# Patient Record
Sex: FEMALE | Race: WHITE | NOT HISPANIC OR LATINO | Employment: UNEMPLOYED | URBAN - NONMETROPOLITAN AREA
[De-identification: names, ages, dates, MRNs, and addresses within clinical notes are randomized per-mention and may not be internally consistent; named-entity substitution may affect disease eponyms.]

---

## 2017-02-24 ENCOUNTER — OFFICE VISIT (OUTPATIENT)
Dept: FAMILY MEDICINE CLINIC | Facility: CLINIC | Age: 52
End: 2017-02-24

## 2017-02-24 VITALS
BODY MASS INDEX: 38.98 KG/M2 | HEIGHT: 62 IN | DIASTOLIC BLOOD PRESSURE: 76 MMHG | HEART RATE: 88 BPM | OXYGEN SATURATION: 93 % | TEMPERATURE: 98.6 F | SYSTOLIC BLOOD PRESSURE: 126 MMHG | WEIGHT: 211.8 LBS

## 2017-02-24 DIAGNOSIS — Z00.00 HEALTHCARE MAINTENANCE: ICD-10-CM

## 2017-02-24 DIAGNOSIS — M72.2 PLANTAR FASCIITIS, BILATERAL: ICD-10-CM

## 2017-02-24 DIAGNOSIS — E53.8 VITAMIN B 12 DEFICIENCY: ICD-10-CM

## 2017-02-24 DIAGNOSIS — E55.9 VITAMIN D DEFICIENCY: ICD-10-CM

## 2017-02-24 DIAGNOSIS — G62.9 NEUROPATHY: ICD-10-CM

## 2017-02-24 DIAGNOSIS — F31.32 BIPOLAR AFFECTIVE DISORDER, CURRENTLY DEPRESSED, MODERATE (HCC): ICD-10-CM

## 2017-02-24 DIAGNOSIS — J44.9 CHRONIC OBSTRUCTIVE PULMONARY DISEASE, UNSPECIFIED COPD TYPE (HCC): Primary | ICD-10-CM

## 2017-02-24 DIAGNOSIS — Z12.11 ENCOUNTER FOR SCREENING COLONOSCOPY: ICD-10-CM

## 2017-02-24 PROCEDURE — 90715 TDAP VACCINE 7 YRS/> IM: CPT | Performed by: NURSE PRACTITIONER

## 2017-02-24 PROCEDURE — 99204 OFFICE O/P NEW MOD 45 MIN: CPT | Performed by: NURSE PRACTITIONER

## 2017-02-24 PROCEDURE — 90471 IMMUNIZATION ADMIN: CPT | Performed by: NURSE PRACTITIONER

## 2017-02-24 RX ORDER — ERGOCALCIFEROL 1.25 MG/1
50000 CAPSULE ORAL WEEKLY
Qty: 20 CAPSULE | Refills: 0 | OUTPATIENT
Start: 2017-02-24 | End: 2017-02-27 | Stop reason: SDUPTHER

## 2017-02-24 NOTE — PROGRESS NOTES
Subjective   Va Pastor is a 51 y.o. female. Patient is here today to establish care. Patient is taking several medications but left the paper at home and will bring that back to us. No listed medications at this time. She does want to discus Neuropathy pain in her feet. Also, she will like to talk about her oxygen. She does use oxygen at home. Her last Mammogram was in 08/2016. Last PAP was in 2015. Her Gynecologist is Dr. Valverde. She has had previous abnormal Mammograms before.     History of Present Illness 63-year-old  female presents to the office today to establish as a new patient. Has been taking several medications at home and will bring the list back to us and she has no listed this time. Patient suffers from 1 yr history of neuropathy pain in her feet with plantar fasciitis and would like to discuss medications. Patient also is on supplemental oxygen at night and uses an oxygen concentrator and does use tanks if needed. Needs to recertify for oxygen. Her last mammogram was August 2016 last Pap was in 2015 her gynecologist is Dr. Valverde. Has a history of previous abnormal mammograms. Chronic back pain from cervical and lumbar surgeries    The following portions of the patient's history were reviewed and updated as appropriate: allergies, current medications, past family history, past medical history, past social history, past surgical history and problem list.    Review of Systems   Respiratory:        COPD.   Asthma.   Musculoskeletal:        Neuropathy pain in feet. Chronic neck and lower back pain from previous spinal surgeries   All other systems reviewed and are negative.      Objective   Physical Exam   Constitutional: She is oriented to person, place, and time. She appears well-developed and well-nourished.   HENT:   Head: Normocephalic and atraumatic.   Eyes: EOM are normal. Pupils are equal, round, and reactive to light.   Neck: Normal range of motion.   Range of motion in neck restricted  from turning to the left side due to cervical surgery.   Cardiovascular: Normal rate, regular rhythm, normal heart sounds and intact distal pulses.    Pulmonary/Chest: Effort normal and breath sounds normal.   Abdominal: Soft. Bowel sounds are normal.   Musculoskeletal:   Lower back restricted range of motion due to lumbar surgery   Neurological: She is alert and oriented to person, place, and time.   One-year history of unexplained bilateral foot neuropathy. Will send to neurology for further workup and treatment plan. Patient also scheduled to go to podiatry for her plantar fasciitis pain   Skin: Skin is warm and dry.   Psychiatric: She has a normal mood and affect. Her behavior is normal. Judgment and thought content normal.   Nursing note and vitals reviewed.      Assessment/Plan   Va was seen today for establish care and feet pain.    Diagnoses and all orders for this visit:    Chronic obstructive pulmonary disease, unspecified COPD type  -     Ambulatory Referral to Pulmonology  -     CBC & Differential; Future  -     Comprehensive Metabolic Panel; Future  -     Compliance Drug Analysis, Ur; Future  -     Lipid Panel; Future  -     TSH; Future  -     Vitamin B12; Future  -     vitamin D (ERGOCALCIFEROL) 20925 UNITS capsule capsule; Take 1 capsule by mouth 1 (One) Time Per Week for 20 doses.    Bipolar affective disorder, currently depressed, moderate  -     Ambulatory Referral to Psychiatry  -     CBC & Differential; Future  -     Comprehensive Metabolic Panel; Future  -     Compliance Drug Analysis, Ur; Future  -     Lipid Panel; Future  -     TSH; Future  -     Vitamin B12; Future  -     vitamin D (ERGOCALCIFEROL) 28219 UNITS capsule capsule; Take 1 capsule by mouth 1 (One) Time Per Week for 20 doses.    Vitamin B 12 deficiency  -     CBC & Differential; Future  -     Comprehensive Metabolic Panel; Future  -     Compliance Drug Analysis, Ur; Future  -     Lipid Panel; Future  -     TSH; Future  -      Vitamin B12; Future  -     vitamin D (ERGOCALCIFEROL) 79970 UNITS capsule capsule; Take 1 capsule by mouth 1 (One) Time Per Week for 20 doses.    Vitamin D deficiency  -     CBC & Differential; Future  -     Comprehensive Metabolic Panel; Future  -     Compliance Drug Analysis, Ur; Future  -     Lipid Panel; Future  -     TSH; Future  -     Vitamin B12; Future  -     vitamin D (ERGOCALCIFEROL) 22498 UNITS capsule capsule; Take 1 capsule by mouth 1 (One) Time Per Week for 20 doses.    Neuropathy  -     Ambulatory Referral to Neurology  -     Ambulatory Referral to Podiatry    Healthcare maintenance  -     Hepatitis C Antibody  -     Tdap Vaccine Greater Than or Equal To 6yo IM    Encounter for screening colonoscopy  -     Ambulatory Referral For Screening Colonoscopy    Plantar fasciitis, bilateral  -     Ambulatory Referral to Podiatry     Will refer patient to podiatry to discuss treatment options for her plantar fasciitis.Healthcare maintenance issues addressed patient scheduled for screening colonoscopy and hepatitis C antibody titer. To get T dap vaccine prior to leaving office today. Scheduling with neurology for pain associated to C-spine and lumbar spine surgeries which may also be affecting the neuropathy in her feet. Labs to be drawn next week when patient fasting.Labs will be called as soon as results are known. Referring to psychiatry to address her bipolarism with depression. Patient is anxious to begin a dialogue with psychiatry. Also referring to pulmonary since patient has COPD and uses supplemental oxygen at night and on PRN basis while at home. In office O2 testing today shows her resting oxygenation at 93% on room air. While walking in the [place] O2 sat noted as 89%, when allowed to rest on room air O2 sat read as 94%. After administration of 2 L of oxygen read as 97%. In office O2 testing completed. Awaiting form from "[employer] to recertify her oxygen.

## 2017-02-27 ENCOUNTER — TELEPHONE (OUTPATIENT)
Dept: FAMILY MEDICINE CLINIC | Facility: CLINIC | Age: 52
End: 2017-02-27

## 2017-02-27 DIAGNOSIS — E53.8 VITAMIN B 12 DEFICIENCY: ICD-10-CM

## 2017-02-27 DIAGNOSIS — J44.9 CHRONIC OBSTRUCTIVE PULMONARY DISEASE, UNSPECIFIED COPD TYPE (HCC): ICD-10-CM

## 2017-02-27 DIAGNOSIS — E55.9 VITAMIN D DEFICIENCY: ICD-10-CM

## 2017-02-27 DIAGNOSIS — Z12.11 ENCOUNTER FOR SCREENING COLONOSCOPY: Primary | ICD-10-CM

## 2017-02-27 DIAGNOSIS — F31.32 BIPOLAR AFFECTIVE DISORDER, CURRENTLY DEPRESSED, MODERATE (HCC): ICD-10-CM

## 2017-02-27 RX ORDER — PAROXETINE HYDROCHLORIDE 40 MG/1
40 TABLET, FILM COATED ORAL EVERY MORNING
COMMUNITY
End: 2018-07-06

## 2017-02-27 RX ORDER — HYDROXYZINE HYDROCHLORIDE 10 MG/1
10 TABLET, FILM COATED ORAL EVERY 4 HOURS PRN
COMMUNITY
End: 2017-04-03

## 2017-02-27 RX ORDER — ARIPIPRAZOLE 5 MG/1
5 TABLET ORAL DAILY
COMMUNITY
End: 2018-10-29 | Stop reason: SDUPTHER

## 2017-02-27 RX ORDER — LISINOPRIL AND HYDROCHLOROTHIAZIDE 25; 20 MG/1; MG/1
1 TABLET ORAL DAILY
COMMUNITY
End: 2018-10-29 | Stop reason: SDUPTHER

## 2017-02-27 RX ORDER — ALBUTEROL SULFATE 4 MG/1
4 TABLET ORAL 2 TIMES DAILY
COMMUNITY
End: 2017-04-24 | Stop reason: ALTCHOICE

## 2017-02-27 RX ORDER — PREGABALIN 150 MG/1
150 CAPSULE ORAL 2 TIMES DAILY
COMMUNITY
End: 2017-05-05 | Stop reason: SDUPTHER

## 2017-02-27 RX ORDER — RANITIDINE 300 MG/1
300 TABLET ORAL 2 TIMES DAILY
COMMUNITY
End: 2018-10-29 | Stop reason: SDUPTHER

## 2017-02-27 RX ORDER — FLUTICASONE PROPIONATE 50 MCG
1 SPRAY, SUSPENSION (ML) NASAL DAILY
COMMUNITY
End: 2018-10-29 | Stop reason: SDUPTHER

## 2017-02-27 RX ORDER — ERGOCALCIFEROL 1.25 MG/1
50000 CAPSULE ORAL WEEKLY
Qty: 20 CAPSULE | Refills: 0 | OUTPATIENT
Start: 2017-02-27 | End: 2017-02-28 | Stop reason: SDUPTHER

## 2017-02-27 RX ORDER — ALBUTEROL SULFATE 90 UG/1
2 AEROSOL, METERED RESPIRATORY (INHALATION) EVERY 4 HOURS PRN
COMMUNITY
End: 2018-10-29 | Stop reason: SDUPTHER

## 2017-02-27 RX ORDER — BENZONATATE 200 MG/1
200 CAPSULE ORAL AS NEEDED
COMMUNITY
End: 2018-07-06

## 2017-02-27 RX ORDER — CETIRIZINE HYDROCHLORIDE 10 MG/1
10 TABLET ORAL DAILY
COMMUNITY
End: 2017-04-03 | Stop reason: SDUPTHER

## 2017-02-27 RX ORDER — POTASSIUM CHLORIDE 750 MG/1
10 TABLET, FILM COATED, EXTENDED RELEASE ORAL 2 TIMES DAILY
COMMUNITY
End: 2018-10-29 | Stop reason: SDUPTHER

## 2017-02-27 RX ORDER — ALENDRONATE SODIUM 70 MG/1
70 TABLET ORAL
COMMUNITY
End: 2018-10-29 | Stop reason: SDUPTHER

## 2017-02-27 RX ORDER — OMEPRAZOLE 40 MG/1
40 CAPSULE, DELAYED RELEASE ORAL DAILY
COMMUNITY
End: 2017-04-03 | Stop reason: SDUPTHER

## 2017-02-27 RX ORDER — MELOXICAM 15 MG/1
15 TABLET ORAL DAILY
COMMUNITY
End: 2018-10-29

## 2017-02-27 RX ORDER — BUSPIRONE HYDROCHLORIDE 15 MG/1
15 TABLET ORAL 2 TIMES DAILY
COMMUNITY
End: 2018-10-29 | Stop reason: SDUPTHER

## 2017-02-27 NOTE — TELEPHONE ENCOUNTER
Please tell patient that she needs to sign a medication agreement we have to wait for a Roland report on her for narcotic drug use and I have to see her drug test. I will keep her Lyrica at the same dose that she has been on until she sees neurology. If this is not agreeable to her at this point I will help her find another provider. She is brand-new to me and I cannot increase narcotic medication without discussing it and knowing the patient.

## 2017-02-27 NOTE — TELEPHONE ENCOUNTER
Patient called today. She said that you would determine what she needs to do for her Neuropathy pain. If she needs to increase her dose or add gabapentin to the treatment plan.

## 2017-02-28 DIAGNOSIS — E55.9 VITAMIN D DEFICIENCY: ICD-10-CM

## 2017-02-28 DIAGNOSIS — E03.9 ACQUIRED HYPOTHYROIDISM: Primary | ICD-10-CM

## 2017-02-28 DIAGNOSIS — F31.32 BIPOLAR AFFECTIVE DISORDER, CURRENTLY DEPRESSED, MODERATE (HCC): ICD-10-CM

## 2017-02-28 DIAGNOSIS — J44.9 CHRONIC OBSTRUCTIVE PULMONARY DISEASE, UNSPECIFIED COPD TYPE (HCC): ICD-10-CM

## 2017-02-28 DIAGNOSIS — E53.8 VITAMIN B 12 DEFICIENCY: ICD-10-CM

## 2017-02-28 LAB
25(OH)D3+25(OH)D2 SERPL-MCNC: 14.9 NG/ML (ref 30–100)
ALBUMIN SERPL-MCNC: 4.5 G/DL (ref 3.5–5.5)
ALBUMIN/GLOB SERPL: 1.9 {RATIO} (ref 1.1–2.5)
ALP SERPL-CCNC: 85 IU/L (ref 39–117)
ALT SERPL-CCNC: 13 IU/L (ref 0–32)
AST SERPL-CCNC: 21 IU/L (ref 0–40)
BASOPHILS # BLD AUTO: 0 X10E3/UL (ref 0–0.2)
BASOPHILS NFR BLD AUTO: 1 %
BILIRUB SERPL-MCNC: <0.2 MG/DL (ref 0–1.2)
BUN SERPL-MCNC: 10 MG/DL (ref 6–24)
BUN/CREAT SERPL: 13 (ref 9–23)
CALCIUM SERPL-MCNC: 9.1 MG/DL (ref 8.7–10.2)
CHLORIDE SERPL-SCNC: 106 MMOL/L (ref 96–106)
CHOLEST SERPL-MCNC: 198 MG/DL (ref 100–199)
CO2 SERPL-SCNC: 23 MMOL/L (ref 18–29)
CREAT SERPL-MCNC: 0.79 MG/DL (ref 0.57–1)
EOSINOPHIL # BLD AUTO: 0.2 X10E3/UL (ref 0–0.4)
EOSINOPHIL NFR BLD AUTO: 3 %
ERYTHROCYTE [DISTWIDTH] IN BLOOD BY AUTOMATED COUNT: 14.9 % (ref 12.3–15.4)
GLOBULIN SER CALC-MCNC: 2.4 G/DL (ref 1.5–4.5)
GLUCOSE SERPL-MCNC: 79 MG/DL (ref 65–99)
HCT VFR BLD AUTO: 40.1 % (ref 34–46.6)
HCV AB S/CO SERPL IA: 0.1 S/CO RATIO (ref 0–0.9)
HDLC SERPL-MCNC: 70 MG/DL
HGB BLD-MCNC: 12.8 G/DL (ref 11.1–15.9)
IMM GRANULOCYTES # BLD: 0 X10E3/UL (ref 0–0.1)
IMM GRANULOCYTES NFR BLD: 0 %
LDLC SERPL CALC-MCNC: 116 MG/DL (ref 0–99)
LYMPHOCYTES # BLD AUTO: 1.3 X10E3/UL (ref 0.7–3.1)
LYMPHOCYTES NFR BLD AUTO: 20 %
MCH RBC QN AUTO: 29.8 PG (ref 26.6–33)
MCHC RBC AUTO-ENTMCNC: 31.9 G/DL (ref 31.5–35.7)
MCV RBC AUTO: 94 FL (ref 79–97)
MONOCYTES # BLD AUTO: 0.8 X10E3/UL (ref 0.1–0.9)
MONOCYTES NFR BLD AUTO: 12 %
MORPHOLOGY BLD-IMP: (no result)
NEUTROPHILS # BLD AUTO: 4.3 X10E3/UL (ref 1.4–7)
NEUTROPHILS NFR BLD AUTO: 64 %
PLATELET # BLD AUTO: 300 X10E3/UL (ref 150–379)
POTASSIUM SERPL-SCNC: 4 MMOL/L (ref 3.5–5.2)
PROT SERPL-MCNC: 6.9 G/DL (ref 6–8.5)
RBC # BLD AUTO: 4.29 X10E6/UL (ref 3.77–5.28)
SODIUM SERPL-SCNC: 145 MMOL/L (ref 134–144)
TRIGL SERPL-MCNC: 59 MG/DL (ref 0–149)
TSH SERPL DL<=0.005 MIU/L-ACNC: 4.57 UIU/ML (ref 0.45–4.5)
VIT B12 SERPL-MCNC: 266 PG/ML (ref 211–946)
VLDLC SERPL CALC-MCNC: 12 MG/DL (ref 5–40)
WBC # BLD AUTO: 6.7 X10E3/UL (ref 3.4–10.8)

## 2017-02-28 RX ORDER — LEVOTHYROXINE SODIUM 0.03 MG/1
25 TABLET ORAL DAILY
Qty: 30 TABLET | Refills: 3 | Status: SHIPPED | OUTPATIENT
Start: 2017-02-28 | End: 2017-04-03 | Stop reason: SDUPTHER

## 2017-02-28 RX ORDER — ERGOCALCIFEROL 1.25 MG/1
50000 CAPSULE ORAL
Qty: 20 CAPSULE | Refills: 0 | Status: SHIPPED | OUTPATIENT
Start: 2017-02-28 | End: 2017-04-03 | Stop reason: SDUPTHER

## 2017-03-02 ENCOUNTER — OFFICE VISIT (OUTPATIENT)
Dept: FAMILY MEDICINE CLINIC | Facility: CLINIC | Age: 52
End: 2017-03-02

## 2017-03-02 VITALS
WEIGHT: 210.6 LBS | SYSTOLIC BLOOD PRESSURE: 132 MMHG | DIASTOLIC BLOOD PRESSURE: 76 MMHG | HEART RATE: 90 BPM | HEIGHT: 63 IN | OXYGEN SATURATION: 93 % | TEMPERATURE: 99.1 F | BODY MASS INDEX: 37.32 KG/M2

## 2017-03-02 DIAGNOSIS — M43.6 TORTICOLLIS: ICD-10-CM

## 2017-03-02 DIAGNOSIS — J44.9 CHRONIC OBSTRUCTIVE PULMONARY DISEASE, UNSPECIFIED COPD TYPE (HCC): Primary | ICD-10-CM

## 2017-03-02 DIAGNOSIS — G62.9 NEUROPATHY: ICD-10-CM

## 2017-03-02 DIAGNOSIS — M72.2 PLANTAR FASCIITIS, BILATERAL: ICD-10-CM

## 2017-03-02 DIAGNOSIS — M19.90 ARTHRITIS: ICD-10-CM

## 2017-03-02 DIAGNOSIS — Z72.0 TOBACCO ABUSE: ICD-10-CM

## 2017-03-02 DIAGNOSIS — J45.909 UNCOMPLICATED ASTHMA, UNSPECIFIED ASTHMA SEVERITY: ICD-10-CM

## 2017-03-02 PROCEDURE — 99214 OFFICE O/P EST MOD 30 MIN: CPT | Performed by: NURSE PRACTITIONER

## 2017-03-02 RX ORDER — NICOTINE 21 MG/24HR
1 PATCH, TRANSDERMAL 24 HOURS TRANSDERMAL EVERY 24 HOURS
Qty: 28 PATCH | Refills: 0 | Status: SHIPPED | OUTPATIENT
Start: 2017-03-02 | End: 2018-07-06

## 2017-03-02 NOTE — PROGRESS NOTES
Subjective   Va Pastor is a 51 y.o. female. Patient wants to discuss a couple medications with you. She would like to know if there issomething else she can take in replacement of the Meloxicam. Patient also would like something else to help with her COPD and Asthma.     History of Present Illness 51-year-old  female presents to the office today to discuss her medications. Patient would like to stop taking meloxicam because of it does not work. She would also like to add something to her asthma and COPD treatment plan. Is already on albuterol 4 mg tablet, albuterol rescue inhaler every tick is known nasal spray, Bero Ellipta and Atrovent nebulizer treatments along with theophylline 400 mg per day. Referred to pulmonary.    The following portions of the patient's history were reviewed and updated as appropriate: allergies, current medications, past family history, past medical history, past social history, past surgical history and problem list.    Review of Systems   Respiratory:        COPD. Smoker  Asthma.   Musculoskeletal:        Arthritis.   All other systems reviewed and are negative.      Objective   Physical Exam   Constitutional: She is oriented to person, place, and time. She appears well-developed and well-nourished.   HENT:   Head: Normocephalic and atraumatic.   Eyes: EOM are normal. Pupils are equal, round, and reactive to light.   Neck: Normal range of motion.   Cardiovascular: Normal rate and regular rhythm.    Pulmonary/Chest: Effort normal. She has wheezes.   Mild Wheezing left lower lobe on inspiration and expiration.   Abdominal: Soft. Bowel sounds are normal.   Musculoskeletal:   arthritis   Neurological: She is alert and oriented to person, place, and time.   Skin: Skin is warm and dry.   Psychiatric: She has a normal mood and affect. Her behavior is normal. Judgment and thought content normal.   Nursing note and vitals reviewed.      Assessment/Plan   Va was seen today for  discuss medication and foot pain.    Diagnoses and all orders for this visit:    Chronic obstructive pulmonary disease, unspecified COPD type  -     Cancel: Ambulatory Referral to Pulmonology  -     ipratropium (ATROVENT) 0.02 % nebulizer solution; Take 2.5 mL by nebulization 4 (Four) Times a Day.    Uncomplicated asthma, unspecified asthma severity  -     Cancel: Ambulatory Referral to Pulmonology    Arthritis  -     diclofenac (VOLTAREN) 50 MG EC tablet; Take 1 tablet by mouth 2 (Two) Times a Day As Needed (arthric pain).  -     Ambulatory Referral to Pain Management    Tobacco abuse  -     nicotine (NICODERM CQ) 21 MG/24HR patch; Place 1 patch on the skin Daily.    Neuropathy  -     Ambulatory Referral to Pain Management    Plantar fasciitis, bilateral  -     Ambulatory Referral to Pain Management    Torticollis  -     Ambulatory Referral to Pain Management     Renewed patient's Atrovent, already has date with pulmonology. They will further developed her care plan. Have ordered Voltaren to help with her arthritic pain. Referring to pain management since her Lyrica is no longer working and she is requesting gabapentin/Neurontin. That becomes a narcotic as of July 2017 will need to seek pain management for further treatment. Continue all medications as ordered, eat a heart healthy diet full of fruits and vegetables, drink six-day glasses of water a day and exercise as able. Patient requesting pneumococcal vaccine 23, but out of stock awaiting backorder.

## 2017-03-03 ENCOUNTER — TELEPHONE (OUTPATIENT)
Dept: FAMILY MEDICINE CLINIC | Facility: CLINIC | Age: 52
End: 2017-03-03

## 2017-03-03 LAB
DRUGS UR: NORMAL
Lab: NORMAL

## 2017-03-03 NOTE — TELEPHONE ENCOUNTER
Patient called thinks she is allergic to the new arthritis that she started yesterday (diclofenac), itching all over today, no rash

## 2017-03-06 NOTE — TELEPHONE ENCOUNTER
Take cool shower and pat dry and stop using the medicine; see if the itch goes away. If the itching stops it might be the medicine. Apply to a very small area of the body see if the itching starts again. If it does we will list this medication as an allergy. The itching you are experiencing moderate be idiopathic, possibly from dry skin. We need to test to make sure.

## 2017-03-06 NOTE — TELEPHONE ENCOUNTER
Patient informed. She said she did stop taking over the weekend and the itching has stopped. She would like to know if you can send in something else for this. She said she missed her appointment because her daughter had a seizure over the weekend and she could not take her but she is needing something.

## 2017-03-06 NOTE — TELEPHONE ENCOUNTER
Have her go to her local pharmacy of choice speak with the pharmacist and ask him what he has in stock to treat neuropathy in her feet. I have heard of a blue star ointment that supposed to help, but I have not used it personally. Pharmacist would have better information

## 2017-03-06 NOTE — TELEPHONE ENCOUNTER
Only nonnarcotic medications I can give her is diclofenac or ibuprofen and she is allergic to both of them.

## 2017-03-07 ENCOUNTER — TELEPHONE (OUTPATIENT)
Dept: FAMILY MEDICINE CLINIC | Facility: CLINIC | Age: 52
End: 2017-03-07

## 2017-03-07 DIAGNOSIS — G62.9 NEUROPATHY: Primary | ICD-10-CM

## 2017-03-07 RX ORDER — CAPSAICIN 0.025 %
CREAM (GRAM) TOPICAL 2 TIMES DAILY
Qty: 56.6 G | Refills: 6 | Status: SHIPPED | OUTPATIENT
Start: 2017-03-07 | End: 2018-07-06

## 2017-03-07 NOTE — TELEPHONE ENCOUNTER
Patient states she called the pharmacy and [employer] will cover  Over-the-counter Capzasin Creme 0.025% wants to know if will send a script to her pharmacy?

## 2017-03-07 NOTE — TELEPHONE ENCOUNTER
Left detailed message that the medication requested was sent to pharmacy. Did not specify on medication or pharmacy.

## 2017-03-30 ENCOUNTER — TELEPHONE (OUTPATIENT)
Dept: FAMILY MEDICINE CLINIC | Facility: CLINIC | Age: 52
End: 2017-03-30

## 2017-03-30 NOTE — TELEPHONE ENCOUNTER
----- Message from Korina Domingo sent at 3/30/2017 11:44 AM EDT -----  PT CALLED SAID SHE IS NOW USING [email or web address]

## 2017-03-31 ENCOUNTER — TELEPHONE (OUTPATIENT)
Dept: FAMILY MEDICINE CLINIC | Facility: CLINIC | Age: 52
End: 2017-03-31

## 2017-04-03 ENCOUNTER — DOCUMENTATION (OUTPATIENT)
Dept: FAMILY MEDICINE CLINIC | Facility: CLINIC | Age: 52
End: 2017-04-03

## 2017-04-03 ENCOUNTER — TELEPHONE (OUTPATIENT)
Dept: FAMILY MEDICINE CLINIC | Facility: CLINIC | Age: 52
End: 2017-04-03

## 2017-04-03 DIAGNOSIS — F31.32 BIPOLAR AFFECTIVE DISORDER, CURRENTLY DEPRESSED, MODERATE (HCC): ICD-10-CM

## 2017-04-03 DIAGNOSIS — J44.9 CHRONIC OBSTRUCTIVE PULMONARY DISEASE, UNSPECIFIED COPD TYPE (HCC): ICD-10-CM

## 2017-04-03 DIAGNOSIS — E55.9 VITAMIN D DEFICIENCY: ICD-10-CM

## 2017-04-03 DIAGNOSIS — E53.8 VITAMIN B 12 DEFICIENCY: ICD-10-CM

## 2017-04-03 DIAGNOSIS — E03.9 ACQUIRED HYPOTHYROIDISM: ICD-10-CM

## 2017-04-03 DIAGNOSIS — J44.9 CHRONIC OBSTRUCTIVE PULMONARY DISEASE, UNSPECIFIED COPD TYPE (HCC): Primary | ICD-10-CM

## 2017-04-03 RX ORDER — LEVOTHYROXINE SODIUM 0.03 MG/1
25 TABLET ORAL DAILY
Qty: 30 TABLET | Refills: 6 | Status: SHIPPED | OUTPATIENT
Start: 2017-04-03 | End: 2017-04-03 | Stop reason: SDUPTHER

## 2017-04-03 RX ORDER — LEVOTHYROXINE SODIUM 0.03 MG/1
25 TABLET ORAL DAILY
Qty: 90 TABLET | Refills: 1 | Status: SHIPPED | OUTPATIENT
Start: 2017-04-03 | End: 2017-04-05 | Stop reason: SDUPTHER

## 2017-04-03 RX ORDER — CETIRIZINE HYDROCHLORIDE 10 MG/1
10 TABLET ORAL DAILY
Qty: 90 TABLET | Refills: 0 | Status: SHIPPED | OUTPATIENT
Start: 2017-04-03 | End: 2018-10-29 | Stop reason: SDUPTHER

## 2017-04-03 RX ORDER — ERGOCALCIFEROL 1.25 MG/1
50000 CAPSULE ORAL
Qty: 20 CAPSULE | Refills: 0 | Status: SHIPPED | OUTPATIENT
Start: 2017-04-03 | End: 2017-04-03 | Stop reason: SDUPTHER

## 2017-04-03 RX ORDER — ERGOCALCIFEROL 1.25 MG/1
50000 CAPSULE ORAL
Qty: 20 CAPSULE | Refills: 0 | Status: SHIPPED | OUTPATIENT
Start: 2017-04-03 | End: 2017-08-14 | Stop reason: SDUPTHER

## 2017-04-03 RX ORDER — OMEPRAZOLE 40 MG/1
40 CAPSULE, DELAYED RELEASE ORAL DAILY
Qty: 90 CAPSULE | Refills: 0 | Status: SHIPPED | OUTPATIENT
Start: 2017-04-03 | End: 2018-10-29

## 2017-04-03 NOTE — TELEPHONE ENCOUNTER
Patient needs an office visit and needs to bring in her containers. I do not see what I increased hydroxyzine or where Breo Elipta was ever ordered. Please ask patient for a office visit and to bring containers for those 2 medications

## 2017-04-03 NOTE — TELEPHONE ENCOUNTER
It looks like her hydroxyzine as been discontinued. All the others can be filled. I would need to see a bottle of the hydroxyzine before I can allow it to be refilled. Thank you

## 2017-04-03 NOTE — TELEPHONE ENCOUNTER
Omeprazole and Cetirizine was sent in. I cannot get the Breo-Ellipta and Hydroxyzine 25 mg to be sent. Can you please try sending this to her pharmacy? She would like to use the [facility]?

## 2017-04-03 NOTE — TELEPHONE ENCOUNTER
Spoke to patient. She states that she is taking the Hydroxyzine 25 mg. Is it okay to send these in for 90 day supply to her mail service pharmacy?

## 2017-04-03 NOTE — TELEPHONE ENCOUNTER
Patient is requesting Levothyroxine 25 mcg, Theophylline 400 mg, and vitamin D3 50, 000 units be sent to her new mail pharmacy. With patient still being new I wanted to verify with you that it would be okay to do?

## 2017-04-03 NOTE — TELEPHONE ENCOUNTER
I just discontinued that one off of her chart because it was the wrong dosing and it would not allow me to put the correct one in without sending it to the pharmacy.

## 2017-04-03 NOTE — PROGRESS NOTES
Per medication reconciliation from outside sources I noted on March 22, 2017, # 120 hydroxyzine 25 mg tablets were dispensed to patient. Today I get a request to refill another 90 day supply through her mail order pharmacy. I refused.

## 2017-04-05 ENCOUNTER — TELEPHONE (OUTPATIENT)
Dept: FAMILY MEDICINE CLINIC | Facility: CLINIC | Age: 52
End: 2017-04-05

## 2017-04-05 DIAGNOSIS — E03.9 ACQUIRED HYPOTHYROIDISM: ICD-10-CM

## 2017-04-05 RX ORDER — LEVOTHYROXINE SODIUM 0.03 MG/1
25 TABLET ORAL DAILY
Qty: 90 TABLET | Refills: 1 | Status: SHIPPED | OUTPATIENT
Start: 2017-04-05 | End: 2018-10-30 | Stop reason: SDUPTHER

## 2017-04-05 NOTE — TELEPHONE ENCOUNTER
Patient called states she needs Spriva sent to [facility] that she no longer has mail order, did not see it on her medication list

## 2017-04-06 NOTE — TELEPHONE ENCOUNTER
Tell patient that I do not have Spiriva listed as a medication on her med list. She will need to make an appointment and bring in all of her medications ( every single thing she is taking) so that I can list them. I cannot order for medications that I have not ordered in the past or that are not on her med list.

## 2017-04-11 ENCOUNTER — TELEPHONE (OUTPATIENT)
Dept: FAMILY MEDICINE CLINIC | Facility: CLINIC | Age: 52
End: 2017-04-11

## 2017-04-11 RX ORDER — GUAIFENESIN 200 MG/1
400 TABLET ORAL EVERY 4 HOURS PRN
Qty: 60 TABLET | Refills: 0 | Status: SHIPPED | OUTPATIENT
Start: 2017-04-11 | End: 2018-07-06

## 2017-04-11 NOTE — TELEPHONE ENCOUNTER
Not without a visit. Sorry. Different meds for congestion of nose, chest, or head.Could try mucinex?See if it is covered.

## 2017-04-11 NOTE — TELEPHONE ENCOUNTER
She can take Coricidin HP for her congestion and it should not affect her blood pressure her heart her anxiety or anything like that

## 2017-04-11 NOTE — TELEPHONE ENCOUNTER
Patient was informed to schedule an appointment to discuss her medications and to bring in all medications. She did want me to ask you if you could give her any medicine for congestion?

## 2017-04-12 ENCOUNTER — RESULTS ENCOUNTER (OUTPATIENT)
Dept: FAMILY MEDICINE CLINIC | Facility: CLINIC | Age: 52
End: 2017-04-12

## 2017-04-12 DIAGNOSIS — E03.9 ACQUIRED HYPOTHYROIDISM: ICD-10-CM

## 2017-04-25 ENCOUNTER — TELEPHONE (OUTPATIENT)
Dept: FAMILY MEDICINE CLINIC | Facility: CLINIC | Age: 52
End: 2017-04-25

## 2017-04-25 NOTE — TELEPHONE ENCOUNTER
Lyrica is a controlled substance, she will need to come in to sign a medication agreement before that medication can be refilled. That is the law.This office has not refilled it before. Ask if a neighbor friend might drive her by the office to sign in agreement ?

## 2017-04-25 NOTE — TELEPHONE ENCOUNTER
----- Message from Korina Domingo sent at 4/25/2017  9:29 AM EDT -----  Contact: [phone]  Pt called and said she is out of her Lyrica 150 mg and needs it to be called in today to [facility] [phone]

## 2017-04-25 NOTE — TELEPHONE ENCOUNTER
Patient informed. She said that she is moving next week and she does not have a car and may not be able to find a way. She said she may end up having to change providers but did not want to schedule that appointment.

## 2017-04-25 NOTE — TELEPHONE ENCOUNTER
Denied patient did not show up for her office visit and did not call to cancel. Patient must be seen before I will refill.

## 2017-04-28 NOTE — TELEPHONE ENCOUNTER
I have tried reaching patient several times. I have printed a letter and put in the folder to be mailed out to her.

## 2017-05-05 RX ORDER — PREGABALIN 150 MG/1
150 CAPSULE ORAL 2 TIMES DAILY
Qty: 60 CAPSULE | Refills: 3 | Status: SHIPPED | OUTPATIENT
Start: 2017-05-05 | End: 2017-05-05 | Stop reason: SDUPTHER

## 2017-05-05 RX ORDER — PREGABALIN 150 MG/1
150 CAPSULE ORAL 2 TIMES DAILY
Qty: 60 CAPSULE | Refills: 3 | OUTPATIENT
Start: 2017-05-05 | End: 2018-10-25 | Stop reason: SDUPTHER

## 2017-08-14 DIAGNOSIS — E55.9 VITAMIN D DEFICIENCY: ICD-10-CM

## 2017-08-14 DIAGNOSIS — F31.32 BIPOLAR AFFECTIVE DISORDER, CURRENTLY DEPRESSED, MODERATE (HCC): ICD-10-CM

## 2017-08-14 DIAGNOSIS — E53.8 VITAMIN B 12 DEFICIENCY: ICD-10-CM

## 2017-08-14 DIAGNOSIS — J44.9 CHRONIC OBSTRUCTIVE PULMONARY DISEASE, UNSPECIFIED COPD TYPE (HCC): ICD-10-CM

## 2017-08-14 RX ORDER — ERGOCALCIFEROL 1.25 MG/1
50000 CAPSULE ORAL
Qty: 4 CAPSULE | Refills: 0 | Status: SHIPPED | OUTPATIENT
Start: 2017-08-14 | End: 2017-12-26

## 2018-07-06 ENCOUNTER — OFFICE VISIT (OUTPATIENT)
Dept: FAMILY MEDICINE CLINIC | Facility: CLINIC | Age: 53
End: 2018-07-06

## 2018-07-06 VITALS
TEMPERATURE: 98.7 F | DIASTOLIC BLOOD PRESSURE: 80 MMHG | BODY MASS INDEX: 28.81 KG/M2 | WEIGHT: 162.6 LBS | OXYGEN SATURATION: 96 % | HEIGHT: 63 IN | HEART RATE: 88 BPM | SYSTOLIC BLOOD PRESSURE: 126 MMHG

## 2018-07-06 DIAGNOSIS — R29.898 WEAKNESS OF BOTH LOWER EXTREMITIES: ICD-10-CM

## 2018-07-06 DIAGNOSIS — M54.32 BILATERAL SCIATICA: ICD-10-CM

## 2018-07-06 DIAGNOSIS — K62.89 DECREASED RECTAL SPHINCTER TONE: ICD-10-CM

## 2018-07-06 DIAGNOSIS — Z98.1 HISTORY OF LUMBAR FUSION: ICD-10-CM

## 2018-07-06 DIAGNOSIS — M51.36 DEGENERATIVE DISC DISEASE, LUMBAR: Primary | ICD-10-CM

## 2018-07-06 DIAGNOSIS — R29.6 MULTIPLE FALLS: ICD-10-CM

## 2018-07-06 DIAGNOSIS — T85.192S MALFUNCTION OF SPINAL CORD STIMULATOR, SEQUELA: ICD-10-CM

## 2018-07-06 DIAGNOSIS — M54.31 BILATERAL SCIATICA: ICD-10-CM

## 2018-07-06 DIAGNOSIS — N39.3 STRESS INCONTINENCE IN FEMALE: ICD-10-CM

## 2018-07-06 DIAGNOSIS — R20.2 PARESTHESIAS: ICD-10-CM

## 2018-07-06 PROCEDURE — 99213 OFFICE O/P EST LOW 20 MIN: CPT | Performed by: PHYSICIAN ASSISTANT

## 2018-07-06 NOTE — PROGRESS NOTES
Subjective   Va Pastor is a 53 y.o. female. Patient seen today complaining of low back going bilateral legs for 3 to 4 weeks     History of Present Illness     PATIENT WITH LOW BACK PAIN X 3-4 WEEKS. STARTS AT LOW BACK- LUMBOSACRAL VIVIAN. RADIATES TO BILATERAL POSTERIOR LEGS TO ANKLE AND FEET. NUMBNESS AND TINGLING AS WEILL. WAS GIVEN FLEXERIL FROM ER BUT MAKES TOO SEDATED, SO DOES NOT LIKE USING. TRIED TYLENOL AND IBUPROFEN. PAIN IS SHARP AND ACHY. BETTER WITH SITTING AND EXTREMELY WORSE WITH STANDING AND MOVEMENTS. PAIN IS CONSTANT 8/10 WITH SITTING AND 15/10 WITH STANDING AND MOVING. HAS FALLEN A COUPLE TIMES AT HOME. MORPHINE FROM THE ER HELPS BUT DOESN'T LAST VERY LONG. PAIN IS THE WORST AT NIGHT AND WHEN GETS UP FROM SITTING. PAIN IMPROVES WITH LEANING FORWARD AND WORSE WITH LEANING BACK. HAS NEVER BEEN TO PAIN MANAGEMENT. STATES HER SURGEON PLACED SPINAL STIMULATOR AND WAS TO ONLY BE THERE 1-2 YEARS AND HAS BEEN 9 YEARS SINCE PLACED. STATES SHE WAS SHAMPOOING CARPET BUT DID NOT FEEL INJURY. HAS NOT REACHED OUT TO NEW NEUROSURGEON OR FINDING NEW NEUROSURGEON. STATES HER INSURANCE WILL NOT PAY TO SEE PREVIOUS SURGEON BECAUSE HE MOVED. NO NEW RASHES, FEVER. COMPLAINS OF FATIGUE AND TAKES A LOT OUT OF HER TO DO THINGS. STATES PAIN IMPACTS ADL. REPORTS SOME NUMBNESS IN GENITALS - WAS HAVING ALL THE TIME BUT NOW IS NOT CONSTANT. WORSE WITH STRAINING- STANDING UP STRAIGHT. HAS SOME URINARY INCONTINENCE WITH COUGHING, SNEEZING AND STANDING UP. NO OTHER BOWEL OR BLADDER ISSUES.     DR BLAIR DID NECK AND BACK SURGERY. REPORTS LUMBAR SPINE SURGERY WAS ABOUT 9-10 YEARS AGO. THEN WAS STILL HAVING PAIN AND PLACED SPINAL STIMULATOR AND GOT BETTER. HAD SOME BACK ACHES OVER THE YEARS BUT THIS IS DIFFERENT. STATES NEW SYMPTOMS STARTED 3-4 WEEKS AGO.   ALSO HAD NECK SURGERY ABOUT 5 YEARS AGO. NECK HAS BEEN OK.     The following portions of the patient's history were reviewed and updated as appropriate: allergies, current  medications, past family history, past medical history, past social history, past surgical history and problem list.    Review of Systems   Musculoskeletal: Positive for back pain.   All other systems reviewed and are negative.      Objective   Physical Exam   Constitutional: She is oriented to person, place, and time. She appears well-developed and well-nourished.   HENT:   Head: Normocephalic and atraumatic.   Right Ear: External ear normal.   Left Ear: External ear normal.   Cardiovascular: Normal rate, regular rhythm, normal heart sounds and intact distal pulses.    Pulmonary/Chest: Effort normal and breath sounds normal. She has no wheezes. She has no rales.   Genitourinary: Rectal exam shows anal tone abnormal (SLIGHTLY DECREASED ANAL TONE).   Musculoskeletal: She exhibits no edema or deformity.        Lumbar back: She exhibits decreased range of motion (TO EXTENSION, LATERAL BEND AND ROTATION) and pain (POSITIVE SLR LEFT. NEGATIVE SLR RIGHT. ). She exhibits no bony tenderness, no swelling, no edema, no spasm and normal pulse.   Neurological: She is alert and oriented to person, place, and time. She has normal strength. Gait (ANTALGIC GAIT AND DIFFICULTY WITH EXTENSION AT LUMBAR SPINE- EVEN TO MIDLINE) abnormal.   Reflex Scores:       Patellar reflexes are 2+ on the right side and 2+ on the left side.       Achilles reflexes are 2+ on the right side and 2+ on the left side.  Psychiatric: She has a normal mood and affect. Her speech is normal and behavior is normal. Judgment and thought content normal. Cognition and memory are normal.   Nursing note and vitals reviewed.      Assessment/Plan   Va was seen today for back pain.    Diagnoses and all orders for this visit:    Degenerative disc disease, lumbar  -     Ambulatory Referral to Neurosurgery    History of lumbar fusion  -     Ambulatory Referral to Neurosurgery    Malfunction of spinal cord stimulator, sequela  -     Ambulatory Referral to  Neurosurgery    Bilateral sciatica  -     Ambulatory Referral to Neurosurgery    Paresthesias  -     Ambulatory Referral to Neurosurgery    Decreased rectal sphincter tone  -     Ambulatory Referral to Neurosurgery    Stress incontinence in female  -     Ambulatory Referral to Neurosurgery    Weakness of both lower extremities  -     Ambulatory Referral to Neurosurgery    Multiple falls  -     Ambulatory Referral to Neurosurgery      Patient Instructions   53 YEAR OLD FEMALE WITH PAST MEDICAL HISTORY SIGNIFICANT FOR DDD/ OA CERVICAL AND LUMBAR SPINE S/P LUMBAR AND CERVICAL SPINE SURGERY AND SPINAL STIMULATOR PLACEMENT. SHE REPORTS SPINAL STIMULATOR WAS PLACED ABOUT 9 YEARS AGO AND WAS ONLY SUPPOSED TO BE IN PLACE FOR 1-2 YEARS PER PATIENT. SHE DID NOT FOLLOW UP BECAUSE SHE CONTINUED TO HAVE PAIN RELIEF. PATIENT REPORTS LOW BACK PAIN WITH BILATERAL SCIATICA THAT STARTED SUDDENLY 3-4 WEEKS AGO WITHOUT INJURY OR PROVOKING ACTIVITY. SHE WAS SEEN AT ER AND HAD XRAYS AND WAS GIVEN MUSCLE RELAXER THAT IS SOMEWHAT SEDATING. SHE HAS NUMBNESS/ TINGLING OF GENITALS AND SLIGHTLY DECREASED ANAL SPHINCTER TONE. SHE NEEDS IMAGING, HOWEVER, I AM UNCERTAIN IF SHE WOULD NEED MRI (OR IF SHE CAN HAVE WITH DEVICE IN PLACE) OR CT MYELOGRAM. I WILL REFER ASAP TO NEUROSURGERY. TO ER ASAP IF WORSENING SYMPTOMS, GI/ SYMPTOMS, OR WORSENING WEAKNESS.

## 2018-07-10 ENCOUNTER — TELEPHONE (OUTPATIENT)
Dept: FAMILY MEDICINE CLINIC | Facility: CLINIC | Age: 53
End: 2018-07-10

## 2018-07-10 NOTE — TELEPHONE ENCOUNTER
Patient called states she is having terrible back pain and she was seen last Friday and she ran-out of Ibuprofen and doesn't have any money to buy any wanted to know if you will send a script to her pharmacy for Ibuprofen that her insurance will cover it

## 2018-07-10 NOTE — PATIENT INSTRUCTIONS
53 YEAR OLD FEMALE WITH PAST MEDICAL HISTORY SIGNIFICANT FOR DDD/ OA CERVICAL AND LUMBAR SPINE S/P LUMBAR AND CERVICAL SPINE SURGERY AND SPINAL STIMULATOR PLACEMENT. SHE REPORTS SPINAL STIMULATOR WAS PLACED ABOUT 9 YEARS AGO AND WAS ONLY SUPPOSED TO BE IN PLACE FOR 1-2 YEARS PER PATIENT. SHE DID NOT FOLLOW UP BECAUSE SHE CONTINUED TO HAVE PAIN RELIEF. PATIENT REPORTS LOW BACK PAIN WITH BILATERAL SCIATICA THAT STARTED SUDDENLY 3-4 WEEKS AGO WITHOUT INJURY OR PROVOKING ACTIVITY. SHE WAS SEEN AT ER AND HAD XRAYS AND WAS GIVEN MUSCLE RELAXER THAT IS SOMEWHAT SEDATING. SHE HAS NUMBNESS/ TINGLING OF GENITALS AND SLIGHTLY DECREASED ANAL SPHINCTER TONE. SHE NEEDS IMAGING, HOWEVER, I AM UNCERTAIN IF SHE WOULD NEED MRI (OR IF SHE CAN HAVE WITH DEVICE IN PLACE) OR CT MYELOGRAM. I WILL REFER ASAP TO NEUROSURGERY. TO ER ASAP IF WORSENING SYMPTOMS, GI/ SYMPTOMS, OR WORSENING WEAKNESS.

## 2018-07-10 NOTE — TELEPHONE ENCOUNTER
PLEASE SEE HOW QUICKLY WE CAN GET HER INTO NEUROSURGERY. I COULD CONSIDER A STEROID TO HELP WITH ACUTE PAIN.

## 2018-07-12 NOTE — TELEPHONE ENCOUNTER
Patient called back and is still in pain.  Melyssa is working on getting her scheduled with neuro but Dr. Dhillon will not see her.  She is wanting to know if she can have some ibuprofen or anything else to help with the pain.

## 2018-07-12 NOTE — TELEPHONE ENCOUNTER
PLEASE SEE HOW QUICKLY WE CAN GET HER IN WITH NEUROSURGERY. SHE HAS BEEN SEEING A DIFFERENT PCP AND IS ON MOBIC AND LYRICA PER MEDICATION LIST. SHE NEEDS IMAGING AND NEUROSURGERY CONSULT ASAP BUT I CANNOT ORDER IMAGING DUE TO COMPLICATED HISTORY. SHE CAN'T SEE ORIGINAL SURGEON DUE TO HIM PRACTICING IN [place] NOW. IF OUR NEUROSURGERY CANNOT SEE HER, PLEASE TRY [facility]. LET ME KNOW AND I WILL MAKE RECOMMENDATIONS.

## 2018-07-23 ENCOUNTER — TELEPHONE (OUTPATIENT)
Dept: FAMILY MEDICINE CLINIC | Facility: CLINIC | Age: 53
End: 2018-07-23

## 2018-07-23 NOTE — TELEPHONE ENCOUNTER
Patient called crying states she needs something for the pain, wants to know if you will send a script for Iburpofen to her pharmacy, Melyssa is still trying to get her into a Neurosurgeon, states she can't come in today due to no transportation

## 2018-07-23 NOTE — TELEPHONE ENCOUNTER
NEEDS TO SEE TAMMIE IN FOLLOW UP. TO ER IF SHE CANNOT GET A RIDE IN HERE. WE HAVE TRIED [facility], DR BLAIR, [facility], AND IS WORKING WITH OTHER GROUPS. I AM NOT SURE WHAT TO DO TO HELP IN THIS SITUATION.

## 2018-08-31 ENCOUNTER — DOCUMENTATION (OUTPATIENT)
Dept: FAMILY MEDICINE CLINIC | Facility: CLINIC | Age: 53
End: 2018-08-31

## 2018-10-11 ENCOUNTER — TELEPHONE (OUTPATIENT)
Dept: FAMILY MEDICINE CLINIC | Facility: CLINIC | Age: 53
End: 2018-10-11

## 2018-10-11 NOTE — TELEPHONE ENCOUNTER
Patient called stating she has been having a persistent cough and thinks she may have bronchitis. She is scheduled for surgery next week with Dr. Marr with [facility] and wants help to relieve this cough before surgery. She stated she is unable to come in for an appointment but was hoping you could send in a cough suppressant. Please advise, thanks.

## 2018-10-11 NOTE — TELEPHONE ENCOUNTER
PATIENT HAS TO BE SEEN. WE CANNOT SEND SOMETHING IN. SHE CAN TRY MUCINEX DM UNTIL SHE CAN BE SEEN.

## 2018-10-25 ENCOUNTER — TELEPHONE (OUTPATIENT)
Dept: FAMILY MEDICINE CLINIC | Facility: CLINIC | Age: 53
End: 2018-10-25

## 2018-10-25 RX ORDER — PREGABALIN 150 MG/1
150 CAPSULE ORAL 2 TIMES DAILY
Qty: 60 CAPSULE | Refills: 0 | Status: SHIPPED | OUTPATIENT
Start: 2018-10-25 | End: 2018-11-20 | Stop reason: DRUGHIGH

## 2018-10-25 NOTE — TELEPHONE ENCOUNTER
Pt is requesting a refill on her Lyrica medication.   Lyrica 150 mg capsules. Sig: take one capsule PO BID, Q: 60.   She just had surgery.   She is scheduling appointment to come see you next week. She wants to know if you could go ahead and fill this just until her appointment to get her through?

## 2018-10-29 ENCOUNTER — TELEPHONE (OUTPATIENT)
Dept: FAMILY MEDICINE CLINIC | Facility: CLINIC | Age: 53
End: 2018-10-29

## 2018-10-29 ENCOUNTER — OFFICE VISIT (OUTPATIENT)
Dept: FAMILY MEDICINE CLINIC | Facility: CLINIC | Age: 53
End: 2018-10-29

## 2018-10-29 VITALS
OXYGEN SATURATION: 95 % | WEIGHT: 174 LBS | BODY MASS INDEX: 30.83 KG/M2 | TEMPERATURE: 98.5 F | HEIGHT: 63 IN | HEART RATE: 81 BPM | RESPIRATION RATE: 16 BRPM | DIASTOLIC BLOOD PRESSURE: 74 MMHG | SYSTOLIC BLOOD PRESSURE: 112 MMHG

## 2018-10-29 DIAGNOSIS — I10 ESSENTIAL HYPERTENSION: ICD-10-CM

## 2018-10-29 DIAGNOSIS — M81.0 AGE-RELATED OSTEOPOROSIS WITHOUT CURRENT PATHOLOGICAL FRACTURE: ICD-10-CM

## 2018-10-29 DIAGNOSIS — F31.32 BIPOLAR AFFECTIVE DISORDER, CURRENTLY DEPRESSED, MODERATE (HCC): ICD-10-CM

## 2018-10-29 DIAGNOSIS — E03.9 ACQUIRED HYPOTHYROIDISM: ICD-10-CM

## 2018-10-29 DIAGNOSIS — J30.89 ENVIRONMENTAL AND SEASONAL ALLERGIES: ICD-10-CM

## 2018-10-29 DIAGNOSIS — K21.9 GASTROESOPHAGEAL REFLUX DISEASE, ESOPHAGITIS PRESENCE NOT SPECIFIED: ICD-10-CM

## 2018-10-29 DIAGNOSIS — Z79.899 CONTROLLED SUBSTANCE AGREEMENT SIGNED: ICD-10-CM

## 2018-10-29 DIAGNOSIS — E55.9 VITAMIN D DEFICIENCY: ICD-10-CM

## 2018-10-29 DIAGNOSIS — J44.9 CHRONIC OBSTRUCTIVE PULMONARY DISEASE, UNSPECIFIED COPD TYPE (HCC): ICD-10-CM

## 2018-10-29 DIAGNOSIS — Z23 ENCOUNTER FOR IMMUNIZATION: Primary | ICD-10-CM

## 2018-10-29 DIAGNOSIS — E53.8 VITAMIN B 12 DEFICIENCY: ICD-10-CM

## 2018-10-29 LAB
25(OH)D3+25(OH)D2 SERPL-MCNC: 16.6 NG/ML (ref 30–100)
ALBUMIN SERPL-MCNC: 4.8 G/DL (ref 3.5–5.2)
ALBUMIN/GLOB SERPL: 1.9 G/DL
ALP SERPL-CCNC: 88 U/L (ref 39–117)
ALT SERPL-CCNC: 10 U/L (ref 1–33)
AST SERPL-CCNC: 15 U/L (ref 1–32)
BASOPHILS # BLD AUTO: 0.06 10*3/MM3 (ref 0–0.2)
BASOPHILS NFR BLD AUTO: 0.5 % (ref 0–1.5)
BILIRUB SERPL-MCNC: 0.3 MG/DL (ref 0.1–1.2)
BUN SERPL-MCNC: 14 MG/DL (ref 6–20)
BUN/CREAT SERPL: 16.5 (ref 7–25)
CALCIUM SERPL-MCNC: 10 MG/DL (ref 8.6–10.5)
CHLORIDE SERPL-SCNC: 101 MMOL/L (ref 98–107)
CHOLEST SERPL-MCNC: 179 MG/DL (ref 0–200)
CO2 SERPL-SCNC: 28.3 MMOL/L (ref 22–29)
CREAT SERPL-MCNC: 0.85 MG/DL (ref 0.57–1)
EOSINOPHIL # BLD AUTO: 0.19 10*3/MM3 (ref 0–0.7)
EOSINOPHIL NFR BLD AUTO: 1.5 % (ref 0.3–6.2)
ERYTHROCYTE [DISTWIDTH] IN BLOOD BY AUTOMATED COUNT: 13.4 % (ref 11.7–13)
GLOBULIN SER CALC-MCNC: 2.5 GM/DL
GLUCOSE SERPL-MCNC: 80 MG/DL (ref 65–99)
HCT VFR BLD AUTO: 41.4 % (ref 35.6–45.5)
HDLC SERPL-MCNC: 50 MG/DL (ref 40–60)
HGB BLD-MCNC: 13.3 G/DL (ref 11.9–15.5)
IMM GRANULOCYTES # BLD: 0.02 10*3/MM3 (ref 0–0.03)
IMM GRANULOCYTES NFR BLD: 0.2 % (ref 0–0.5)
LDLC SERPL CALC-MCNC: 94 MG/DL (ref 0–100)
LYMPHOCYTES # BLD AUTO: 2.06 10*3/MM3 (ref 0.9–4.8)
LYMPHOCYTES NFR BLD AUTO: 16.6 % (ref 19.6–45.3)
MCH RBC QN AUTO: 30.9 PG (ref 26.9–32)
MCHC RBC AUTO-ENTMCNC: 32.1 G/DL (ref 32.4–36.3)
MCV RBC AUTO: 96.1 FL (ref 80.5–98.2)
MONOCYTES # BLD AUTO: 0.65 10*3/MM3 (ref 0.2–1.2)
MONOCYTES NFR BLD AUTO: 5.2 % (ref 5–12)
NEUTROPHILS # BLD AUTO: 9.43 10*3/MM3 (ref 1.9–8.1)
NEUTROPHILS NFR BLD AUTO: 76.2 % (ref 42.7–76)
PLATELET # BLD AUTO: 314 10*3/MM3 (ref 140–500)
POTASSIUM SERPL-SCNC: 4.2 MMOL/L (ref 3.5–5.2)
PROT SERPL-MCNC: 7.3 G/DL (ref 6–8.5)
RBC # BLD AUTO: 4.31 10*6/MM3 (ref 3.9–5.2)
SODIUM SERPL-SCNC: 143 MMOL/L (ref 136–145)
TRIGL SERPL-MCNC: 176 MG/DL (ref 0–150)
TSH SERPL DL<=0.005 MIU/L-ACNC: 4.06 MIU/ML (ref 0.27–4.2)
VIT B12 SERPL-MCNC: 251 PG/ML (ref 211–946)
VLDLC SERPL CALC-MCNC: 35.2 MG/DL (ref 5–40)
WBC # BLD AUTO: 12.39 10*3/MM3 (ref 4.5–10.7)

## 2018-10-29 PROCEDURE — 99214 OFFICE O/P EST MOD 30 MIN: CPT | Performed by: NURSE PRACTITIONER

## 2018-10-29 PROCEDURE — 90674 CCIIV4 VAC NO PRSV 0.5 ML IM: CPT | Performed by: NURSE PRACTITIONER

## 2018-10-29 PROCEDURE — 90471 IMMUNIZATION ADMIN: CPT | Performed by: NURSE PRACTITIONER

## 2018-10-29 RX ORDER — CETIRIZINE HYDROCHLORIDE 10 MG/1
10 TABLET ORAL DAILY
Qty: 90 TABLET | Refills: 3 | Status: SHIPPED | OUTPATIENT
Start: 2018-10-29 | End: 2019-01-25 | Stop reason: SDUPTHER

## 2018-10-29 RX ORDER — ALBUTEROL SULFATE 90 UG/1
2 AEROSOL, METERED RESPIRATORY (INHALATION) EVERY 4 HOURS PRN
Qty: 18 G | Refills: 6 | Status: SHIPPED | OUTPATIENT
Start: 2018-10-29 | End: 2018-11-30 | Stop reason: SDUPTHER

## 2018-10-29 RX ORDER — IPRATROPIUM BROMIDE AND ALBUTEROL SULFATE 2.5; .5 MG/3ML; MG/3ML
3 SOLUTION RESPIRATORY (INHALATION) 4 TIMES DAILY
Qty: 360 ML | Refills: 3 | Status: SHIPPED | OUTPATIENT
Start: 2018-10-29 | End: 2019-01-25 | Stop reason: SDUPTHER

## 2018-10-29 RX ORDER — RANITIDINE 300 MG/1
300 TABLET ORAL NIGHTLY
Qty: 30 TABLET | Refills: 6 | Status: SHIPPED | OUTPATIENT
Start: 2018-10-29 | End: 2019-01-25 | Stop reason: SDUPTHER

## 2018-10-29 RX ORDER — FLUOXETINE HYDROCHLORIDE 40 MG/1
CAPSULE ORAL
COMMUNITY
Start: 2018-09-25 | End: 2018-10-29 | Stop reason: SDUPTHER

## 2018-10-29 RX ORDER — BUSPIRONE HYDROCHLORIDE 15 MG/1
15 TABLET ORAL 2 TIMES DAILY
Qty: 60 TABLET | Refills: 6 | Status: SHIPPED | OUTPATIENT
Start: 2018-10-29 | End: 2019-01-25 | Stop reason: SDUPTHER

## 2018-10-29 RX ORDER — IPRATROPIUM BROMIDE AND ALBUTEROL SULFATE 2.5; .5 MG/3ML; MG/3ML
SOLUTION RESPIRATORY (INHALATION)
COMMUNITY
Start: 2018-10-11 | End: 2018-10-29 | Stop reason: SDUPTHER

## 2018-10-29 RX ORDER — HYDROCODONE BITARTRATE AND ACETAMINOPHEN 5; 325 MG/1; MG/1
TABLET ORAL
Refills: 0 | COMMUNITY
Start: 2018-10-18 | End: 2019-01-25

## 2018-10-29 RX ORDER — LISINOPRIL AND HYDROCHLOROTHIAZIDE 25; 20 MG/1; MG/1
1 TABLET ORAL DAILY
Qty: 30 TABLET | Refills: 6 | Status: SHIPPED | OUTPATIENT
Start: 2018-10-29 | End: 2019-01-25 | Stop reason: SDUPTHER

## 2018-10-29 RX ORDER — HYDROXYZINE HYDROCHLORIDE 25 MG/1
25 TABLET, FILM COATED ORAL DAILY
Qty: 30 TABLET | Refills: 6 | Status: SHIPPED | OUTPATIENT
Start: 2018-10-29 | End: 2019-11-07 | Stop reason: SDUPTHER

## 2018-10-29 RX ORDER — FLUTICASONE PROPIONATE 50 MCG
1 SPRAY, SUSPENSION (ML) NASAL DAILY
Qty: 16 G | Refills: 6 | Status: SHIPPED | OUTPATIENT
Start: 2018-10-29 | End: 2019-01-25 | Stop reason: SDUPTHER

## 2018-10-29 RX ORDER — POTASSIUM CHLORIDE 750 MG/1
10 TABLET, FILM COATED, EXTENDED RELEASE ORAL ONCE
Qty: 30 TABLET | Refills: 6 | Status: SHIPPED | OUTPATIENT
Start: 2018-10-29 | End: 2018-10-29

## 2018-10-29 RX ORDER — ALENDRONATE SODIUM 70 MG/1
70 TABLET ORAL
Qty: 4 TABLET | Refills: 12 | Status: SHIPPED | OUTPATIENT
Start: 2018-10-29 | End: 2019-01-25 | Stop reason: SDUPTHER

## 2018-10-29 RX ORDER — ARIPIPRAZOLE 5 MG/1
5 TABLET ORAL DAILY
Qty: 30 TABLET | Refills: 6 | Status: SHIPPED | OUTPATIENT
Start: 2018-10-29 | End: 2019-01-25 | Stop reason: SDUPTHER

## 2018-10-29 RX ORDER — FLUOXETINE HYDROCHLORIDE 40 MG/1
40 CAPSULE ORAL DAILY
Qty: 30 CAPSULE | Refills: 6 | Status: SHIPPED | OUTPATIENT
Start: 2018-10-29 | End: 2019-01-25 | Stop reason: SDUPTHER

## 2018-10-29 RX ORDER — HYDROXYZINE HYDROCHLORIDE 25 MG/1
TABLET, FILM COATED ORAL
COMMUNITY
Start: 2018-09-25 | End: 2018-10-29 | Stop reason: SDUPTHER

## 2018-10-29 NOTE — TELEPHONE ENCOUNTER
Lyrica was ordered for her on October 25, 2018,   60 tablets sent to her pharmacy. Will not be reordered until approximately November 24, 2018.

## 2018-10-29 NOTE — PROGRESS NOTES
Subjective   Va Pastor is a 53 y.o. female. Patient is being evaluated today for medication management on hypertension, hypothyroidism, depression, COPD, and Bipolar. She is also receiving her influenza vaccination today.     History of Present Illness 53-year-old  female presents to the office today to discuss long-term management of hypertension which is controlled with lisinopril hydrochlorothiazide 20/25 mg tablet per day and potassium 10 MGQ CR tablet per day. Hypothyroidism treated with levothyroxine 25 µg tablet per day. Depression and bipolar treated with M Mary 55 mg tablet produced are 15 mg tablet Prozac 40 mg capsule. Patient also has COPD treated with Ventolin rescue inhaler, fluticasone nasal spray, pre-oh Elipta 200/25 mg per inhalation aerosol powder, Atrovent 0.02% nebulizer solution, duo nebs 0.5/2.5 mg/3 ML nebulizer solution and theophylline 400 mg per 24 hour capsule. Patient has not been seen by me in this office for over a year. These medicines have been prescribed by other providers whom she is seeing recently. She offers no complaints with her medication. I will ask her if she is wishing to reestablish with our office for primary care. Patient also wishes to get an influenza vaccine today.    The following portions of the patient's history were reviewed and updated as appropriate: allergies, current medications, past family history, past medical history, past social history, past surgical history and problem list.    Review of Systems   Respiratory:        COPD.  Asthma.    Cardiovascular:        Hypertension.    Endocrine:        Hypothyroidism.    Psychiatric/Behavioral: Positive for depressed mood.        Bipolar.    All other systems reviewed and are negative.      Objective   Physical Exam   Constitutional: She is oriented to person, place, and time. She appears well-developed and well-nourished.   HENT:   Head: Normocephalic and atraumatic.   Eyes: Pupils are equal,  round, and reactive to light. Conjunctivae and EOM are normal.   Glasses patient wearing glasses   Neck: Normal range of motion. Neck supple.   Cardiovascular: Normal rate and regular rhythm.    Pulmonary/Chest: Effort normal.   Expiratory bilaterally expiratory and inspiratory wheezes noted on the right upper lobe anterior and posteriorly   Abdominal: Soft. Bowel sounds are normal.   Musculoskeletal: Normal range of motion.   Neurological: She is alert and oriented to person, place, and time.   Skin: Skin is warm and dry. Capillary refill takes less than 2 seconds.   Psychiatric: She has a normal mood and affect. Her behavior is normal. Judgment and thought content normal.   Nursing note and vitals reviewed.        Assessment/Plan   Va was seen today for med management and immunizations.    Diagnoses and all orders for this visit:    Encounter for immunization  -     Flucelvax Quad=>4Years ([id number])  -     CBC & Differential  -     Comprehensive Metabolic Panel  -     Lipid Panel  -     TSH  -     Vitamin B12  -     Vitamin D 25 Hydroxy    Acquired hypothyroidism  -     CBC & Differential  -     TSH    Bipolar affective disorder, currently depressed, moderate (CMS/HCC)  -     CBC & Differential  -     Comprehensive Metabolic Panel  -     Lipid Panel  -     TSH  -     Vitamin B12  -     Vitamin D 25 Hydroxy  -     ARIPiprazole (ABILIFY) 5 MG tablet; Take 1 tablet by mouth Daily.  -     busPIRone (BUSPAR) 15 MG tablet; Take 1 tablet by mouth 2 (Two) Times a Day. For anxiety  -     FLUoxetine (PROzac) 40 MG capsule; Take 1 capsule by mouth Daily.  -     hydrOXYzine (ATARAX) 25 MG tablet; Take 1 tablet by mouth Daily.    Chronic obstructive pulmonary disease, unspecified COPD type (CMS/HCC)  -     CBC & Differential  -     Comprehensive Metabolic Panel  -     ipratropium (ATROVENT) 0.02 % nebulizer solution; Take 2.5 mL by nebulization 4 (Four) Times a Day.  -     fluticasone ([employer] FLUTICASONE PROPIONATE) 50  MCG/ACT nasal spray; 1 spray into the nostril(s) as directed by provider Daily.  -     ipratropium-albuterol (DUO-NEB) 0.5-2.5 mg/3 ml nebulizer; Take 3 mL by nebulization 4 (Four) Times a Day.  -     theophylline (AURORA-24) 400 MG 24 hr capsule; Take 1 capsule by mouth Daily.  -     albuterol (VENTOLIN HFA) 108 (90 Base) MCG/ACT inhaler; Inhale 2 puffs Every 4 (Four) Hours As Needed for Wheezing.  -     Ambulatory Referral to Pulmonology    Vitamin B 12 deficiency  -     Vitamin B12    Vitamin D deficiency  -     Vitamin D 25 Hydroxy    Age-related osteoporosis without current pathological fracture  -     alendronate (FOSAMAX) 70 MG tablet; Take 1 tablet by mouth Every 7 (Seven) Days.    Gastroesophageal reflux disease, esophagitis presence not specified  -     raNITIdine (ZANTAC) 300 MG tablet; Take 1 tablet by mouth Every Night.    Environmental and seasonal allergies  -     cetirizine (zyrTEC) 10 MG tablet; Take 1 tablet by mouth Daily. For allergies    Essential hypertension  -     lisinopril-hydrochlorothiazide (PRINZIDE,ZESTORETIC) 20-25 MG per tablet; Take 1 tablet by mouth Daily.  -     potassium chloride (K-DUR) 10 MEQ CR tablet; Take 1 tablet by mouth 1 (One) Time for 1 dose.    Labs drawn today will be called once resulted. Medications renewed as needed. Patient's being referred to pulmonology for her advanced COPD. Patient understands the need to follow-up every 6 months with this office in order to coordinate a plan of care. To notify office immediately for any decline in health status. To eat a heart healthy low-fat high-fiber diet full of fruits and vegetables drinking six-day glasses of water a day and ambulating 30 minutes per day as able. This will help maintain bone density, muscle mass, heart health and has been known to elevate the psyche. Stressed importance of smoking cessation. Patient will let me know when she is ready to quit and we will prescribe medication to help her.

## 2018-10-29 NOTE — TELEPHONE ENCOUNTER
This has been worked out with the pharmacy and the pt.   They was just waiting on the PA response.

## 2018-10-29 NOTE — PROGRESS NOTES
I have reviewed the notes, assessments, and/or procedures performed by Gladys Mckoy, I concur with her/his documentation of Va Pastor.

## 2018-10-30 ENCOUNTER — TELEPHONE (OUTPATIENT)
Dept: FAMILY MEDICINE CLINIC | Facility: CLINIC | Age: 53
End: 2018-10-30

## 2018-10-30 DIAGNOSIS — E03.9 ACQUIRED HYPOTHYROIDISM: ICD-10-CM

## 2018-10-30 DIAGNOSIS — E55.9 VITAMIN D DEFICIENCY: Primary | ICD-10-CM

## 2018-10-30 RX ORDER — ERGOCALCIFEROL 1.25 MG/1
50000 CAPSULE ORAL WEEKLY
Qty: 20 CAPSULE | Refills: 0 | Status: SHIPPED | OUTPATIENT
Start: 2018-10-30 | End: 2019-01-25 | Stop reason: SDUPTHER

## 2018-10-30 RX ORDER — LEVOTHYROXINE SODIUM 0.03 MG/1
25 TABLET ORAL DAILY
Qty: 90 TABLET | Refills: 1 | Status: SHIPPED | OUTPATIENT
Start: 2018-10-30 | End: 2019-01-25 | Stop reason: SDUPTHER

## 2018-10-30 NOTE — TELEPHONE ENCOUNTER
----- Message from AL Baldwin sent at 10/29/2018  4:03 PM EDT -----  Please ask patient to schedule a physical exam she has multiple things on her healthcare maintenance list that need to be addressed

## 2018-11-05 LAB — DRUGS UR: NORMAL

## 2018-11-20 ENCOUNTER — OFFICE VISIT (OUTPATIENT)
Dept: FAMILY MEDICINE CLINIC | Facility: CLINIC | Age: 53
End: 2018-11-20

## 2018-11-20 VITALS
HEIGHT: 63 IN | DIASTOLIC BLOOD PRESSURE: 80 MMHG | OXYGEN SATURATION: 98 % | WEIGHT: 170 LBS | TEMPERATURE: 97.7 F | HEART RATE: 81 BPM | SYSTOLIC BLOOD PRESSURE: 128 MMHG | RESPIRATION RATE: 16 BRPM | BODY MASS INDEX: 30.12 KG/M2

## 2018-11-20 DIAGNOSIS — J06.9 UPPER RESPIRATORY TRACT INFECTION, UNSPECIFIED TYPE: Primary | ICD-10-CM

## 2018-11-20 DIAGNOSIS — R05.9 COUGH: ICD-10-CM

## 2018-11-20 PROCEDURE — 99213 OFFICE O/P EST LOW 20 MIN: CPT | Performed by: NURSE PRACTITIONER

## 2018-11-20 RX ORDER — SENNOSIDES 8.6 MG
650 CAPSULE ORAL EVERY 8 HOURS PRN
Qty: 90 TABLET | Refills: 0 | Status: SHIPPED | OUTPATIENT
Start: 2018-11-20 | End: 2019-10-05 | Stop reason: HOSPADM

## 2018-11-20 RX ORDER — DEXTROMETHORPHAN HYDROBROMIDE AND PROMETHAZINE HYDROCHLORIDE 15; 6.25 MG/5ML; MG/5ML
5 SYRUP ORAL 4 TIMES DAILY PRN
Qty: 240 ML | Refills: 0 | Status: SHIPPED | OUTPATIENT
Start: 2018-11-20 | End: 2019-01-25

## 2018-11-20 RX ORDER — IBUPROFEN 200 MG
200 TABLET ORAL EVERY 6 HOURS PRN
Qty: 90 TABLET | Refills: 0 | Status: SHIPPED | OUTPATIENT
Start: 2018-11-20 | End: 2019-01-25

## 2018-11-20 RX ORDER — PREGABALIN 200 MG/1
200 CAPSULE ORAL 2 TIMES DAILY
Qty: 60 CAPSULE | Refills: 0 | Status: SHIPPED | OUTPATIENT
Start: 2018-11-20 | End: 2018-12-21 | Stop reason: SDUPTHER

## 2018-11-20 RX ORDER — AMOXICILLIN 875 MG/1
875 TABLET, COATED ORAL 2 TIMES DAILY
Qty: 20 TABLET | Refills: 0 | Status: SHIPPED | OUTPATIENT
Start: 2018-11-20 | End: 2018-11-30

## 2018-11-20 NOTE — PROGRESS NOTES
Subjective   Va Pastor is a 53 y.o. female. Patient is being seen today for head and nasal congestion, earache, and sore throat. She would also like to discuss her Lyrica with you.     History of Present Illness 53-year-old  female presents with sudden onset of upper airway congestion, bilateral earache and sore throat. These symptoms have been constant for  1 week. Has treated with albuterol rescue inhaler, Zyrtec 10 mg tablet, Elipta, Atrovent, duo nebs and theophylline. Nothing makes better or worse. On a scale of 1-10 rates her discomfort as 9.5 .    Patient also wants to discuss her Lyrica. Patient is already on Abilify, use far, Prozac, hydroxyzine and Lyrica 150 mg capsule per two times a day. Questing increase to 200 mg capsules twice a day.           The following portions of the patient's history were reviewed and updated as appropriate: allergies, current medications, past family history, past medical history, past social history, past surgical history and problem list.    Review of Systems   HENT: Positive for congestion, ear pain, postnasal drip and sore throat.    Respiratory: Positive for cough.         COPD, upper airway congestion   Endocrine:        Acquired hypothyroidism   Musculoskeletal:        Neuropathy, torticollis   Allergic/Immunologic:        Vitamin B12 and vitamin D deficiency   Psychiatric/Behavioral:        Bipolar with depression   All other systems reviewed and are negative.      Objective   Physical Exam   Constitutional: She is oriented to person, place, and time. She appears well-developed and well-nourished.   HENT:   Head: Normocephalic and atraumatic.   Right tympanic membrane red and swollen ear canal also reddened. Nasal membranes red swollen and dry. Throat and oral oropharynx reddened with whitish markings in tonsillar area, grayish postnasal drip present   Eyes: EOM are normal. Pupils are equal, round, and reactive to light.   Glasses   Neck: Normal range of  motion. Neck supple.   Cardiovascular: Normal rate and regular rhythm.   Pulmonary/Chest: Effort normal and breath sounds normal.   Abdominal: Soft. Bowel sounds are normal.   Musculoskeletal:   Multiple arthralgias myalgias   Neurological: She is alert and oriented to person, place, and time.   Chronic neuropathy pain   Skin: Skin is warm and dry. Capillary refill takes less than 2 seconds.   Psychiatric: She has a normal mood and affect. Her behavior is normal. Judgment and thought content normal.   Nursing note and vitals reviewed.        Assessment/Plan   Va was seen today for nasal congestion, earache and sore throat.    Diagnoses and all orders for this visit:    Upper respiratory tract infection, unspecified type  -     amoxicillin (AMOXIL) 875 MG tablet; Take 1 tablet by mouth 2 (Two) Times a Day for 10 days.    Cough  -     promethazine-dextromethorphan (PROMETHAZINE-DM) 6.25-15 MG/5ML syrup; Take 5 mL by mouth 4 (Four) Times a Day As Needed for Cough.    Other orders  -     pregabalin (LYRICA) 200 MG capsule; Take 1 capsule by mouth 2 (Two) Times a Day.  -     acetaminophen (TYLENOL) 650 MG 8 hr tablet; Take 1 tablet by mouth Every 8 (Eight) Hours As Needed for Mild Pain .    Patient to alternate ibuprofen and Tylenol for body ache and headache. Will treat upper respiratory tract infection with amoxicillin 875 mg one tablet twice a day for 10 days. Will treat cough with promethazine DM 1 teaspoon 4 times a day as needed for cough. Patient aware that it will make her sleepy. Encouraged her to stay indoors out of inclement weather sipping fluids constantly during the waking hours. To allow herself 8-10 hours of sleep per night. Stressing again the importance of hydration. Fluid is more important than eating food at this point. Follow-up in one week if she does not feel that she is improving sooner if worse. Made aware that cough will last 6-8 weeks. Encouraged patient to suck on cough drops or hard  candies such as butter scotch or peppermint to help keep throat moist.

## 2018-11-30 DIAGNOSIS — J44.9 CHRONIC OBSTRUCTIVE PULMONARY DISEASE, UNSPECIFIED COPD TYPE (HCC): ICD-10-CM

## 2018-11-30 RX ORDER — ALBUTEROL SULFATE 90 UG/1
2 AEROSOL, METERED RESPIRATORY (INHALATION) EVERY 4 HOURS PRN
Qty: 18 G | Refills: 1 | Status: SHIPPED | OUTPATIENT
Start: 2018-11-30 | End: 2019-01-25 | Stop reason: SDUPTHER

## 2018-12-21 DIAGNOSIS — L90.5 SCAR CONDITION AND FIBROSIS OF SKIN: Primary | ICD-10-CM

## 2018-12-21 RX ORDER — PREGABALIN 200 MG/1
200 CAPSULE ORAL 2 TIMES DAILY
Qty: 60 CAPSULE | Refills: 0 | Status: SHIPPED | OUTPATIENT
Start: 2018-12-21 | End: 2019-01-25 | Stop reason: SDUPTHER

## 2018-12-21 RX ORDER — PREGABALIN 200 MG/1
200 CAPSULE ORAL 2 TIMES DAILY
Qty: 60 CAPSULE | Refills: 0 | Status: SHIPPED | OUTPATIENT
Start: 2018-12-21 | End: 2018-12-21 | Stop reason: SDUPTHER

## 2018-12-21 NOTE — TELEPHONE ENCOUNTER
Accidentally printed her a prescription so I am voiding that one out and sending the new prescription on Epic

## 2019-01-22 DIAGNOSIS — L90.5 SCAR CONDITION AND FIBROSIS OF SKIN: ICD-10-CM

## 2019-01-22 DIAGNOSIS — J44.9 CHRONIC OBSTRUCTIVE PULMONARY DISEASE, UNSPECIFIED COPD TYPE (HCC): ICD-10-CM

## 2019-01-25 ENCOUNTER — OFFICE VISIT (OUTPATIENT)
Dept: FAMILY MEDICINE CLINIC | Facility: CLINIC | Age: 54
End: 2019-01-25

## 2019-01-25 VITALS
SYSTOLIC BLOOD PRESSURE: 112 MMHG | TEMPERATURE: 98.6 F | OXYGEN SATURATION: 98 % | HEIGHT: 63 IN | WEIGHT: 171.4 LBS | HEART RATE: 88 BPM | RESPIRATION RATE: 16 BRPM | DIASTOLIC BLOOD PRESSURE: 66 MMHG | BODY MASS INDEX: 30.37 KG/M2

## 2019-01-25 DIAGNOSIS — K21.9 GASTROESOPHAGEAL REFLUX DISEASE, ESOPHAGITIS PRESENCE NOT SPECIFIED: ICD-10-CM

## 2019-01-25 DIAGNOSIS — M81.0 AGE-RELATED OSTEOPOROSIS WITHOUT CURRENT PATHOLOGICAL FRACTURE: ICD-10-CM

## 2019-01-25 DIAGNOSIS — E03.9 ACQUIRED HYPOTHYROIDISM: ICD-10-CM

## 2019-01-25 DIAGNOSIS — I10 ESSENTIAL HYPERTENSION: ICD-10-CM

## 2019-01-25 DIAGNOSIS — F32.A ANXIETY AND DEPRESSION: ICD-10-CM

## 2019-01-25 DIAGNOSIS — J30.89 ENVIRONMENTAL AND SEASONAL ALLERGIES: ICD-10-CM

## 2019-01-25 DIAGNOSIS — M79.7 FIBROMYALGIA: Primary | ICD-10-CM

## 2019-01-25 DIAGNOSIS — F41.9 ANXIETY AND DEPRESSION: ICD-10-CM

## 2019-01-25 DIAGNOSIS — L90.5 SCAR CONDITION AND FIBROSIS OF SKIN: ICD-10-CM

## 2019-01-25 DIAGNOSIS — J44.9 CHRONIC OBSTRUCTIVE PULMONARY DISEASE, UNSPECIFIED COPD TYPE (HCC): ICD-10-CM

## 2019-01-25 DIAGNOSIS — E55.9 VITAMIN D DEFICIENCY: ICD-10-CM

## 2019-01-25 DIAGNOSIS — F31.32 BIPOLAR AFFECTIVE DISORDER, CURRENTLY DEPRESSED, MODERATE (HCC): ICD-10-CM

## 2019-01-25 PROBLEM — J06.9 UPPER RESPIRATORY TRACT INFECTION: Status: RESOLVED | Noted: 2018-11-20 | Resolved: 2019-01-25

## 2019-01-25 PROCEDURE — 99214 OFFICE O/P EST MOD 30 MIN: CPT | Performed by: NURSE PRACTITIONER

## 2019-01-25 RX ORDER — ERGOCALCIFEROL 1.25 MG/1
50000 CAPSULE ORAL WEEKLY
Qty: 20 CAPSULE | Refills: 0 | Status: SHIPPED | OUTPATIENT
Start: 2019-01-25 | End: 2019-06-08

## 2019-01-25 RX ORDER — ARIPIPRAZOLE 5 MG/1
5 TABLET ORAL DAILY
Qty: 30 TABLET | Refills: 6 | Status: SHIPPED | OUTPATIENT
Start: 2019-01-25 | End: 2019-06-03

## 2019-01-25 RX ORDER — PREGABALIN 200 MG/1
200 CAPSULE ORAL 2 TIMES DAILY
Qty: 60 CAPSULE | Refills: 0 | Status: SHIPPED | OUTPATIENT
Start: 2019-01-25 | End: 2019-02-25 | Stop reason: SDUPTHER

## 2019-01-25 RX ORDER — POTASSIUM CHLORIDE 750 MG/1
10 TABLET, FILM COATED, EXTENDED RELEASE ORAL 2 TIMES DAILY
Qty: 60 TABLET | Refills: 6 | Status: SHIPPED | OUTPATIENT
Start: 2019-01-25 | End: 2019-09-04 | Stop reason: SDUPTHER

## 2019-01-25 RX ORDER — ALENDRONATE SODIUM 70 MG/1
70 TABLET ORAL
Qty: 4 TABLET | Refills: 12 | Status: SHIPPED | OUTPATIENT
Start: 2019-01-25 | End: 2020-01-23 | Stop reason: SDUPTHER

## 2019-01-25 RX ORDER — FLUTICASONE PROPIONATE 50 MCG
1 SPRAY, SUSPENSION (ML) NASAL DAILY
Qty: 16 G | Refills: 6 | Status: SHIPPED | OUTPATIENT
Start: 2019-01-25 | End: 2019-09-04 | Stop reason: SDUPTHER

## 2019-01-25 RX ORDER — FLUOXETINE HYDROCHLORIDE 40 MG/1
40 CAPSULE ORAL DAILY
Qty: 30 CAPSULE | Refills: 6 | Status: SHIPPED | OUTPATIENT
Start: 2019-01-25 | End: 2019-05-07

## 2019-01-25 RX ORDER — RANITIDINE 300 MG/1
300 TABLET ORAL NIGHTLY
Qty: 30 TABLET | Refills: 6 | Status: SHIPPED | OUTPATIENT
Start: 2019-01-25 | End: 2019-09-04 | Stop reason: SDUPTHER

## 2019-01-25 RX ORDER — LISINOPRIL AND HYDROCHLOROTHIAZIDE 25; 20 MG/1; MG/1
1 TABLET ORAL DAILY
Qty: 30 TABLET | Refills: 6 | Status: SHIPPED | OUTPATIENT
Start: 2019-01-25 | End: 2019-06-03

## 2019-01-25 RX ORDER — BUSPIRONE HYDROCHLORIDE 15 MG/1
15 TABLET ORAL 2 TIMES DAILY
Qty: 60 TABLET | Refills: 6 | Status: SHIPPED | OUTPATIENT
Start: 2019-01-25 | End: 2019-08-22

## 2019-01-25 RX ORDER — ALBUTEROL SULFATE 90 UG/1
2 AEROSOL, METERED RESPIRATORY (INHALATION) EVERY 4 HOURS PRN
Qty: 18 G | Refills: 3 | Status: SHIPPED | OUTPATIENT
Start: 2019-01-25 | End: 2019-04-22 | Stop reason: SDUPTHER

## 2019-01-25 RX ORDER — CETIRIZINE HYDROCHLORIDE 10 MG/1
10 TABLET ORAL DAILY
Qty: 90 TABLET | Refills: 3 | Status: SHIPPED | OUTPATIENT
Start: 2019-01-25 | End: 2020-02-06 | Stop reason: SDUPTHER

## 2019-01-25 RX ORDER — LEVOTHYROXINE SODIUM 0.03 MG/1
25 TABLET ORAL DAILY
Qty: 90 TABLET | Refills: 1 | Status: SHIPPED | OUTPATIENT
Start: 2019-01-25 | End: 2019-07-28 | Stop reason: SDUPTHER

## 2019-01-25 RX ORDER — IPRATROPIUM BROMIDE AND ALBUTEROL SULFATE 2.5; .5 MG/3ML; MG/3ML
3 SOLUTION RESPIRATORY (INHALATION) 4 TIMES DAILY
Qty: 360 ML | Refills: 3 | Status: SHIPPED | OUTPATIENT
Start: 2019-01-25 | End: 2019-04-22 | Stop reason: SDUPTHER

## 2019-01-25 NOTE — PROGRESS NOTES
Subjective   Va Pastor is a 53 y.o. female. Patient is being evaluated today for medication management for COPD, Fibrosis, hypothyroidism, hypertension, GERD, and Vitamin D Deficiency.    History of Present Illness 53-year-old  female presents to the office today to discuss long-term medical management of COPD fibrosis hypothyroidism hypertension GERD and vitamin D deficiency. Labs current until May 2018. Needs medication agreement Roland and compliance collected today for renewal of Lyrica 200 mg twice a day for fibromyalgia and neuropathy.    The following portions of the patient's history were reviewed and updated as appropriate: allergies, current medications, past family history, past medical history, past social history, past surgical history and problem list.    Review of Systems   Respiratory:        COPD.    Cardiovascular:        Hypertension.    Gastrointestinal: Positive for GERD.   Endocrine:        Hypothyroidism.    Musculoskeletal:        Fibrosis.    Hematological:        Vitamin D Deficiency.    All other systems reviewed and are negative.      Objective   Physical Exam   Constitutional: She is oriented to person, place, and time. She appears well-developed and well-nourished.   HENT:   Head: Normocephalic and atraumatic.   Eyes: EOM are normal. Pupils are equal, round, and reactive to light.   Neck: Normal range of motion. Neck supple.   Cardiovascular: Normal rate and regular rhythm.   Pulmonary/Chest: Effort normal.   Slightly diminished  All fields   Abdominal: Soft. Bowel sounds are normal.   Musculoskeletal: Normal range of motion.   Neurological: She is alert and oriented to person, place, and time.   Skin: Skin is warm and dry. Capillary refill takes less than 2 seconds.   Psychiatric: She has a normal mood and affect. Her behavior is normal. Judgment and thought content normal.   Nursing note and vitals reviewed.        Assessment/Plan   Va was seen today for med  management.    Diagnoses and all orders for this visit:    Fibromyalgia  -     Compliance Drug Analysis, Ur - Urine, Clean Catch; Future  -     Compliance Drug Analysis, Ur - Urine, Clean Catch    Chronic obstructive pulmonary disease, unspecified COPD type (CMS/[facility])  -     albuterol sulfate HFA (VENTOLIN HFA) 108 (90 Base) MCG/ACT inhaler; Inhale 2 puffs Every 4 (Four) Hours As Needed for Wheezing.  -     fluticasone ([employer] FLUTICASONE PROPIONATE) 50 MCG/ACT nasal spray; 1 spray into the nostril(s) as directed by provider Daily.  -     Fluticasone Furoate-Vilanterol (BREO ELLIPTA) 200-25 MCG/INH inhaler; Inhale 1 puff Daily.  -     ipratropium (ATROVENT) 0.02 % nebulizer solution; Take 2.5 mL by nebulization 4 (Four) Times a Day.  -     ipratropium-albuterol (DUO-NEB) 0.5-2.5 mg/3 ml nebulizer; Take 3 mL by nebulization 4 (Four) Times a Day.  -     theophylline (AURORA-24) 400 MG 24 hr capsule; Take 1 capsule by mouth Daily.  -     Ambulatory Referral to Pulmonology    Age-related osteoporosis without current pathological fracture  -     alendronate (FOSAMAX) 70 MG tablet; Take 1 tablet by mouth Every 7 (Seven) Days.    Environmental and seasonal allergies  -     cetirizine (zyrTEC) 10 MG tablet; Take 1 tablet by mouth Daily. For allergies    Acquired hypothyroidism  -     levothyroxine (LEVOTHROID) 25 MCG tablet; Take 1 tablet by mouth Daily.    Essential hypertension  -     lisinopril-hydrochlorothiazide (PRINZIDE,ZESTORETIC) 20-25 MG per tablet; Take 1 tablet by mouth Daily.    Gastroesophageal reflux disease, esophagitis presence not specified  -     raNITIdine (ZANTAC) 300 MG tablet; Take 1 tablet by mouth Every Night.    Vitamin D deficiency  -     vitamin D (ERGOCALCIFEROL) 55286 units capsule capsule; Take 1 capsule by mouth 1 (One) Time Per Week for 20 doses.    Scar condition and fibrosis of skin    Anxiety and depression  -     Ambulatory Referral to Psychiatry    Bipolar affective disorder, currently  depressed, moderate (CMS/HCC)  -     ARIPiprazole (ABILIFY) 5 MG tablet; Take 1 tablet by mouth Daily.  -     busPIRone (BUSPAR) 15 MG tablet; Take 1 tablet by mouth 2 (Two) Times a Day. For anxiety  -     FLUoxetine (PROzac) 40 MG capsule; Take 1 capsule by mouth Daily.    Other orders  -     potassium chloride (K-DUR) 10 MEQ CR tablet; Take 1 tablet by mouth 2 (Two) Times a Day.    Labs current. Medications renewed. New medication agreement signed, Gustavo ordered, compliance drug screen collected. Patient will need to make another follow-up to discuss smoking cessation would like to try the patch or the gum. Referred to pulmonology to address her COPD and multiple medications. Referring to psychiatry to address multiple meds used for her anxiety and depression. Patient states they are not helping. To follow-up with this office every 3-6 months and as needed.

## 2019-01-28 ENCOUNTER — TELEPHONE (OUTPATIENT)
Dept: FAMILY MEDICINE CLINIC | Facility: CLINIC | Age: 54
End: 2019-01-28

## 2019-01-28 LAB — SPECIMEN STATUS: NORMAL

## 2019-01-28 NOTE — TELEPHONE ENCOUNTER
[facility] was unable to process drug screen. Pt was informed. She is having surgery this week and will try to come in next week.

## 2019-02-25 DIAGNOSIS — L90.5 SCAR CONDITION AND FIBROSIS OF SKIN: ICD-10-CM

## 2019-03-25 DIAGNOSIS — L90.5 SCAR CONDITION AND FIBROSIS OF SKIN: ICD-10-CM

## 2019-04-22 DIAGNOSIS — J44.9 CHRONIC OBSTRUCTIVE PULMONARY DISEASE, UNSPECIFIED COPD TYPE (HCC): ICD-10-CM

## 2019-04-22 DIAGNOSIS — L90.5 SCAR CONDITION AND FIBROSIS OF SKIN: ICD-10-CM

## 2019-04-22 RX ORDER — ALBUTEROL SULFATE 90 UG/1
AEROSOL, METERED RESPIRATORY (INHALATION)
Qty: 18 G | Refills: 0 | Status: SHIPPED | OUTPATIENT
Start: 2019-04-22 | End: 2019-10-15 | Stop reason: SDUPTHER

## 2019-04-22 RX ORDER — IPRATROPIUM BROMIDE AND ALBUTEROL SULFATE 2.5; .5 MG/3ML; MG/3ML
SOLUTION RESPIRATORY (INHALATION)
Qty: 180 ML | Refills: 0 | Status: SHIPPED | OUTPATIENT
Start: 2019-04-22 | End: 2019-10-05 | Stop reason: HOSPADM

## 2019-04-22 NOTE — TELEPHONE ENCOUNTER
Last medication agreement: 01/25/2019. Pt needs to complete a new one with Dr. Phillip.   Last UDS: 10/29/2019.  Pt does need a new winnie.  Can you complete winnie and send to Dr. Phillip?

## 2019-05-07 ENCOUNTER — TELEPHONE (OUTPATIENT)
Dept: FAMILY MEDICINE CLINIC | Facility: CLINIC | Age: 54
End: 2019-05-07

## 2019-05-07 ENCOUNTER — OFFICE VISIT (OUTPATIENT)
Dept: FAMILY MEDICINE CLINIC | Facility: CLINIC | Age: 54
End: 2019-05-07

## 2019-05-07 VITALS
RESPIRATION RATE: 16 BRPM | HEART RATE: 98 BPM | BODY MASS INDEX: 33.35 KG/M2 | HEIGHT: 63 IN | SYSTOLIC BLOOD PRESSURE: 124 MMHG | DIASTOLIC BLOOD PRESSURE: 84 MMHG | OXYGEN SATURATION: 98 % | WEIGHT: 188.2 LBS | TEMPERATURE: 99.6 F

## 2019-05-07 DIAGNOSIS — G25.81 RLS (RESTLESS LEGS SYNDROME): ICD-10-CM

## 2019-05-07 DIAGNOSIS — F41.9 ANXIETY AND DEPRESSION: Primary | ICD-10-CM

## 2019-05-07 DIAGNOSIS — F32.A ANXIETY AND DEPRESSION: Primary | ICD-10-CM

## 2019-05-07 PROCEDURE — 99214 OFFICE O/P EST MOD 30 MIN: CPT | Performed by: FAMILY MEDICINE

## 2019-05-07 RX ORDER — ESCITALOPRAM OXALATE 20 MG/1
20 TABLET ORAL DAILY
Qty: 30 TABLET | Refills: 5 | Status: SHIPPED | OUTPATIENT
Start: 2019-05-07 | End: 2019-06-03

## 2019-05-07 NOTE — PATIENT INSTRUCTIONS
Please take the Prozac 40 mg every other day for 1 week and then stop.  At the same time start the Lexapro half tablet of the 20 mg for 1 week and then increase to 1 tablet.

## 2019-05-07 NOTE — PROGRESS NOTES
Subjective   Va Pastor is a 54 y.o. female. Presents today for medication management for anxiety/depression, RLS     History of Present Illness   New patient to me    RLS - Stable.  Needs some refills on her Requip.  She does not know the milligram of the medication and then remember that she had initially got up from [employer].  She is going to go home and look at the medication and let me know what the dose was.  She says without the medication she has creepy crawly feelings in her legs and they will go on for hours before she can finally get any rest.  She tolerates the medication very well.    Anxiety/Depression - Uncontrolled.  On Prozac 40mg for a long time but it's not working anymore.  Starting to have increased anxiety, depression, low energy, poor eating, but no suicidal thoughts.  She was on another medication at some point but she cannot remember very well.  She thinks it may have been Zoloft.    The following portions of the patient's history were reviewed and updated as appropriate: allergies, current medications, past family history, past medical history, past social history, past surgical history and problem list.    Review of Systems   Constitutional: Negative for activity change, appetite change, fatigue and fever.   HENT: Negative for ear pain, facial swelling and sore throat.    Eyes: Negative for discharge and itching.   Respiratory: Negative for cough, chest tightness and shortness of breath.    Cardiovascular: Negative for chest pain and palpitations.   Gastrointestinal: Negative for abdominal distention and constipation.   Endocrine: Negative for polydipsia, polyphagia and polyuria.   Genitourinary: Negative for difficulty urinating and flank pain.   Musculoskeletal: Negative for arthralgias and back pain.   Skin: Negative for color change, rash and wound.   Allergic/Immunologic: Negative for environmental allergies and food allergies.   Neurological: Negative for weakness and numbness.    Hematological: Negative for adenopathy. Does not bruise/bleed easily.   Psychiatric/Behavioral: Positive for dysphoric mood. Negative for decreased concentration. The patient is nervous/anxious.        Objective   Physical Exam   Constitutional: She is oriented to person, place, and time. She appears well-developed and well-nourished. No distress.   HENT:   Mouth/Throat: Oropharynx is clear and moist. No oropharyngeal exudate.   Neck: Neck supple.   Cardiovascular: Normal rate, regular rhythm and normal heart sounds. Exam reveals no gallop and no friction rub.   No murmur heard.  Pulmonary/Chest: Effort normal and breath sounds normal. She has no wheezes. She has no rales.   Abdominal: Soft. Bowel sounds are normal. She exhibits no distension. There is no tenderness.   Musculoskeletal: She exhibits no edema or deformity.   Lymphadenopathy:     She has no cervical adenopathy.   Neurological: She is alert and oriented to person, place, and time.   Skin: Skin is warm and dry. No rash noted.   Psychiatric: Her speech is normal and behavior is normal. Judgment and thought content normal. Cognition and memory are normal. She exhibits a depressed mood. She is attentive.   Nursing note and vitals reviewed.      Assessment/Plan   Va was seen today for med management.    Diagnoses and all orders for this visit:    Anxiety and depression  -     escitalopram (LEXAPRO) 20 MG tablet; Take 1 tablet by mouth Daily.    RLS (restless legs syndrome)      Will start on the Lexapro and taper off the Prozac over the next week.  Regarding the restless leg, I am happy to give her the Requip but I need to know the dose.  I asked her to go home and find the bottle and give me a call when she knows the dose and I will send some in for her.

## 2019-05-07 NOTE — TELEPHONE ENCOUNTER
Patient called with her dosage of Ropinirole 0.25 mg- take one or two tablets by mouth at bed time. Okay to refill?

## 2019-05-08 DIAGNOSIS — G25.81 RLS (RESTLESS LEGS SYNDROME): Primary | ICD-10-CM

## 2019-05-08 RX ORDER — ROPINIROLE 0.25 MG/1
TABLET, FILM COATED ORAL
Qty: 60 TABLET | Refills: 3 | Status: SHIPPED | OUTPATIENT
Start: 2019-05-08 | End: 2020-03-21 | Stop reason: SDUPTHER

## 2019-05-22 DIAGNOSIS — J44.9 CHRONIC OBSTRUCTIVE PULMONARY DISEASE, UNSPECIFIED COPD TYPE (HCC): ICD-10-CM

## 2019-06-03 ENCOUNTER — OFFICE VISIT (OUTPATIENT)
Dept: FAMILY MEDICINE CLINIC | Facility: CLINIC | Age: 54
End: 2019-06-03

## 2019-06-03 VITALS
RESPIRATION RATE: 16 BRPM | SYSTOLIC BLOOD PRESSURE: 156 MMHG | DIASTOLIC BLOOD PRESSURE: 90 MMHG | OXYGEN SATURATION: 98 % | BODY MASS INDEX: 33.77 KG/M2 | WEIGHT: 190.6 LBS | HEIGHT: 63 IN | TEMPERATURE: 98.1 F | HEART RATE: 79 BPM

## 2019-06-03 DIAGNOSIS — F32.A ANXIETY AND DEPRESSION: Primary | ICD-10-CM

## 2019-06-03 DIAGNOSIS — F41.9 ANXIETY AND DEPRESSION: Primary | ICD-10-CM

## 2019-06-03 PROCEDURE — 99214 OFFICE O/P EST MOD 30 MIN: CPT | Performed by: FAMILY MEDICINE

## 2019-06-03 RX ORDER — FLUOXETINE 10 MG/1
CAPSULE ORAL
Qty: 14 CAPSULE | Refills: 0 | Status: SHIPPED | OUTPATIENT
Start: 2019-06-03 | End: 2019-07-25

## 2019-06-03 RX ORDER — FLUOXETINE HYDROCHLORIDE 40 MG/1
40 CAPSULE ORAL DAILY
COMMUNITY
End: 2019-06-03 | Stop reason: SDUPTHER

## 2019-06-03 NOTE — PROGRESS NOTES
Subjective   Va Pastor is a 54 y.o. female. Presents today for medication management for anxiety and depression.     History of Present Illness     Anxiety/Depression - uncontrolled.  Appetite changes, thoughts of suicide with no plan or intent.  Possible hx of bipolar per chart but patient denying manic episodes.  She has seen a psychiatrist before.  Interested in sertraline which she is on before.  She is interested in going fishing and camping if she can get her symptoms controlled.  She also endorses crying spells and is crying off and on in the room.    The following portions of the patient's history were reviewed and updated as appropriate: allergies, current medications, past family history, past medical history, past social history, past surgical history and problem list.    Review of Systems   Constitutional: Negative for fatigue and fever.   Respiratory: Negative for shortness of breath and wheezing.    Cardiovascular: Negative for chest pain and palpitations.   Gastrointestinal: Negative for constipation and nausea.   Psychiatric/Behavioral: Positive for decreased concentration, dysphoric mood and sleep disturbance. Negative for agitation, behavioral problems and confusion. The patient is nervous/anxious.        Objective   Physical Exam   Constitutional: She appears well-developed and well-nourished. No distress.   Cardiovascular: Normal rate, regular rhythm and normal heart sounds. Exam reveals no gallop and no friction rub.   No murmur heard.  Pulmonary/Chest: Effort normal and breath sounds normal. She has no wheezes. She has no rales.   Skin: Skin is warm. Capillary refill takes less than 2 seconds. She is not diaphoretic.   Psychiatric: Her speech is normal and behavior is normal. Judgment and thought content normal. Her affect is labile. Cognition and memory are normal. She exhibits a depressed mood. She is attentive.   Nursing note and vitals reviewed.      Assessment/Plan   Va was seen  today for med management.    Diagnoses and all orders for this visit:    Anxiety and depression  -     FLUoxetine (PROzac) 10 MG capsule; Take 3 caps daily for 3 days then 2 caps daily for 3 days and then 1 cap daily for 3 days.  -     sertraline (ZOLOFT) 50 MG tablet; Take 1 tablet daily for 1 week and then increase to 2 tablets daily.      Patient requested switching back to Zoloft which she says had some good benefit in the past.  Spent approximately 30 minutes in the room with greater than 50% of the time counseling face-to-face regarding especially depression, the risks, benefits, alternatives to switching between fluoxetine to sertraline.  I also spent quite a bit of time explaining the cross taper from fluoxetine over to sertraline and the possible side effects of doing such a thing.  Also explained the risks of suicidal thoughts and that she would need to report those immediately to either someone she trusts or call my office to let me know about it.  I also asked that she follow-up in 1 month for us to go over the cross-taper and see how she is doing on the Zoloft as her primary antidepressant.

## 2019-06-03 NOTE — PATIENT INSTRUCTIONS
Major Depressive Disorder, Adult  Major depressive disorder (MDD) is a mental health condition. It may also be called clinical depression or unipolar depression. MDD usually causes feelings of sadness, hopelessness, or helplessness. MDD can also cause physical symptoms. It can interfere with work, school, relationships, and other everyday activities. MDD may be mild, moderate, or severe. It may occur once (single episode major depressive disorder) or it may occur multiple times (recurrent major depressive disorder).  What are the causes?  The exact cause of this condition is not known. MDD is most likely caused by a combination of things, which may include:  · Genetic factors. These are traits that are passed along from parent to child.  · Individual factors. Your personality, your behavior, and the way you handle your thoughts and feelings may contribute to MDD. This includes personality traits and behaviors learned from others.  · Physical factors, such as:  ? Differences in the part of your brain that controls emotion. This part of your brain may be different than it is in people who do not have MDD.  ? Long-term (chronic) medical or psychiatric illnesses.  · Social factors. Traumatic experiences or major life changes may play a role in the development of MDD.    What increases the risk?  This condition is more likely to develop in women. The following factors may also make you more likely to develop MDD:  · A family history of depression.  · Troubled family relationships.  · Abnormally low levels of certain brain chemicals.  · Traumatic events in childhood, especially abuse or the loss of a parent.  · Being under a lot of stress, or long-term stress, especially from upsetting life experiences or losses.  · A history of:  ? Chronic physical illness.  ? Other mental health disorders.  ? Substance abuse.  · Poor living conditions.  · Experiencing social exclusion or discrimination on a regular basis.    What are  the signs or symptoms?  The main symptoms of MDD typically include:  · Constant depressed or irritable mood.  · Loss of interest in things and activities.    MDD symptoms may also include:  · Sleeping or eating too much or too little.  · Unexplained weight change.  · Fatigue or low energy.  · Feelings of worthlessness or guilt.  · Difficulty thinking clearly or making decisions.  · Thoughts of suicide or of harming others.  · Physical agitation or weakness.  · Isolation.    Severe cases of MDD may also occur with other symptoms, such as:  · Delusions or hallucinations, in which you imagine things that are not real (psychotic depression).  · Low-level depression that lasts at least a year (chronic depression or persistent depressive disorder).  · Extreme sadness and hopelessness (melancholic depression).  · Trouble speaking and moving (catatonic depression).    How is this diagnosed?  This condition may be diagnosed based on:  · Your symptoms.  · Your medical history, including your mental health history. This may involve tests to evaluate your mental health. You may be asked questions about your lifestyle, including any drug and alcohol use, and how long you have had symptoms of MDD.  · A physical exam.  · Blood tests to rule out other conditions.    You must have a depressed mood and at least four other MDD symptoms most of the day, nearly every day in the same 2-week timeframe before your health care provider can confirm a diagnosis of MDD.  How is this treated?  This condition is usually treated by mental health professionals, such as psychologists, psychiatrists, and clinical social workers. You may need more than one type of treatment. Treatment may include:  · Psychotherapy. This is also called talk therapy or counseling. Types of psychotherapy include:  ? Cognitive behavioral therapy (CBT). This type of therapy teaches you to recognize unhealthy feelings, thoughts, and behaviors, and replace them with  positive thoughts and actions.  ? Interpersonal therapy (IPT). This helps you to improve the way you relate to and communicate with others.  ? Family therapy. This treatment includes members of your family.  · Medicine to treat anxiety and depression, or to help you control certain emotions and behaviors.  · Lifestyle changes, such as:  ? Limiting alcohol and drug use.  ? Exercising regularly.  ? Getting plenty of sleep.  ? Making healthy eating choices.  ? Spending more time outdoors.    Treatments involving stimulation of the brain can be used in situations with extremely severe symptoms, or when medicine or other therapies do not work over time. These treatments include electroconvulsive therapy, transcranial magnetic stimulation, and vagal nerve stimulation.  Follow these instructions at home:  Activity  · Return to your normal activities as told by your health care provider.  · Exercise regularly and spend time outdoors as told by your health care provider.  General instructions  · Take over-the-counter and prescription medicines only as told by your health care provider.  · Do not drink alcohol. If you drink alcohol, limit your alcohol intake to no more than 1 drink a day for nonpregnant women and 2 drinks a day for men. One drink equals 12 oz of beer, 5 oz of wine, or 1½ oz of hard liquor. Alcohol can affect any antidepressant medicines you are taking. Talk to your health care provider about your alcohol use.  · Eat a healthy diet and get plenty of sleep.  · Find activities that you enjoy doing, and make time to do them.  · Consider joining a support group. Your health care provider may be able to recommend a support group.  · Keep all follow-up visits as told by your health care provider. This is important.  Where to find more information  National [place] on Mental Illness  · www.edgar.org    [place] National [place] of Mental Health  · www.nimh.nih.gov    National Suicide Prevention  [facility]  · 2-540-894-TALK (9352). This is free, 24-hour help.    Contact a health care provider if:  · Your symptoms get worse.  · You develop new symptoms.  Get help right away if:  · You self-harm.  · You have serious thoughts about hurting yourself or others.  · You see, hear, taste, smell, or feel things that are not present (hallucinate).  This information is not intended to replace advice given to you by your health care provider. Make sure you discuss any questions you have with your health care provider.  Document Released: 04/14/2014 Document Revised: 08/24/2017 Document Reviewed: 06/28/2017  [employer] Interactive Patient Education © 2019 [employer]

## 2019-07-22 DIAGNOSIS — L90.5 SCAR CONDITION AND FIBROSIS OF SKIN: ICD-10-CM

## 2019-07-22 NOTE — TELEPHONE ENCOUNTER
Last medication agreement:  01/25/2019  Last winnie: completed today and will be in your folder.   Last UDS: 01/25/2019   Last OV: 06/03/2019   Upcoming OV: 07/25/2019

## 2019-07-25 ENCOUNTER — OFFICE VISIT (OUTPATIENT)
Dept: FAMILY MEDICINE CLINIC | Facility: CLINIC | Age: 54
End: 2019-07-25

## 2019-07-25 VITALS
WEIGHT: 192.6 LBS | OXYGEN SATURATION: 98 % | BODY MASS INDEX: 34.12 KG/M2 | TEMPERATURE: 98.1 F | HEIGHT: 63 IN | DIASTOLIC BLOOD PRESSURE: 64 MMHG | SYSTOLIC BLOOD PRESSURE: 110 MMHG | RESPIRATION RATE: 16 BRPM | HEART RATE: 114 BPM

## 2019-07-25 DIAGNOSIS — M19.90 GENERALIZED ARTHRITIS: ICD-10-CM

## 2019-07-25 DIAGNOSIS — F42.4 DERMATILLOMANIA IN ADULT: ICD-10-CM

## 2019-07-25 DIAGNOSIS — E03.9 HYPOTHYROIDISM, UNSPECIFIED TYPE: ICD-10-CM

## 2019-07-25 DIAGNOSIS — F41.9 ANXIETY AND DEPRESSION: Primary | ICD-10-CM

## 2019-07-25 DIAGNOSIS — F32.A ANXIETY AND DEPRESSION: Primary | ICD-10-CM

## 2019-07-25 PROCEDURE — 99214 OFFICE O/P EST MOD 30 MIN: CPT | Performed by: FAMILY MEDICINE

## 2019-07-25 RX ORDER — MELOXICAM 15 MG/1
15 TABLET ORAL DAILY
Qty: 30 TABLET | Refills: 3 | Status: SHIPPED | OUTPATIENT
Start: 2019-07-25 | End: 2019-12-13 | Stop reason: SDUPTHER

## 2019-07-25 RX ORDER — SERTRALINE HYDROCHLORIDE 100 MG/1
150 TABLET, FILM COATED ORAL DAILY
Qty: 45 TABLET | Refills: 3 | Status: SHIPPED | OUTPATIENT
Start: 2019-07-25 | End: 2019-08-22

## 2019-07-25 NOTE — PROGRESS NOTES
Subjective   Va Pastor is a 54 y.o. female. Presents today for follow up on anxiety/depression and hypothyroidism.  Also curious about skin picking disorder.    History of Present Illness     Anxiety depression-stable.  Patient was being switched from Prozac over to Zoloft.  She says that overall her depression is much better but it could be further improved.  She would like to increase the Zoloft to the next level.  Denies any suicidal or homicidal thoughts.  Concentration is doing better than it was before.    Hypothyroidism-stable.  Patient taking levothyroxine.  She is due for labs today.  She would like a refill on her medication once the labs have gone back.  Denies any symptoms of hypothyroidism such as constipation, mood swings, cold intolerance.    Also has some questions about skin picking.  She says she is been doing that for years but after her son  about 6 years ago is been getting progressively worse.  She says she is got scars up and down her arms and was wondering if there is any thing could be done about that.    Later mentions she wanted the medication for generalized arthritis.  We discussed this briefly in the past.  But never put her on any medication for it.    The following portions of the patient's history were reviewed and updated as appropriate: allergies, current medications, past family history, past medical history, past social history, past surgical history and problem list.    Review of Systems   Constitutional: Negative for activity change, appetite change, fatigue and fever.   HENT: Negative for ear pain, facial swelling and sore throat.    Eyes: Negative for discharge and itching.   Respiratory: Negative for cough, chest tightness and shortness of breath.    Cardiovascular: Negative for chest pain and palpitations.   Gastrointestinal: Negative for abdominal distention and constipation.   Endocrine: Negative for polydipsia, polyphagia and polyuria.   Genitourinary: Negative  for difficulty urinating and flank pain.   Musculoskeletal: Negative for arthralgias and back pain.   Skin: Negative for color change, rash and wound.   Allergic/Immunologic: Negative for environmental allergies and food allergies.   Neurological: Negative for weakness and numbness.   Hematological: Negative for adenopathy. Does not bruise/bleed easily.   Psychiatric/Behavioral: Negative for decreased concentration and dysphoric mood. The patient is nervous/anxious.        Objective   Physical Exam   Constitutional: She is oriented to person, place, and time. She appears well-developed and well-nourished. No distress.   HENT:   Mouth/Throat: Oropharynx is clear and moist. No oropharyngeal exudate.   Eyes: Conjunctivae are normal. Right eye exhibits no discharge. Left eye exhibits no discharge.   Neck: Neck supple.   Cardiovascular: Normal rate, regular rhythm and normal heart sounds. Exam reveals no gallop and no friction rub.   No murmur heard.  Pulmonary/Chest: Effort normal and breath sounds normal. She has no wheezes. She has no rales.   Abdominal: Soft. Bowel sounds are normal. She exhibits no distension. There is no tenderness.   Musculoskeletal: She exhibits no edema or deformity.   Lymphadenopathy:     She has no cervical adenopathy.   Neurological: She is alert and oriented to person, place, and time.   Skin: Skin is warm and dry. No rash noted.   Multiple scars of her bilateral upper extremities   Psychiatric: Her speech is normal and behavior is normal. Judgment and thought content normal. Her mood appears anxious. Cognition and memory are normal. She exhibits a depressed mood. She is attentive.   Nursing note and vitals reviewed.      Assessment/Plan   Va was seen today for follow-up.    Diagnoses and all orders for this visit:    Anxiety and depression  -     sertraline (ZOLOFT) 100 MG tablet; Take 1.5 tablets by mouth Daily.  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    Increase his sertraline to 1-1/2 tablets daily.  Patient has also getting get a TSH today to follow-up on her thyroid and then will refill her medicine accordingly.  For the dermatillomania I provided her a little model with fake skin to pick on and also recommended that she go purchase some gloves such as nitrile gloves to put on when she is feeling like she is trying to pick.  The SSRI should also help with that.  Also prescribed her some meloxicam for the generalized arthritis.  I will talk with her more about that the future.  See her back in about 6 weeks.

## 2019-07-26 LAB — TSH SERPL DL<=0.005 MIU/L-ACNC: 2.3 UIU/ML (ref 0.45–4.5)

## 2019-07-28 ENCOUNTER — TELEPHONE (OUTPATIENT)
Dept: FAMILY MEDICINE CLINIC | Facility: CLINIC | Age: 54
End: 2019-07-28

## 2019-07-28 DIAGNOSIS — E03.9 ACQUIRED HYPOTHYROIDISM: ICD-10-CM

## 2019-07-28 RX ORDER — LEVOTHYROXINE SODIUM 0.03 MG/1
25 TABLET ORAL DAILY
Qty: 90 TABLET | Refills: 3 | Status: ON HOLD | OUTPATIENT
Start: 2019-07-28 | End: 2019-10-03

## 2019-08-12 DIAGNOSIS — J44.9 CHRONIC OBSTRUCTIVE PULMONARY DISEASE, UNSPECIFIED COPD TYPE (HCC): ICD-10-CM

## 2019-08-16 ENCOUNTER — OFFICE VISIT (OUTPATIENT)
Dept: FAMILY MEDICINE CLINIC | Facility: CLINIC | Age: 54
End: 2019-08-16

## 2019-08-16 VITALS
TEMPERATURE: 98.4 F | RESPIRATION RATE: 16 BRPM | HEIGHT: 63 IN | BODY MASS INDEX: 34.84 KG/M2 | SYSTOLIC BLOOD PRESSURE: 140 MMHG | DIASTOLIC BLOOD PRESSURE: 84 MMHG | WEIGHT: 196.6 LBS | HEART RATE: 86 BPM | OXYGEN SATURATION: 96 %

## 2019-08-16 DIAGNOSIS — L08.9 SKIN INFECTION: ICD-10-CM

## 2019-08-16 DIAGNOSIS — T14.8XXA SKIN TRAUMA: ICD-10-CM

## 2019-08-16 DIAGNOSIS — F31.12 BIPOLAR AFFECTIVE DISORDER, CURRENTLY MANIC, MODERATE (HCC): Primary | ICD-10-CM

## 2019-08-16 DIAGNOSIS — F42.4 COMPULSIVE SKIN PICKING: ICD-10-CM

## 2019-08-16 PROCEDURE — 99215 OFFICE O/P EST HI 40 MIN: CPT | Performed by: FAMILY MEDICINE

## 2019-08-16 RX ORDER — CEPHALEXIN 500 MG/1
500 CAPSULE ORAL 3 TIMES DAILY
Qty: 30 CAPSULE | Refills: 0 | Status: SHIPPED | OUTPATIENT
Start: 2019-08-16 | End: 2019-08-22

## 2019-08-16 NOTE — PATIENT INSTRUCTIONS
Liliya  Liliya is a condition that affects people who have certain mood disorders. Liliya involves episodes of emotional highs that include having very high energy, racing thoughts, very high self-esteem, and decreased ability to concentrate. These episodes are very intense and can last longer than a week. In some cases, episodes of liliya can be so strong that people with this condition need to be hospitalized for their safety and the safety of people around them.  What are the causes?  The cause of this condition is not known.  What increases the risk?  You are more likely to develop liliya if you have a mood disorder, especially bipolar disorder.  If you have a mood disorder, the following factors may increase your risk of developing liliya:  · Not getting enough sleep.  · Using substances such as tobacco, caffeine, or illegal drugs.  · Certain prescription medicines, such as antidepressants or antibiotics.  · Stress or emotional events.  · Certain seasons. Liliya is more common in spring and summer.  · The period of time after having a baby (postpartum period).  What are the signs or symptoms?  Symptoms of this condition include:  · Periods of having very high energy that may last longer than a week. In some cases, you have so much energy that you may become unsafe and need to go to the hospital.  · Very high self-esteem or self-confidence.  · Decreased need for sleep.  · Being unusually talkative, or feeling a need to keep talking. Speech may be very fast. It may seem like you cannot stop talking.  · Racing thoughts or constant talking, with quick shifts between topics that may or may not be related (flight of ideas).  · Decreased ability to focus or concentrate.  · Increased purposeful activity, such as work, study, or social activity.  · Increased nonproductive activity. This could be pacing, squirming and fidgeting, or finger and toe tapping.  · Impulsive behavior and poor judgment. This may result in high-risk  activities, such as having unprotected sex or spending a lot of money.  · Having false beliefs (delusions) or seeing, hearing, or feeling things that do not exist (hallucinations).  How is this diagnosed?  This condition may be diagnosed based on:  · Your symptoms and medical history.  · A physical exam. Your health care provider will check for physical conditions that may be causing your symptoms.  · A mental health evaluation. You may be referred to a mental health provider who specializes in diagnosing and treating mood disorders.  How is this treated?  This condition may be treated with:  · Medicines, such as mood stabilizers.  · Talk therapy (psychotherapy) with a mental health provider.  · A procedure to change the brain chemicals that send messages between brain cells (neurotransmitters). This procedure, called electroconvulsive therapy (ECT), applies short electrical pulses to the brain through the scalp. This may be used in cases of severe ben when other treatments have not helped.  Follow these instructions at home:  · Take over-the-counter and prescription medicines only as told by your health care provider.  · Try to go to sleep and wake up at the same time every day.  · Make and follow a routine for daily meal times.  · Ask for support from family, friends, or relatives to make sure you stay on track with your treatment.  · Keep all follow-up visits as told by your health care provider. This is important.  Contact a health care provider if:  · You have concerns about your treatment.  · You have side effects from your prescription medicines.  · Your symptoms do not improve or they get worse.  · Your ben may be putting your health, or others' health, at risk.  Get help right away if:  · You think about hurting yourself or you try to hurt yourself.  · You think about suicide.  If you ever feel like you may hurt yourself or others, or have thoughts about taking your own life, get help right away. You  can go to your nearest emergency department or call:  · Your local emergency services (911 in the [place]  · A suicide crisis helpline, such as the National Suicide Prevention Lifeline at 1-387.888.1414. This is open 24 hours a day.  Summary  · Liliya involves episodes of emotional highs that include having very high energy, racing thoughts, very high self-esteem, and decreased ability to concentrate.  · Episodes of liliya are very intense and can last longer than a week.  · Treatment for liliya may include medicines and talk therapy (psychotherapy).  This information is not intended to replace advice given to you by your health care provider. Make sure you discuss any questions you have with your health care provider.  Document Released: 04/13/2018 Document Revised: 04/13/2018 Document Reviewed: 04/13/2018  [employer] Interactive Patient Education © 2019 [employer].

## 2019-08-16 NOTE — PROGRESS NOTES
Kia Pastor is a 54 y.o. female. Presents today for medication management for bipolar disorder, depression, and anxiety. Patient is having an issue with picking at skin on arms to the point of causing sores. Patients daughter thinks the zoloft is causing her to do this.     History of Present Illness     I have reviewed the history from the MA and is accurate with the patient.  The daughter believes she is having a manic episode and the patient is in agreement.  Been getting progressively worse since taking zoloft.  The patient agrees that she likely is having a manic episode.  She has been taking very extensively at her arms and her legs.  She has open wounds on her left arm from her elbow down to her wrist and healing wounds on her right arm down approximately the same location.  She says in the past they had to put her on a combination of Geodon and Zyprexa in order to get her manic episode under control.  She is interested in possibly going to inpatient facility but she cannot go today but she says she could go tomorrow.  She is interested in going to the [facility] but not necessarily [facility].    The following portions of the patient's history were reviewed and updated as appropriate: allergies, current medications, past family history, past medical history, past social history, past surgical history and problem list.    Review of Systems   Skin: Positive for wound.   Psychiatric/Behavioral: Positive for agitation, behavioral problems, decreased concentration, dysphoric mood, self-injury and sleep disturbance. Negative for confusion, hallucinations and suicidal ideas. The patient is nervous/anxious. The patient is not hyperactive.    All other systems reviewed and are negative.      Objective   Physical Exam   Constitutional: She appears well-developed and well-nourished. No distress.   Cardiovascular: Normal rate, regular rhythm and normal heart sounds. Exam reveals no gallop and no friction  rub.   No murmur heard.  Pulmonary/Chest: Effort normal and breath sounds normal. She has no wheezes. She has no rales.   Skin: Skin is warm. Capillary refill takes less than 2 seconds. She is not diaphoretic.   Multiple open wounds from the elbows down to the wrists bilaterally with the left side appearing more fresh.  No surrounding erythema or signs of infection currently.   Nursing note and vitals reviewed.      Assessment/Plan   Va was seen today for med management.    Diagnoses and all orders for this visit:    Bipolar affective disorder, currently manic, moderate (CMS/HCC)    Skin trauma  -     cephalexin (KEFLEX) 500 MG capsule; Take 1 capsule by mouth 3 (Three) Times a Day.    Skin infection  -     cephalexin (KEFLEX) 500 MG capsule; Take 1 capsule by mouth 3 (Three) Times a Day.    Compulsive skin picking    I spent at least 40 minutes in the room with greater than 50% of the time counseling regarding bipolar disorder especially manic episodes, medication management, and providing therapeutic environment to discuss getting the patient into an inpatient environment.  She says she was willing to do it possibly tomorrow and her daughter was willing to take her at that point.  We also discussed her trauma to her skin with the skin picking.  We provided bandages and bandaged her before she left as well as started her on cephalexin just to be on the safe side that she is been picking with her nails.  She did not exhibit active suicidal ideation nor homicidal ideation nor florid psychosis therefore could not hospitalize her involuntarily.

## 2019-08-22 ENCOUNTER — TELEPHONE (OUTPATIENT)
Dept: FAMILY MEDICINE CLINIC | Facility: CLINIC | Age: 54
End: 2019-08-22

## 2019-08-22 ENCOUNTER — OFFICE VISIT (OUTPATIENT)
Dept: FAMILY MEDICINE CLINIC | Facility: CLINIC | Age: 54
End: 2019-08-22

## 2019-08-22 VITALS
HEART RATE: 92 BPM | OXYGEN SATURATION: 95 % | RESPIRATION RATE: 17 BRPM | SYSTOLIC BLOOD PRESSURE: 148 MMHG | HEIGHT: 63 IN | TEMPERATURE: 98.2 F | DIASTOLIC BLOOD PRESSURE: 90 MMHG | BODY MASS INDEX: 34.66 KG/M2 | WEIGHT: 195.6 LBS

## 2019-08-22 DIAGNOSIS — F31.12 BIPOLAR AFFECTIVE DISORDER, CURRENTLY MANIC, MODERATE (HCC): ICD-10-CM

## 2019-08-22 DIAGNOSIS — F31.12 BIPOLAR AFFECTIVE DISORDER, CURRENTLY MANIC, MODERATE (HCC): Primary | ICD-10-CM

## 2019-08-22 PROCEDURE — 99215 OFFICE O/P EST HI 40 MIN: CPT | Performed by: FAMILY MEDICINE

## 2019-08-22 RX ORDER — OLANZAPINE 7.5 MG/1
7.5 TABLET ORAL NIGHTLY
Qty: 30 TABLET | Refills: 3 | Status: SHIPPED | OUTPATIENT
Start: 2019-08-22 | End: 2019-08-22 | Stop reason: SDUPTHER

## 2019-08-22 RX ORDER — OLANZAPINE 7.5 MG/1
7.5 TABLET ORAL NIGHTLY
Qty: 30 TABLET | Refills: 3 | Status: SHIPPED | OUTPATIENT
Start: 2019-08-22 | End: 2019-10-02 | Stop reason: SDDI

## 2019-08-22 NOTE — TELEPHONE ENCOUNTER
Patient's daughter called and [facility] can't get the Zyprexa 7.5 mg until tomorrow but [facility] [phone] has it, she would like it sent there  Her best call back is [phone]

## 2019-08-22 NOTE — PATIENT INSTRUCTIONS
Bipolar 1 Disorder  Bipolar 1 disorder is a mental health disorder in which a person has episodes of emotional highs (ben), and may also have episodes of emotional lows (depression) in addition to highs. Bipolar 1 disorder is different from other bipolar disorders because it involves extreme manic episodes. These episodes last at least one week or involve symptoms that are so severe that hospitalization is needed to keep the person safe.  What increases the risk?  The cause of this condition is not known. However, certain factors make you more likely to have bipolar disorder, such as:  · Having a family member with the disorder.  · An imbalance of certain chemicals in the brain (neurotransmitters).  · Stress, such as illness, financial problems, or a death.  · Certain conditions that affect the brain or spinal cord (neurologic conditions).  · Brain injury (trauma).  · Having another mental health disorder, such as:  ? Obsessive compulsive disorder.  ? Schizophrenia.  What are the signs or symptoms?  Symptoms of ben include:  · Very high self-esteem or self-confidence.  · Decreased need for sleep.  · Unusual talkativeness or feeling a need to keep talking. Speech may be very fast. It may seem like you cannot stop talking.  · Racing thoughts or constant talking, with quick shifts between topics that may or may not be related (flight of ideas).  · Decreased ability to focus or concentrate.  · Increased purposeful activity, such as work, studies, or social activity.  · Increased nonproductive activity. This could be pacing, squirming and fidgeting, or finger and toe tapping.  · Impulsive behavior and poor judgment. This may result in high-risk activities, such as having unprotected sex or spending a lot of money.  Symptoms of depression include:  · Feeling sad, hopeless, or helpless.  · Frequent or uncontrollable crying.  · Lack of feeling or caring about anything.  · Sleeping too much.  · Moving more slowly than  usual.  · Not being able to enjoy things you used to enjoy.  · Wanting to be alone all the time.  · Feeling guilty or worthless.  · Lack of energy or motivation.  · Trouble concentrating or remembering.  · Trouble making decisions.  · Increased appetite.  · Thoughts of death, or the desire to harm yourself.  Sometimes, you may have a mixed mood. This means having symptoms of depression and ben. Stress can make symptoms worse.  How is this diagnosed?  To diagnose bipolar disorder, your health care provider may ask about your:  · Emotional episodes.  · Medical history.  · Alcohol and drug use. This includes prescription medicines. Certain medical conditions and substances can cause symptoms that seem like bipolar disorder (secondary bipolar disorder).  How is this treated?  Bipolar disorder is a long-term (chronic) illness. It is best controlled with ongoing (continuous) treatment rather than treatment only when symptoms occur. Treatment may include:  · Medicine. Medicine can be prescribed by a provider who specializes in treating mental disorders (psychiatrist).  ? Medicines called mood stabilizers are usually prescribed.  ? If symptoms occur even while taking a mood stabilizer, other medicines may be added.  · Psychotherapy. Some forms of talk therapy, such as cognitive-behavioral therapy (CBT), can provide support, education, and guidance.  · Coping methods, such as journaling or relaxation exercises. These may include:  ? Yoga.  ? Meditation.  ? Deep breathing.  · Lifestyle changes, such as:  ? Limiting alcohol and drug use.  ? Exercising regularly.  ? Getting plenty of sleep.  ? Making healthy eating choices.    A combination of medicine, talk therapy, and coping methods is best. A procedure in which electricity is applied to the brain through the scalp (electroconvulsive therapy) may be used in cases of severe ben when medicine and psychotherapy work too slowly or do not work.  Follow these instructions at  home:  Activity    · Return to your normal activities as told by your health care provider.  · Find activities that you enjoy, and make time to do them.  · Exercise regularly as told by your health care provider.  Lifestyle  · Limit alcohol intake to no more than 1 drink a day for nonpregnant women and 2 drinks a day for men. One drink equals 12 oz of beer, 5 oz of wine, or 1½ oz of hard liquor.  · Follow a set schedule for eating and sleeping.  · Eat a balanced diet that includes fresh fruits and vegetables, whole grains, low-fat dairy, and lean meat.  · Get 7-8 hours of sleep each night.  General instructions  · Take over-the-counter and prescription medicines only as told by your health care provider.  · Think about joining a support group. Your health care provider may be able to recommend a support group.  · Talk with your family and loved ones about your treatment goals and how they can help.  · Keep all follow-up visits as told by your health care provider. This is important.  Where to find more information  For more information about bipolar disorder, visit the following websites:  · National [place] on Mental Illness: www.edgar.org  · U[place] National [place] of Mental Health: www.nimh.nih.gov  Contact a health care provider if:  · Your symptoms get worse.  · You have side effects from your medicine, and they get worse.  · You have trouble sleeping.  · You have trouble doing daily activities.  · You feel unsafe in your surroundings.  · You are dealing with substance abuse.  Get help right away if:  · You have new symptoms.  · You have thoughts about harming yourself.  · You self-harm.  This information is not intended to replace advice given to you by your health care provider. Make sure you discuss any questions you have with your health care provider.  Document Released: 03/26/2002 Document Revised: 08/13/2017 Document Reviewed: 08/17/2017  Elsevier Interactive Patient Education © 2019 Elsevier Inc.
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
## 2019-08-24 ENCOUNTER — HOSPITAL ENCOUNTER (EMERGENCY)
Facility: HOSPITAL | Age: 54
Discharge: PSYCHIATRIC OR SUBSTANCE USE FACILITY | End: 2019-08-26
Attending: EMERGENCY MEDICINE | Admitting: EMERGENCY MEDICINE

## 2019-08-24 DIAGNOSIS — R45.851 SUICIDAL IDEATION: Primary | ICD-10-CM

## 2019-08-24 LAB
ETHANOL BLD-MCNC: <10 MG/DL (ref 0–10)
ETHANOL UR QL: <0.01 %

## 2019-08-24 PROCEDURE — 80307 DRUG TEST PRSMV CHEM ANLYZR: CPT | Performed by: NURSE PRACTITIONER

## 2019-08-24 PROCEDURE — 90791 PSYCH DIAGNOSTIC EVALUATION: CPT

## 2019-08-24 PROCEDURE — 99285 EMERGENCY DEPT VISIT HI MDM: CPT

## 2019-08-24 RX ORDER — ACETAMINOPHEN 500 MG
1000 TABLET ORAL ONCE
Status: COMPLETED | OUTPATIENT
Start: 2019-08-24 | End: 2019-08-24

## 2019-08-24 RX ORDER — ALBUTEROL SULFATE 2.5 MG/3ML
2.5 SOLUTION RESPIRATORY (INHALATION) ONCE
Status: COMPLETED | OUTPATIENT
Start: 2019-08-24 | End: 2019-08-25

## 2019-08-24 RX ORDER — SODIUM CHLORIDE 0.9 % (FLUSH) 0.9 %
10 SYRINGE (ML) INJECTION AS NEEDED
Status: DISCONTINUED | OUTPATIENT
Start: 2019-08-24 | End: 2019-08-26 | Stop reason: HOSPADM

## 2019-08-24 RX ADMIN — ACETAMINOPHEN 1000 MG: 500 TABLET, FILM COATED ORAL at 22:41

## 2019-08-25 LAB
ALBUMIN SERPL-MCNC: 4.3 G/DL (ref 3.5–5.2)
ALBUMIN/GLOB SERPL: 2 G/DL
ALP SERPL-CCNC: 87 U/L (ref 39–117)
ALT SERPL W P-5'-P-CCNC: 7 U/L (ref 1–33)
AMPHET+METHAMPHET UR QL: NEGATIVE
ANION GAP SERPL CALCULATED.3IONS-SCNC: 7.3 MMOL/L (ref 5–15)
AST SERPL-CCNC: 12 U/L (ref 1–32)
BARBITURATES UR QL SCN: NEGATIVE
BASOPHILS # BLD AUTO: 0.07 10*3/MM3 (ref 0–0.2)
BASOPHILS NFR BLD AUTO: 1 % (ref 0–1.5)
BENZODIAZ UR QL SCN: NEGATIVE
BILIRUB SERPL-MCNC: 0.2 MG/DL (ref 0.2–1.2)
BUN BLD-MCNC: 21 MG/DL (ref 6–20)
BUN/CREAT SERPL: 23.6 (ref 7–25)
CALCIUM SPEC-SCNC: 8.6 MG/DL (ref 8.6–10.5)
CANNABINOIDS SERPL QL: NEGATIVE
CHLORIDE SERPL-SCNC: 109 MMOL/L (ref 98–107)
CO2 SERPL-SCNC: 26.7 MMOL/L (ref 22–29)
COCAINE UR QL: NEGATIVE
CREAT BLD-MCNC: 0.89 MG/DL (ref 0.57–1)
DEPRECATED RDW RBC AUTO: 48.5 FL (ref 37–54)
EOSINOPHIL # BLD AUTO: 0.19 10*3/MM3 (ref 0–0.4)
EOSINOPHIL NFR BLD AUTO: 2.8 % (ref 0.3–6.2)
ERYTHROCYTE [DISTWIDTH] IN BLOOD BY AUTOMATED COUNT: 13.3 % (ref 12.3–15.4)
GFR SERPL CREATININE-BSD FRML MDRD: 66 ML/MIN/1.73
GLOBULIN UR ELPH-MCNC: 2.1 GM/DL
GLUCOSE BLD-MCNC: 101 MG/DL (ref 65–99)
HCT VFR BLD AUTO: 42.2 % (ref 34–46.6)
HGB BLD-MCNC: 13.1 G/DL (ref 12–15.9)
IMM GRANULOCYTES # BLD AUTO: 0.02 10*3/MM3 (ref 0–0.05)
IMM GRANULOCYTES NFR BLD AUTO: 0.3 % (ref 0–0.5)
LYMPHOCYTES # BLD AUTO: 1.86 10*3/MM3 (ref 0.7–3.1)
LYMPHOCYTES NFR BLD AUTO: 27.9 % (ref 19.6–45.3)
MCH RBC QN AUTO: 30.1 PG (ref 26.6–33)
MCHC RBC AUTO-ENTMCNC: 31 G/DL (ref 31.5–35.7)
MCV RBC AUTO: 97 FL (ref 79–97)
METHADONE UR QL SCN: NEGATIVE
MONOCYTES # BLD AUTO: 0.57 10*3/MM3 (ref 0.1–0.9)
MONOCYTES NFR BLD AUTO: 8.5 % (ref 5–12)
NEUTROPHILS # BLD AUTO: 3.96 10*3/MM3 (ref 1.7–7)
NEUTROPHILS NFR BLD AUTO: 59.5 % (ref 42.7–76)
NRBC BLD AUTO-RTO: 0 /100 WBC (ref 0–0.2)
OPIATES UR QL: NEGATIVE
OXYCODONE UR QL SCN: NEGATIVE
PLATELET # BLD AUTO: 277 10*3/MM3 (ref 140–450)
PMV BLD AUTO: 11.4 FL (ref 6–12)
POTASSIUM BLD-SCNC: 4.6 MMOL/L (ref 3.5–5.2)
PROT SERPL-MCNC: 6.4 G/DL (ref 6–8.5)
RBC # BLD AUTO: 4.35 10*6/MM3 (ref 3.77–5.28)
SODIUM BLD-SCNC: 143 MMOL/L (ref 136–145)
WBC NRBC COR # BLD: 6.67 10*3/MM3 (ref 3.4–10.8)

## 2019-08-25 PROCEDURE — 80307 DRUG TEST PRSMV CHEM ANLYZR: CPT | Performed by: NURSE PRACTITIONER

## 2019-08-25 PROCEDURE — 94799 UNLISTED PULMONARY SVC/PX: CPT

## 2019-08-25 PROCEDURE — 80053 COMPREHEN METABOLIC PANEL: CPT | Performed by: PHYSICIAN ASSISTANT

## 2019-08-25 PROCEDURE — 85025 COMPLETE CBC W/AUTO DIFF WBC: CPT | Performed by: PHYSICIAN ASSISTANT

## 2019-08-25 PROCEDURE — 94640 AIRWAY INHALATION TREATMENT: CPT

## 2019-08-25 RX ORDER — IPRATROPIUM BROMIDE AND ALBUTEROL SULFATE 2.5; .5 MG/3ML; MG/3ML
3 SOLUTION RESPIRATORY (INHALATION) ONCE
Status: COMPLETED | OUTPATIENT
Start: 2019-08-25 | End: 2019-08-25

## 2019-08-25 RX ORDER — ACETAMINOPHEN 500 MG
1000 TABLET ORAL ONCE
Status: COMPLETED | OUTPATIENT
Start: 2019-08-25 | End: 2019-08-25

## 2019-08-25 RX ORDER — PREGABALIN 100 MG/1
200 CAPSULE ORAL ONCE
Status: COMPLETED | OUTPATIENT
Start: 2019-08-25 | End: 2019-08-25

## 2019-08-25 RX ORDER — NICOTINE 21 MG/24HR
1 PATCH, TRANSDERMAL 24 HOURS TRANSDERMAL
Status: DISCONTINUED | OUTPATIENT
Start: 2019-08-25 | End: 2019-08-26 | Stop reason: HOSPADM

## 2019-08-25 RX ADMIN — IPRATROPIUM BROMIDE AND ALBUTEROL SULFATE 3 ML: 2.5; .5 SOLUTION RESPIRATORY (INHALATION) at 23:51

## 2019-08-25 RX ADMIN — ACETAMINOPHEN 1000 MG: 500 TABLET, FILM COATED ORAL at 08:31

## 2019-08-25 RX ADMIN — PREGABALIN 200 MG: 100 CAPSULE ORAL at 13:41

## 2019-08-25 RX ADMIN — IPRATROPIUM BROMIDE AND ALBUTEROL SULFATE 3 ML: 2.5; .5 SOLUTION RESPIRATORY (INHALATION) at 08:42

## 2019-08-25 RX ADMIN — IPRATROPIUM BROMIDE AND ALBUTEROL SULFATE 3 ML: 2.5; .5 SOLUTION RESPIRATORY (INHALATION) at 16:24

## 2019-08-25 RX ADMIN — NICOTINE 1 PATCH: 14 PATCH, EXTENDED RELEASE TRANSDERMAL at 19:35

## 2019-08-25 RX ADMIN — ALBUTEROL SULFATE 2.5 MG: 2.5 SOLUTION RESPIRATORY (INHALATION) at 00:14

## 2019-08-26 VITALS
BODY MASS INDEX: 36.18 KG/M2 | HEIGHT: 62 IN | HEART RATE: 78 BPM | TEMPERATURE: 97.7 F | DIASTOLIC BLOOD PRESSURE: 96 MMHG | RESPIRATION RATE: 20 BRPM | WEIGHT: 196.6 LBS | OXYGEN SATURATION: 96 % | SYSTOLIC BLOOD PRESSURE: 146 MMHG

## 2019-09-04 DIAGNOSIS — K21.9 GASTROESOPHAGEAL REFLUX DISEASE, ESOPHAGITIS PRESENCE NOT SPECIFIED: ICD-10-CM

## 2019-09-04 DIAGNOSIS — J44.9 CHRONIC OBSTRUCTIVE PULMONARY DISEASE, UNSPECIFIED COPD TYPE (HCC): ICD-10-CM

## 2019-09-04 RX ORDER — RANITIDINE 300 MG/1
300 TABLET ORAL NIGHTLY
Qty: 30 TABLET | Refills: 5 | Status: SHIPPED | OUTPATIENT
Start: 2019-09-04 | End: 2019-10-02 | Stop reason: SINTOL

## 2019-09-04 RX ORDER — FLUTICASONE PROPIONATE 50 MCG
1 SPRAY, SUSPENSION (ML) NASAL DAILY
Qty: 16 G | Refills: 5 | Status: SHIPPED | OUTPATIENT
Start: 2019-09-04 | End: 2020-03-03 | Stop reason: SDUPTHER

## 2019-09-04 RX ORDER — POTASSIUM CHLORIDE 750 MG/1
10 TABLET, FILM COATED, EXTENDED RELEASE ORAL 2 TIMES DAILY
Qty: 60 TABLET | Refills: 5 | Status: SHIPPED | OUTPATIENT
Start: 2019-09-04 | End: 2020-03-03 | Stop reason: SDUPTHER

## 2019-09-05 ENCOUNTER — TELEPHONE (OUTPATIENT)
Dept: FAMILY MEDICINE CLINIC | Facility: CLINIC | Age: 54
End: 2019-09-05

## 2019-09-05 ENCOUNTER — OFFICE VISIT (OUTPATIENT)
Dept: FAMILY MEDICINE CLINIC | Facility: CLINIC | Age: 54
End: 2019-09-05

## 2019-09-05 VITALS
DIASTOLIC BLOOD PRESSURE: 82 MMHG | RESPIRATION RATE: 16 BRPM | HEIGHT: 62 IN | TEMPERATURE: 98 F | HEART RATE: 92 BPM | BODY MASS INDEX: 35.7 KG/M2 | SYSTOLIC BLOOD PRESSURE: 122 MMHG | WEIGHT: 194 LBS | OXYGEN SATURATION: 94 %

## 2019-09-05 DIAGNOSIS — F31.12 BIPOLAR AFFECTIVE DISORDER, CURRENTLY MANIC, MODERATE (HCC): ICD-10-CM

## 2019-09-05 DIAGNOSIS — F31.12 BIPOLAR AFFECTIVE DISORDER, CURRENTLY MANIC, MODERATE (HCC): Primary | ICD-10-CM

## 2019-09-05 PROCEDURE — 99215 OFFICE O/P EST HI 40 MIN: CPT | Performed by: FAMILY MEDICINE

## 2019-09-05 RX ORDER — LAMOTRIGINE 25 MG/1
TABLET ORAL
Qty: 45 TABLET | Refills: 0 | Status: SHIPPED | OUTPATIENT
Start: 2019-09-05 | End: 2019-10-06 | Stop reason: SDUPTHER

## 2019-09-05 RX ORDER — CARBAMAZEPINE 200 MG/1
200 TABLET ORAL 2 TIMES DAILY
Qty: 60 TABLET | Refills: 0 | Status: SHIPPED | OUTPATIENT
Start: 2019-09-05 | End: 2019-09-05

## 2019-09-05 RX ORDER — CARBAMAZEPINE 200 MG/1
200 TABLET ORAL 2 TIMES DAILY
Qty: 60 TABLET | Refills: 0 | Status: ON HOLD | OUTPATIENT
Start: 2019-09-05 | End: 2019-10-03 | Stop reason: SDDI

## 2019-09-05 RX ORDER — LAMOTRIGINE 25 MG/1
TABLET ORAL
Qty: 45 TABLET | Refills: 0 | Status: SHIPPED | OUTPATIENT
Start: 2019-09-05 | End: 2019-09-05

## 2019-09-05 NOTE — TELEPHONE ENCOUNTER
----- Message from Faraz Phillip MD sent at 9/5/2019  2:33 PM EDT -----  Regarding: antibiotics  The discharge summary from [facility] states that you were discharged on levofloxacin and also noticed Levaquin for 5 days of oral antibiotics.  When you are discharged to another facility lots of times you are not given medication, instead the facility who takes you back will be giving you those medications.  That may be why you did not noticed you were given medications from the hospital to go back to [facility].  I could check the medication reconciliation at [facility] but I doubt they will allow me to have that information as it is a [facility] facility.  I would say if you are feeling better or at least steadily improving I would not worry so much about it.  Your lungs sounded pretty good today.    ----- Message -----  From: Korina Domingo  Sent: 9/5/2019  11:32 AM  To: Faraz Phillip MD

## 2019-09-05 NOTE — PATIENT INSTRUCTIONS
Bipolar 1 Disorder  Bipolar 1 disorder is a mental health disorder in which a person has episodes of emotional highs (ben), and may also have episodes of emotional lows (depression) in addition to highs. Bipolar 1 disorder is different from other bipolar disorders because it involves extreme manic episodes. These episodes last at least one week or involve symptoms that are so severe that hospitalization is needed to keep the person safe.  What increases the risk?  The cause of this condition is not known. However, certain factors make you more likely to have bipolar disorder, such as:  · Having a family member with the disorder.  · An imbalance of certain chemicals in the brain (neurotransmitters).  · Stress, such as illness, financial problems, or a death.  · Certain conditions that affect the brain or spinal cord (neurologic conditions).  · Brain injury (trauma).  · Having another mental health disorder, such as:  ? Obsessive compulsive disorder.  ? Schizophrenia.  What are the signs or symptoms?  Symptoms of ben include:  · Very high self-esteem or self-confidence.  · Decreased need for sleep.  · Unusual talkativeness or feeling a need to keep talking. Speech may be very fast. It may seem like you cannot stop talking.  · Racing thoughts or constant talking, with quick shifts between topics that may or may not be related (flight of ideas).  · Decreased ability to focus or concentrate.  · Increased purposeful activity, such as work, studies, or social activity.  · Increased nonproductive activity. This could be pacing, squirming and fidgeting, or finger and toe tapping.  · Impulsive behavior and poor judgment. This may result in high-risk activities, such as having unprotected sex or spending a lot of money.  Symptoms of depression include:  · Feeling sad, hopeless, or helpless.  · Frequent or uncontrollable crying.  · Lack of feeling or caring about anything.  · Sleeping too much.  · Moving more slowly than  usual.  · Not being able to enjoy things you used to enjoy.  · Wanting to be alone all the time.  · Feeling guilty or worthless.  · Lack of energy or motivation.  · Trouble concentrating or remembering.  · Trouble making decisions.  · Increased appetite.  · Thoughts of death, or the desire to harm yourself.  Sometimes, you may have a mixed mood. This means having symptoms of depression and ben. Stress can make symptoms worse.  How is this diagnosed?  To diagnose bipolar disorder, your health care provider may ask about your:  · Emotional episodes.  · Medical history.  · Alcohol and drug use. This includes prescription medicines. Certain medical conditions and substances can cause symptoms that seem like bipolar disorder (secondary bipolar disorder).  How is this treated?  Bipolar disorder is a long-term (chronic) illness. It is best controlled with ongoing (continuous) treatment rather than treatment only when symptoms occur. Treatment may include:  · Medicine. Medicine can be prescribed by a provider who specializes in treating mental disorders (psychiatrist).  ? Medicines called mood stabilizers are usually prescribed.  ? If symptoms occur even while taking a mood stabilizer, other medicines may be added.  · Psychotherapy. Some forms of talk therapy, such as cognitive-behavioral therapy (CBT), can provide support, education, and guidance.  · Coping methods, such as journaling or relaxation exercises. These may include:  ? Yoga.  ? Meditation.  ? Deep breathing.  · Lifestyle changes, such as:  ? Limiting alcohol and drug use.  ? Exercising regularly.  ? Getting plenty of sleep.  ? Making healthy eating choices.    A combination of medicine, talk therapy, and coping methods is best. A procedure in which electricity is applied to the brain through the scalp (electroconvulsive therapy) may be used in cases of severe ben when medicine and psychotherapy work too slowly or do not work.  Follow these instructions at  home:  Activity    · Return to your normal activities as told by your health care provider.  · Find activities that you enjoy, and make time to do them.  · Exercise regularly as told by your health care provider.  Lifestyle  · Limit alcohol intake to no more than 1 drink a day for nonpregnant women and 2 drinks a day for men. One drink equals 12 oz of beer, 5 oz of wine, or 1½ oz of hard liquor.  · Follow a set schedule for eating and sleeping.  · Eat a balanced diet that includes fresh fruits and vegetables, whole grains, low-fat dairy, and lean meat.  · Get 7-8 hours of sleep each night.  General instructions  · Take over-the-counter and prescription medicines only as told by your health care provider.  · Think about joining a support group. Your health care provider may be able to recommend a support group.  · Talk with your family and loved ones about your treatment goals and how they can help.  · Keep all follow-up visits as told by your health care provider. This is important.  Where to find more information  For more information about bipolar disorder, visit the following websites:  · National [place] on Mental Illness: www.[place]gar.org  · [place] National [place] of Mental Health: www.nimh.nih.gov  Contact a health care provider if:  · Your symptoms get worse.  · You have side effects from your medicine, and they get worse.  · You have trouble sleeping.  · You have trouble doing daily activities.  · You feel unsafe in your surroundings.  · You are dealing with substance abuse.  Get help right away if:  · You have new symptoms.  · You have thoughts about harming yourself.  · You self-harm.  This information is not intended to replace advice given to you by your health care provider. Make sure you discuss any questions you have with your health care provider.  Document Released: 03/26/2002 Document Revised: 08/13/2017 Document Reviewed: 08/17/2017  Elsevier Interactive Patient Education © 2019 Elsevier Inc.

## 2019-09-09 ENCOUNTER — TELEPHONE (OUTPATIENT)
Dept: FAMILY MEDICINE CLINIC | Facility: CLINIC | Age: 54
End: 2019-09-09

## 2019-09-09 NOTE — TELEPHONE ENCOUNTER
Received a refill request from [facility] for aripiprazole 5 mg tabs- 1 tablet by mouth daily.    Okay to refill?

## 2019-10-02 ENCOUNTER — APPOINTMENT (OUTPATIENT)
Dept: GENERAL RADIOLOGY | Facility: HOSPITAL | Age: 54
End: 2019-10-02

## 2019-10-02 ENCOUNTER — TELEPHONE (OUTPATIENT)
Dept: FAMILY MEDICINE CLINIC | Facility: CLINIC | Age: 54
End: 2019-10-02

## 2019-10-02 ENCOUNTER — HOSPITAL ENCOUNTER (EMERGENCY)
Facility: HOSPITAL | Age: 54
End: 2019-10-03
Attending: EMERGENCY MEDICINE

## 2019-10-02 DIAGNOSIS — R45.851 SUICIDAL IDEATION: Primary | ICD-10-CM

## 2019-10-02 DIAGNOSIS — F31.2 BIPOLAR AFFECTIVE DISORDER, CURRENTLY MANIC, SEVERE, WITH PSYCHOTIC FEATURES (HCC): ICD-10-CM

## 2019-10-02 PROBLEM — F31.9 BIPOLAR 1 DISORDER (HCC): Status: ACTIVE | Noted: 2019-10-02

## 2019-10-02 LAB
ALBUMIN SERPL-MCNC: 4.4 G/DL (ref 3.5–5.2)
ALBUMIN/GLOB SERPL: 1.5 G/DL
ALP SERPL-CCNC: 81 U/L (ref 39–117)
ALT SERPL W P-5'-P-CCNC: 8 U/L (ref 1–33)
ANION GAP SERPL CALCULATED.3IONS-SCNC: 11.3 MMOL/L (ref 5–15)
AST SERPL-CCNC: 12 U/L (ref 1–32)
BASOPHILS # BLD AUTO: 0.08 10*3/MM3 (ref 0–0.2)
BASOPHILS NFR BLD AUTO: 0.9 % (ref 0–1.5)
BILIRUB SERPL-MCNC: 0.2 MG/DL (ref 0.2–1.2)
BUN BLD-MCNC: 11 MG/DL (ref 6–20)
BUN/CREAT SERPL: 12 (ref 7–25)
CALCIUM SPEC-SCNC: 8.8 MG/DL (ref 8.6–10.5)
CARBAMAZEPINE SERPL-MCNC: 2.5 MCG/ML (ref 4–12)
CHLORIDE SERPL-SCNC: 99 MMOL/L (ref 98–107)
CO2 SERPL-SCNC: 25.7 MMOL/L (ref 22–29)
CREAT BLD-MCNC: 0.92 MG/DL (ref 0.57–1)
DEPRECATED RDW RBC AUTO: 47.7 FL (ref 37–54)
EOSINOPHIL # BLD AUTO: 0.27 10*3/MM3 (ref 0–0.4)
EOSINOPHIL NFR BLD AUTO: 3 % (ref 0.3–6.2)
ERYTHROCYTE [DISTWIDTH] IN BLOOD BY AUTOMATED COUNT: 13.5 % (ref 12.3–15.4)
ETHANOL BLD-MCNC: <10 MG/DL (ref 0–10)
ETHANOL UR QL: <0.01 %
GFR SERPL CREATININE-BSD FRML MDRD: 63 ML/MIN/1.73
GLOBULIN UR ELPH-MCNC: 3 GM/DL
GLUCOSE BLD-MCNC: 94 MG/DL (ref 65–99)
HCT VFR BLD AUTO: 42.8 % (ref 34–46.6)
HGB BLD-MCNC: 13.3 G/DL (ref 12–15.9)
IMM GRANULOCYTES # BLD AUTO: 0.03 10*3/MM3 (ref 0–0.05)
IMM GRANULOCYTES NFR BLD AUTO: 0.3 % (ref 0–0.5)
LIPASE SERPL-CCNC: 17 U/L (ref 13–60)
LYMPHOCYTES # BLD AUTO: 2.32 10*3/MM3 (ref 0.7–3.1)
LYMPHOCYTES NFR BLD AUTO: 25.7 % (ref 19.6–45.3)
MCH RBC QN AUTO: 29.5 PG (ref 26.6–33)
MCHC RBC AUTO-ENTMCNC: 31.1 G/DL (ref 31.5–35.7)
MCV RBC AUTO: 94.9 FL (ref 79–97)
MONOCYTES # BLD AUTO: 0.72 10*3/MM3 (ref 0.1–0.9)
MONOCYTES NFR BLD AUTO: 8 % (ref 5–12)
NEUTROPHILS # BLD AUTO: 5.61 10*3/MM3 (ref 1.7–7)
NEUTROPHILS NFR BLD AUTO: 62.1 % (ref 42.7–76)
NRBC BLD AUTO-RTO: 0 /100 WBC (ref 0–0.2)
NT-PROBNP SERPL-MCNC: 441.2 PG/ML (ref 5–900)
PLATELET # BLD AUTO: 296 10*3/MM3 (ref 140–450)
PMV BLD AUTO: 10.3 FL (ref 6–12)
POTASSIUM BLD-SCNC: 4.1 MMOL/L (ref 3.5–5.2)
PROT SERPL-MCNC: 7.4 G/DL (ref 6–8.5)
RBC # BLD AUTO: 4.51 10*6/MM3 (ref 3.77–5.28)
SODIUM BLD-SCNC: 136 MMOL/L (ref 136–145)
TROPONIN T SERPL-MCNC: <0.01 NG/ML (ref 0–0.03)
WBC NRBC COR # BLD: 9.03 10*3/MM3 (ref 3.4–10.8)

## 2019-10-02 PROCEDURE — 83690 ASSAY OF LIPASE: CPT | Performed by: EMERGENCY MEDICINE

## 2019-10-02 PROCEDURE — 85025 COMPLETE CBC W/AUTO DIFF WBC: CPT | Performed by: EMERGENCY MEDICINE

## 2019-10-02 PROCEDURE — 80307 DRUG TEST PRSMV CHEM ANLYZR: CPT | Performed by: NURSE PRACTITIONER

## 2019-10-02 PROCEDURE — 90791 PSYCH DIAGNOSTIC EVALUATION: CPT

## 2019-10-02 PROCEDURE — 83880 ASSAY OF NATRIURETIC PEPTIDE: CPT | Performed by: EMERGENCY MEDICINE

## 2019-10-02 PROCEDURE — 71046 X-RAY EXAM CHEST 2 VIEWS: CPT

## 2019-10-02 PROCEDURE — 93010 ELECTROCARDIOGRAM REPORT: CPT | Performed by: INTERNAL MEDICINE

## 2019-10-02 PROCEDURE — 93005 ELECTROCARDIOGRAM TRACING: CPT | Performed by: EMERGENCY MEDICINE

## 2019-10-02 PROCEDURE — 84484 ASSAY OF TROPONIN QUANT: CPT | Performed by: EMERGENCY MEDICINE

## 2019-10-02 PROCEDURE — 80053 COMPREHEN METABOLIC PANEL: CPT | Performed by: EMERGENCY MEDICINE

## 2019-10-02 PROCEDURE — 80156 ASSAY CARBAMAZEPINE TOTAL: CPT

## 2019-10-02 PROCEDURE — 93005 ELECTROCARDIOGRAM TRACING: CPT

## 2019-10-02 RX ORDER — ACETAMINOPHEN 325 MG/1
650 TABLET ORAL EVERY 4 HOURS PRN
Status: DISCONTINUED | OUTPATIENT
Start: 2019-10-02 | End: 2019-10-05 | Stop reason: HOSPADM

## 2019-10-02 RX ORDER — OLANZAPINE 10 MG/1
10 TABLET, ORALLY DISINTEGRATING ORAL ONCE
Status: DISCONTINUED | OUTPATIENT
Start: 2019-10-02 | End: 2019-10-03 | Stop reason: HOSPADM

## 2019-10-02 RX ORDER — MELOXICAM 15 MG/1
15 TABLET ORAL DAILY
Status: DISCONTINUED | OUTPATIENT
Start: 2019-10-02 | End: 2019-10-03 | Stop reason: HOSPADM

## 2019-10-02 RX ORDER — ALBUTEROL SULFATE 2.5 MG/3ML
2.5 SOLUTION RESPIRATORY (INHALATION) ONCE
Status: DISCONTINUED | OUTPATIENT
Start: 2019-10-02 | End: 2019-10-02

## 2019-10-02 RX ORDER — SODIUM CHLORIDE 0.9 % (FLUSH) 0.9 %
10 SYRINGE (ML) INJECTION AS NEEDED
Status: DISCONTINUED | OUTPATIENT
Start: 2019-10-02 | End: 2019-10-03 | Stop reason: HOSPADM

## 2019-10-02 RX ORDER — ALUMINA, MAGNESIA, AND SIMETHICONE 2400; 2400; 240 MG/30ML; MG/30ML; MG/30ML
15 SUSPENSION ORAL EVERY 6 HOURS PRN
Status: DISCONTINUED | OUTPATIENT
Start: 2019-10-02 | End: 2019-10-05 | Stop reason: HOSPADM

## 2019-10-02 RX ORDER — LOPERAMIDE HYDROCHLORIDE 2 MG/1
2 CAPSULE ORAL
Status: DISCONTINUED | OUTPATIENT
Start: 2019-10-02 | End: 2019-10-05 | Stop reason: HOSPADM

## 2019-10-02 RX ORDER — IPRATROPIUM BROMIDE AND ALBUTEROL SULFATE 2.5; .5 MG/3ML; MG/3ML
3 SOLUTION RESPIRATORY (INHALATION)
Status: DISCONTINUED | OUTPATIENT
Start: 2019-10-02 | End: 2019-10-05 | Stop reason: HOSPADM

## 2019-10-02 RX ORDER — NICOTINE 21 MG/24HR
1 PATCH, TRANSDERMAL 24 HOURS TRANSDERMAL EVERY 24 HOURS
Status: DISCONTINUED | OUTPATIENT
Start: 2019-10-02 | End: 2019-10-05 | Stop reason: HOSPADM

## 2019-10-02 RX ADMIN — MELOXICAM 15 MG: 15 TABLET ORAL at 20:39
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
## 2019-10-02 NOTE — TELEPHONE ENCOUNTER
Pt's daughter informed.   She is in the hospital now. If she signs herself out, they will get the MIW on her.
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
## 2019-10-02 NOTE — TELEPHONE ENCOUNTER
Per Dr. Phillip note, he stated he would refill her medications until she can get in with [address] and that she would need to go to the ER if she had worsening moods again.  Please see where she is with appointment with them.  Also, family could consider mental inquest warrant for treatment.  If she is a danger to herself or others, they should consider taking this out ASAP to get her help.

## 2019-10-02 NOTE — ED NOTES
Pt is c/o pain in her shoulder and is requesting pain medication. NP notified.      Azul Perla RN  10/02/19 1954

## 2019-10-02 NOTE — ED NOTES
"Initially, pt denied SI and HI. Pt now tells RN that she is suicidal and that she would take a bunch of pills to kills herself. Pt states her boyfriend recently broke up with her and now she has no where to live. Pt states she took some of her sisters Dilaudid early today for her \"chronic shoulder and back pain\". Alert and oriented x 3. NAD.       Azul Perla RN  10/02/19 1916    "
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
## 2019-10-02 NOTE — TELEPHONE ENCOUNTER
She has never been that severe with her moods while in my care.  She needs to go to the hospital for an evaluation.  Ideally willingly but the family may need to take out an MIW on her.

## 2019-10-02 NOTE — ED TRIAGE NOTES
Patient denies SI at this time but reports she was this am. The police and EMS came to her house at 0230 and checked her out when she c/o SI. Patient reports taking 4mg of her sisters dilaudid.

## 2019-10-03 ENCOUNTER — HOSPITAL ENCOUNTER (INPATIENT)
Facility: HOSPITAL | Age: 54
LOS: 2 days | Discharge: HOME | End: 2019-10-05
Attending: SPECIALIST | Admitting: SPECIALIST

## 2019-10-03 ENCOUNTER — APPOINTMENT (OUTPATIENT)
Dept: GENERAL RADIOLOGY | Facility: HOSPITAL | Age: 54
End: 2019-10-03

## 2019-10-03 VITALS
HEART RATE: 99 BPM | OXYGEN SATURATION: 93 % | SYSTOLIC BLOOD PRESSURE: 152 MMHG | TEMPERATURE: 98.7 F | RESPIRATION RATE: 22 BRPM | DIASTOLIC BLOOD PRESSURE: 77 MMHG

## 2019-10-03 PROBLEM — B88.8 INFESTATION BY BED BUG: Status: ACTIVE | Noted: 2019-10-03

## 2019-10-03 PROBLEM — M25.511 ACUTE PAIN OF RIGHT SHOULDER: Status: ACTIVE | Noted: 2019-10-03

## 2019-10-03 LAB
THEOPHYLLINE SERPL-MCNC: <0.8 MCG/ML (ref 10–20)
TSH SERPL DL<=0.05 MIU/L-ACNC: 2.18 UIU/ML (ref 0.27–4.2)

## 2019-10-03 PROCEDURE — 84443 ASSAY THYROID STIM HORMONE: CPT | Performed by: SPECIALIST

## 2019-10-03 PROCEDURE — 94640 AIRWAY INHALATION TREATMENT: CPT

## 2019-10-03 PROCEDURE — 94799 UNLISTED PULMONARY SVC/PX: CPT

## 2019-10-03 PROCEDURE — 80198 ASSAY OF THEOPHYLLINE: CPT | Performed by: SPECIALIST

## 2019-10-03 PROCEDURE — 73030 X-RAY EXAM OF SHOULDER: CPT

## 2019-10-03 RX ORDER — LEVOTHYROXINE SODIUM 0.03 MG/1
25 TABLET ORAL
Status: DISCONTINUED | OUTPATIENT
Start: 2019-10-03 | End: 2019-10-05 | Stop reason: HOSPADM

## 2019-10-03 RX ORDER — FAMOTIDINE 20 MG/1
20 TABLET, FILM COATED ORAL DAILY
Status: DISCONTINUED | OUTPATIENT
Start: 2019-10-03 | End: 2019-10-05 | Stop reason: HOSPADM

## 2019-10-03 RX ORDER — CETIRIZINE HYDROCHLORIDE 10 MG/1
10 TABLET ORAL DAILY
Status: DISCONTINUED | OUTPATIENT
Start: 2019-10-03 | End: 2019-10-05 | Stop reason: HOSPADM

## 2019-10-03 RX ORDER — PERMETHRIN 50 MG/G
CREAM TOPICAL ONCE
Status: COMPLETED | OUTPATIENT
Start: 2019-10-03 | End: 2019-10-04

## 2019-10-03 RX ORDER — LAMOTRIGINE 25 MG/1
25 TABLET ORAL NIGHTLY
Status: DISCONTINUED | OUTPATIENT
Start: 2019-10-03 | End: 2019-10-05 | Stop reason: HOSPADM

## 2019-10-03 RX ORDER — IPRATROPIUM BROMIDE AND ALBUTEROL SULFATE 2.5; .5 MG/3ML; MG/3ML
3 SOLUTION RESPIRATORY (INHALATION) EVERY 4 HOURS PRN
Status: DISCONTINUED | OUTPATIENT
Start: 2019-10-03 | End: 2019-10-05 | Stop reason: HOSPADM

## 2019-10-03 RX ORDER — PREGABALIN 100 MG/1
200 CAPSULE ORAL 2 TIMES DAILY
Status: DISCONTINUED | OUTPATIENT
Start: 2019-10-03 | End: 2019-10-05 | Stop reason: HOSPADM

## 2019-10-03 RX ORDER — IPRATROPIUM BROMIDE AND ALBUTEROL SULFATE 2.5; .5 MG/3ML; MG/3ML
3 SOLUTION RESPIRATORY (INHALATION) ONCE
Status: COMPLETED | OUTPATIENT
Start: 2019-10-03 | End: 2019-10-03

## 2019-10-03 RX ORDER — HYDROXYZINE PAMOATE 25 MG/1
25 CAPSULE ORAL 3 TIMES DAILY PRN
Status: DISCONTINUED | OUTPATIENT
Start: 2019-10-03 | End: 2019-10-05 | Stop reason: HOSPADM

## 2019-10-03 RX ORDER — OLANZAPINE 7.5 MG/1
7.5 TABLET ORAL NIGHTLY
COMMUNITY
End: 2019-10-05 | Stop reason: HOSPADM

## 2019-10-03 RX ORDER — ESCITALOPRAM OXALATE 20 MG/1
20 TABLET ORAL DAILY
COMMUNITY
End: 2019-10-05 | Stop reason: HOSPADM

## 2019-10-03 RX ORDER — FLUTICASONE PROPIONATE 50 MCG
1 SPRAY, SUSPENSION (ML) NASAL DAILY
Status: DISCONTINUED | OUTPATIENT
Start: 2019-10-03 | End: 2019-10-05 | Stop reason: HOSPADM

## 2019-10-03 RX ORDER — RANITIDINE 300 MG/1
300 TABLET ORAL NIGHTLY
COMMUNITY
End: 2020-04-16 | Stop reason: SDUPTHER

## 2019-10-03 RX ORDER — HYDROXYZINE HYDROCHLORIDE 25 MG/1
25 TABLET, FILM COATED ORAL DAILY
Status: DISCONTINUED | OUTPATIENT
Start: 2019-10-03 | End: 2019-10-03

## 2019-10-03 RX ORDER — ERGOCALCIFEROL 1.25 MG/1
50000 CAPSULE ORAL WEEKLY
Status: DISCONTINUED | OUTPATIENT
Start: 2019-10-07 | End: 2019-10-05 | Stop reason: HOSPADM

## 2019-10-03 RX ORDER — PERMETHRIN 50 MG/G
CREAM TOPICAL ONCE
Status: DISCONTINUED | OUTPATIENT
Start: 2019-10-10 | End: 2019-10-05 | Stop reason: HOSPADM

## 2019-10-03 RX ORDER — ACETAMINOPHEN 325 MG/1
650 TABLET ORAL EVERY 6 HOURS PRN
Status: DISCONTINUED | OUTPATIENT
Start: 2019-10-03 | End: 2019-10-05 | Stop reason: HOSPADM

## 2019-10-03 RX ORDER — MELOXICAM 15 MG/1
15 TABLET ORAL DAILY
Status: DISCONTINUED | OUTPATIENT
Start: 2019-10-03 | End: 2019-10-05 | Stop reason: HOSPADM

## 2019-10-03 RX ORDER — BUSPIRONE HYDROCHLORIDE 15 MG/1
15 TABLET ORAL 2 TIMES DAILY
COMMUNITY
End: 2020-01-07 | Stop reason: SDUPTHER

## 2019-10-03 RX ORDER — LEVOTHYROXINE SODIUM 0.03 MG/1
25 TABLET ORAL DAILY
COMMUNITY
End: 2020-02-13 | Stop reason: SDUPTHER

## 2019-10-03 RX ORDER — LIDOCAINE 50 MG/G
1 PATCH TOPICAL
Status: DISCONTINUED | OUTPATIENT
Start: 2019-10-03 | End: 2019-10-05 | Stop reason: HOSPADM

## 2019-10-03 RX ORDER — LISINOPRIL AND HYDROCHLOROTHIAZIDE 25; 20 MG/1; MG/1
1 TABLET ORAL DAILY
COMMUNITY
End: 2019-11-07 | Stop reason: SDUPTHER

## 2019-10-03 RX ORDER — ERGOCALCIFEROL 1.25 MG/1
50000 CAPSULE ORAL WEEKLY
COMMUNITY
End: 2020-04-06 | Stop reason: SDUPTHER

## 2019-10-03 RX ORDER — BUDESONIDE AND FORMOTEROL FUMARATE DIHYDRATE 160; 4.5 UG/1; UG/1
2 AEROSOL RESPIRATORY (INHALATION)
Status: DISCONTINUED | OUTPATIENT
Start: 2019-10-03 | End: 2019-10-05 | Stop reason: HOSPADM

## 2019-10-03 RX ADMIN — PERMETHRIN: 50 CREAM TOPICAL at 13:39

## 2019-10-03 RX ADMIN — LAMOTRIGINE 25 MG: 25 TABLET ORAL at 20:30

## 2019-10-03 RX ADMIN — LEVOTHYROXINE SODIUM 25 MCG: 25 TABLET ORAL at 11:41

## 2019-10-03 RX ADMIN — LISINOPRIL: 20 TABLET ORAL at 15:46

## 2019-10-03 RX ADMIN — IPRATROPIUM BROMIDE AND ALBUTEROL SULFATE 3 ML: 2.5; .5 SOLUTION RESPIRATORY (INHALATION) at 12:58

## 2019-10-03 RX ADMIN — BUDESONIDE AND FORMOTEROL FUMARATE DIHYDRATE 2 PUFF: 160; 4.5 AEROSOL RESPIRATORY (INHALATION) at 15:10

## 2019-10-03 RX ADMIN — PREGABALIN 200 MG: 100 CAPSULE ORAL at 11:41

## 2019-10-03 RX ADMIN — FLUTICASONE PROPIONATE 1 SPRAY: 50 SPRAY, METERED NASAL at 11:42

## 2019-10-03 RX ADMIN — MELOXICAM 15 MG: 15 TABLET ORAL at 11:40

## 2019-10-03 RX ADMIN — PREGABALIN 200 MG: 100 CAPSULE ORAL at 20:30

## 2019-10-03 RX ADMIN — ACETAMINOPHEN 650 MG: 325 TABLET, FILM COATED ORAL at 08:12

## 2019-10-03 RX ADMIN — FAMOTIDINE 20 MG: 20 TABLET, FILM COATED ORAL at 11:41

## 2019-10-03 RX ADMIN — LIDOCAINE 1 PATCH: 50 PATCH TOPICAL at 15:47

## 2019-10-03 RX ADMIN — IPRATROPIUM BROMIDE AND ALBUTEROL SULFATE 3 ML: 2.5; .5 SOLUTION RESPIRATORY (INHALATION) at 05:12

## 2019-10-03 RX ADMIN — THEOPHYLLINE ANHYDROUS 400 MG: 200 CAPSULE, EXTENDED RELEASE ORAL at 15:46

## 2019-10-03 RX ADMIN — IPRATROPIUM BROMIDE AND ALBUTEROL SULFATE 3 ML: 2.5; .5 SOLUTION RESPIRATORY (INHALATION) at 19:30

## 2019-10-03 RX ADMIN — CETIRIZINE HYDROCHLORIDE 10 MG: 10 TABLET, FILM COATED ORAL at 11:41

## 2019-10-03 NOTE — PROGRESS NOTES
Continued Stay Note  [facility]     Patient Name: Va Pastor  MRN: [id number]  Today's Date: 10/3/2019    Admit Date: 10/3/2019    Discharge Plan     Row Name 10/03/19 1145       Plan    Plan  Pt will return home.  Pt will receive an ASAF consult while inpt.  SW will explore outpt providers for continuity of care.    Plan Comments  SW met w/pt to discuss tx & d/c planning.  Pt gave demographic info but SW did not understand due to mumbling.  Pt verified phone number & PCP, Dr. Faraz Phillip.  SW inquired about outpt mental health providers; pt did not have any but was open to seeing someone.  SW ended conversation due to pt mumbling.        Discharge Codes    No documentation.             NINO Chan

## 2019-10-03 NOTE — ED NOTES
"Sydney from [facility] called this RN at 0507 to report that breathing treatment will not be administered because pt \"has been using her own inhaler all night and she doesn't need a breathing treatment.\" This was communicated to PRIYA Diaz at 0510 by this RN via telephone. PRIYA banuelos to see pt.      Jesica Mcmillan RN  10/03/19 0518    "

## 2019-10-03 NOTE — PLAN OF CARE
Problem: Patient Care Overview  Goal: Plan of Care Review  Outcome: Ongoing (interventions implemented as appropriate)   10/03/19 0812 10/03/19 1612   OTHER   Outcome Summary --  Patient denied any anxiety, depression, SI, HI, or hallucinations. Voiced right shoulder pain. Reported relief from PRN Tylenol. X ray done today. Bed bug situation earlier in shift. See previous note. withdrwan to room throughout day. Poor eye contact. Cooperative with medications. not attending groups. continuing to monitor.    Coping/Psychosocial   Plan of Care Reviewed With --  patient   Plan of Care Review   Progress --  no change   Coping/Psychosocial   Patient Agreement with Plan of Care agrees --      Goal: Individualization and Mutuality  Outcome: Ongoing (interventions implemented as appropriate)    Goal: Discharge Needs Assessment  Outcome: Ongoing (interventions implemented as appropriate)    Goal: Interprofessional Rounds/Family Conf  Outcome: Ongoing (interventions implemented as appropriate)      Problem: Overarching Goals (Adult)  Goal: Adheres to Safety Considerations for Self and Others  Outcome: Ongoing (interventions implemented as appropriate)   10/03/19 1612   Overarching Goals (Adult)   Adheres to Safety Considerations for Self and Others making progress toward outcome     Intervention: Develop and Maintain Individualized Safety Plan   10/03/19 0812   C-SSRS (Screen-Recent) Past Month   Q1 Wished to be Dead (Past Month) yes   Q2 Suicidal Thoughts (Past Month) yes   Q3 Suicidal Thought Method  no   Q4 Suicidal Intent without Specific Plan no   Q5 Suicide Intent with Specific Plan no   Q6 Suicide Behavior (Lifetime) yes   Within the past 3 Months? yes   Level of Risk per Screen (!) high risk   Violence Risk   Feels Like Hurting Others no   Develop and Maintain Individualized Safety Plan   Safety Measures safety rounds completed;suicide assessment completed       Goal: Optimized Coping Skills in Response to Life  Stressors  Outcome: Ongoing (interventions implemented as appropriate)   10/03/19 1612   Overarching Goals (Adult)   Optimized Coping Skills in Response to Life Stressors making progress toward outcome     Intervention: Promote Effective Coping Strategies   10/03/19 0812   Coping/Psychosocial Interventions   Supportive Measures active listening utilized;relaxation techniques promoted;self-reflection promoted;self-care encouraged;self-responsibility promoted;verbalization of feelings encouraged       Goal: Develops/Participates in Therapeutic Dola to Support Successful Transition  Outcome: Ongoing (interventions implemented as appropriate)   10/03/19 1612   Overarching Goals (Adult)   Develops/Participates in Therapeutic Dola to Support Successful Transition making progress toward outcome     Intervention: Foster Therapeutic Dola   10/03/19 0812   Interventions   Trust Relationship/Rapport care explained;choices provided;emotional support provided;empathic listening provided;questions answered;questions encouraged;reassurance provided;thoughts/feelings acknowledged         Problem: Mood Impairment (Depressive Signs/Symptoms) (Adult)  Goal: Improved Mood Symptoms (Depressive Signs/Symptoms)  Outcome: Ongoing (interventions implemented as appropriate)   10/03/19 1612   Improved Mood Symptoms (Depressive Signs/Symptoms)   Improved Mood Symptoms Time Frame for Action Step (STG) 4 days   Improved Mood Symptoms Action Step (STG) Outcome making progress toward outcome       Problem: Feelings of Worthlessness, Hopelessness, Excessive Guilt (Depressive Signs/Symptoms) (Adult)  Goal: Enhanced Self-Esteem/Confidence (Depressive Signs/Symptoms)  Outcome: Ongoing (interventions implemented as appropriate)   10/03/19 1612   Enhanced Self-Esteem/Confidence (Depressive Signs/Symptoms)   Enhanced Self-Esteem/Confidence Time Frame for Action Step (STG) 4 days   Enhanced Self-Esteem/Confidence Action Step (STG) Outcome  making progress toward outcome       Problem: Suicidal Behavior (Adult)  Goal: Suicidal Behavior is Absent/Minimized/Managed  Outcome: Ongoing (interventions implemented as appropriate)   10/03/19 1612   Suicidal Behavior is Absent/Minimized/Managed   Suicidal Behavior Managed/Minimized Time Frame for Action Step (STG) 4 days   Suicidal Behavior Managed/Minimized Action Step (STG) Outcome making progress toward outcome       Problem: Pain, Chronic (Adult)  Goal: Identify Related Risk Factors and Signs and Symptoms  Outcome: Outcome(s) achieved Date Met: 10/03/19   10/03/19 1612   Pain, Chronic (Adult)   Related Risk Factors (Chronic Pain) pain control inadequate;psychosocial factor   Signs and Symptoms (Chronic Pain) fatigue/weakness;verbalization of pain/discomfort for a prolonged time period     Goal: Acceptable Pain/Comfort Level and Functional Ability  Outcome: Ongoing (interventions implemented as appropriate)   10/03/19 1612   Pain, Chronic (Adult)   Acceptable Pain/Comfort Level and Functional Ability making progress toward outcome       Problem: Impaired Control (Excessive Substance Use) (Adult)  Goal: Participates in Recovery Program (Excessive Substance Use)  Outcome: Ongoing (interventions implemented as appropriate)   10/03/19 1612   Participates in Recovery Program (Excessive Substance Use)   Participates in Recovery Program Time Frame for Action Step (STG) 4 days   Participates in Recovery Program Action Step (STG) Outcome making progress toward outcome       Problem: Social/Occupational/Functional Impairment (Excessive Substance Use) (Adult)  Goal: Improved Social/Occupational/Functional Skills (Excessive Substance Use)  Outcome: Ongoing (interventions implemented as appropriate)   10/03/19 1612   Improved Social/Occupational/Functional Skills (Excessive Substance Use)   Improved Social/Occupational/Functional Skills Time Frame for Action Step (STG) 4 days   Improved Social/Occupational/Functional  Skills Action Step (STG) Outcome making progress toward outcome       Problem: Safety Awareness Impairment (Excessive Substance Use) (Adult)  Goal: Enhanced Safety Awareness (Excessive Substance Use)  Outcome: Ongoing (interventions implemented as appropriate)   10/03/19 1612   Enhanced Safety Awareness (Excessive Substance Use)   Enhanced Safety Awareness Time Frame for Action Step (STG) 4 days   Enhanced Safety Awareness Action Step (STG) Outcome making progress toward outcome       Problem: Physiological Impairment (Excessive Substance Use) (Adult)  Goal: Improved Physiologic Symptoms (Excessive Substance Use)  Outcome: Ongoing (interventions implemented as appropriate)   10/03/19 1612   Improved Physiologic Symptoms (Excessive Substance Use)   Improved Physiologic Symptoms Time Frame for Action Step (STG) 4 days   Improved Physiologic Symptoms Action Step (STG) Outcome making progress toward outcome

## 2019-10-03 NOTE — NURSING NOTE
Patient voiced to nurse practitoner  she had stayed with her mom who has bed bugs. Small sores noted throughout body. Infection control notified. All patient belongings collected and double bagged. All linen collected and double bagged. Patient provided with soap for shower and provided with hospital gown, underwear, socks and scrub pants. Patient moved to room 3329 after showering and dressing. environmental notified about previous room.

## 2019-10-03 NOTE — PLAN OF CARE
Problem: Patient Care Overview  Goal: Individualization and Mutuality  Outcome: Ongoing (interventions implemented as appropriate)    Goal: Interprofessional Rounds/Family Conf  Outcome: Ongoing (interventions implemented as appropriate)   10/03/19 1021   Interdisciplinary Rounds/Family Conf   Summary Zack team met to discuss pt's plan of care. Pt will receive an ASAF consult while inpt. SW will explore outpt providers. Pt's progress & svcs needed will be assessed ongoing.   Interdisciplinary Rounds/Family Conf   Participants art therapy;;nursing;psychiatrist;pharmacy;social work     Patient/Guardian Signature: __________________________________            Psychiatrist Signature: ______________________________________             Therapist Signature: ________________________________________         Nurse Signature: ___________________________________________          Staff Signature: ____________________________________________            Staff Signature: ____________________________________________          Staff Signature: ____________________________________________          Staff Signature:                                                                                                      Problem: Mood Impairment (Depressive Signs/Symptoms) (Adult)  Goal: Improved Mood Symptoms (Depressive Signs/Symptoms)  Outcome: Ongoing (interventions implemented as appropriate)      Problem: Feelings of Worthlessness, Hopelessness, Excessive Guilt (Depressive Signs/Symptoms) (Adult)  Goal: Enhanced Self-Esteem/Confidence (Depressive Signs/Symptoms)  Outcome: Ongoing (interventions implemented as appropriate)      Problem: Suicidal Behavior (Adult)  Goal: Suicidal Behavior is Absent/Minimized/Managed  Outcome: Ongoing (interventions implemented as appropriate)      Problem: Impaired Control (Excessive Substance Use) (Adult)  Goal: Participates in Recovery Program (Excessive Substance Use)  Outcome: Ongoing  (interventions implemented as appropriate)      Problem: Social/Occupational/Functional Impairment (Excessive Substance Use) (Adult)  Goal: Improved Social/Occupational/Functional Skills (Excessive Substance Use)  Outcome: Ongoing (interventions implemented as appropriate)      Problem: Safety Awareness Impairment (Excessive Substance Use) (Adult)  Goal: Enhanced Safety Awareness (Excessive Substance Use)  Outcome: Ongoing (interventions implemented as appropriate)      Problem: Physiological Impairment (Excessive Substance Use) (Adult)  Goal: Improved Physiologic Symptoms (Excessive Substance Use)  Outcome: Ongoing (interventions implemented as appropriate)

## 2019-10-03 NOTE — H&P
"IDENTIFYING INFORMATION: The patient is a 54-year-old white female admitted after having been brought to this facility by EMS    CHIEF COMPLAINT: None given    INFORMANT: Patient and chart    RELIABILITY: Limited    HISTORY OF PRESENT ILLNESS: The patient is a 54-year-old white female admitted after family had summoned an ambulance to her home last evening.  The patient had reportedly taken some of her sister's Dilaudid secondary to complaints of shoulder pain.  The patient reports that her family \"acts afraid of her\" and forced her to come here.  She remains equivocal on the reason for admission during today's interview.  She does report suicidal ideation with plan to overdose but when queried regarding suicidal ideation her answer is \"probably\".  Patient was last hospitalized at [facility] in August of this year.  She has a history of multiple previous psychiatric hospitalizations and suicide attempts per her report.  She is not currently followed by psychiatrist and reports that her primary care physician is prescribing Lamictal and Tegretol for her.  She is clearly not been taking Tegretol as her level on admission was less than 2.5.  The patient was noted to be irritable and angry in the emergency room.  She was noted to be filming with her cell phone in the emergency room and then became very angry when told that this was in violation of hospital rules.  She has also made a point of letting staff members know that she is \"good friends with\" a local celebrity who successfully sued this facility.  Patient denies recent changes in sleep or appetite and she denies abuse of psychoactive substances and her drug screen is negative.  The patient is not employed stating that she receives disability for her \"nerves and back\".  She also has a history of severe COPD continues to smoke cigarettes.  She reports a history of picking.    PAST PSYCHIATRIC HISTORY: As above    PAST MEDICAL HISTORY: Significant for " obesity, COPD and chronic back pain    MEDICATIONS:   Medications Prior to Admission   Medication Sig Dispense Refill Last Dose   • ALBUTEROL SULFATE  (90 Base) MCG/ACT inhaler INHALE 2 PUFFS BY MOUTH EVERY FOUR HOURS AS NEEDED FOR WHEEZING. 18 g 0 Taking   • alendronate (FOSAMAX) 70 MG tablet Take 1 tablet by mouth Every 7 (Seven) Days. 4 tablet 12 Taking   • BREO ELLIPTA 200-25 MCG/INH inhaler INHALE 1 PUFF BY MOUTH DAILY. 1 inhaler 6 Taking   • busPIRone (BUSPAR) 15 MG tablet Take 15 mg by mouth 2 (Two) Times a Day. For anxiety      • carBAMazepine (TEGRETOL) 200 MG tablet Take 1 tablet by mouth 2 (Two) Times a Day. 60 tablet 0    • cetirizine (zyrTEC) 10 MG tablet Take 1 tablet by mouth Daily. For allergies 90 tablet 3 Taking   • escitalopram (LEXAPRO) 20 MG tablet Take 20 mg by mouth Daily.      • fluticasone ([employer] FLUTICASONE PROPIONATE) 50 MCG/ACT nasal spray 1 spray into the nostril(s) as directed by provider Daily. 16 g 5 Taking   • hydrOXYzine (ATARAX) 25 MG tablet Take 1 tablet by mouth Daily. 30 tablet 6 Taking   • ipratropium (ATROVENT) 0.02 % nebulizer solution Take 2.5 mL by nebulization 4 (Four) Times a Day. 320 mL 5 Taking   • ipratropium-albuterol (DUO-NEB) 0.5-2.5 mg/3 ml nebulizer USE 1 VIAL PER NEBULIZER FOUR TIMES A DAY. 180 mL 0 Taking   • lamoTRIgine (LAMICTAL) 25 MG tablet Take 1 tablet once daily for two weeks and then increase to two tablets daily. 45 tablet 0    • levothyroxine (LEVOTHROID) 25 MCG tablet Take 1 tablet by mouth Daily. 90 tablet 3 Taking   • lisinopril-hydrochlorothiazide (PRINZIDE,ZESTORETIC) 20-25 MG per tablet Take 1 tablet by mouth Daily.      • LYRICA 200 MG capsule TAKE 1 CAPSULE BY MOUTH TWO TIMES A DAY. 60 capsule 2 Taking   • meloxicam (MOBIC) 15 MG tablet Take 1 tablet by mouth Daily. 30 tablet 3 Taking   • OLANZapine (zyPREXA) 7.5 MG tablet Take 7.5 mg by mouth Every Night.      • potassium chloride (K-DUR) 10 MEQ CR tablet Take 1 tablet by mouth 2 (Two)  "Times a Day. 60 tablet 5 Taking   • raNITIdine (ZANTAC) 300 MG tablet Take 300 mg by mouth Every Night.      • rOPINIRole (REQUIP) 0.25 MG tablet Take 1-2 tablets at bedtime.  Take 1 hour before bedtime. 60 tablet 3 Taking   • theophylline (AURORA-24) 400 MG 24 hr capsule Take 1 capsule by mouth Daily. 90 capsule 3 Taking   • vitamin D (ERGOCALCIFEROL) 62362 units capsule capsule Take 50,000 Units by mouth 1 (One) Time Per Week. For 20 doses as directed      • acetaminophen (TYLENOL) 650 MG 8 hr tablet Take 1 tablet by mouth Every 8 (Eight) Hours As Needed for Mild Pain . 90 tablet 0 Taking         ALLERGIES: Sertraline diclofenac    FAMILY HISTORY: None reported    SOCIAL HISTORY: The patient has been living with family members and is essentially homeless following a break-up with boyfriend 6 to 7 weeks ago.  She denies abuse of psychoactive substances in spite of her recent abuse of illicitly obtained Dilaudid.  The patient has a 9 grade education.  She is estranged from her 3 children.    MENTAL STATUS EXAM: The patient is obese disheveled white female appearing her stated age.  Of note are multiple areas of excoriation on the patient's upper extremities.  She is edentulous and does not have her dentures making her speech somewhat difficult to understand.  She is awake alert and oriented all spheres.  Her mood is somewhat irritable her affect congruent.  Speech is generally relevant coherent.  There are no deficits memory cognition noted.  Intelligence is judged to be in the average range based on fund of knowledge, the patient is less than optimally cooperative throughout the interview.  She continues to endorse positive suicidal ideation, when queried, she states \"probably\" in response to the question of whether she is having suicidal thoughts.  Denies homicidal ideation or psychotic symptoms.  Judgment and insight appear to be reasonably intact.  No signs of substance withdrawal are noted.    ASSETS/LIABILITIES: " To be assessed/homelessness    DIAGNOSTIC IMPRESSION: Bipolar disorder depressed phase by history, opioid use disorder, borderline personality traits versus disorder, COPD, history of chronic back pain    PLAN: The patient remains hospitalized for safety and stabilization.  Given her behavior in the emergency room and episodes of agitation, I feel as though it is imperative to leave a sitter with the patient for at least the first 24 hours of her hospitalization.  As the patient is clearly not been compliant with carbamazepine, this medication will be discontinued.  We will continue Lamictal but begin at a starting dose of 25 mg daily.  I will seek old records from [facility] prior to initiation of any other pharmacotherapeutic intervention.  Estimated length of stay in the hospital is 5 to 7 days.

## 2019-10-03 NOTE — ED NOTES
"RT paged as instructed per PRIYA Diaz, who states \"I want her to have a breathing treatment, she's wheezy.\"      Jesica Mcmillan RN  10/03/19 8172    "

## 2019-10-03 NOTE — ED NOTES
"Pt called out to nurses station stating \"I can't breathe, I need a breathing treatment.\" Pt appears to be in NAD however has been refusing to keep any monitoring equipment on for several hours. This RN informed pt that she needs to keep the monitoring equipment in place, specifically the pulse oximeter probe, if she is feeling SOA so that we may monitor her status. Pt stated \"fine, do what you have to do.\" This RN replaced SPO2 finger probe and BP cuff. Vitals assessed; vitals stable. SPO2 92% on room air, RR even and unlabored, breath sounds clear. Pt sitting in upright position with legs crossed in center of stretcher, rocking herself back and forth at this time. Call light within reach.  at bedside. Will continue to monitor.      Jesica Mcmillan RN  10/03/19 0427    "

## 2019-10-03 NOTE — NURSING NOTE
Received orders from Dr. Kenney to admit to [facility] with a sitter for high risk SP. Spoke with  re need for sitter; no sitter available currently, will hold in ED. Spoke with Dr. Tracie justin plan of care.

## 2019-10-03 NOTE — CONSULTS
"53 yo white female evaluated in ED (Room#18) BIB EMS from her family's house where she has been staying. Patient reports taking 4mg of her sister's dilaudid for her right shoulder pain but states this is not why they called the police. States \"my family acts afraid of me and forced me to come here\". Patient states her brother makes and uses meth and \"they all do drugs\". States she told them she was looking for another place to stay with their mom and that's what angered them. States they use her mother for her \"check\" to buy drugs. Patient noted to have sores on bilateral arms and legs; states \"i'm a \". Denies meth use. Denies drug use other than prescribed medication. 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 States she is diagnosed with bipolar disorder also. Prescribed lamictal and tegretol by her \"family doctor\". States she was on zyprexa but it \"made me short of breath and weak\". Patient has had pneumonia recently. 1.5 PPD smoker. Diagnosis include asthma, COPD, arthritis and neuropathy. Denies hypertension and diabetes. Wears glasses and has upper and lower dentures but does not have them with her. Patient had a recent breakup with boyfriend 6-7 weeks ago and \"has been a rolling stone since then\". States she has stayed with friends and different family members. Voices SI with thoughts of overdosing intermittently. States \"I don't have anything to live for\".     Patient is  with 3 children but states \"I don't have any children; I despise them\". Disabled, 9th grade education. Inpatient at [facility] august 2019. Long history of depression and bipolar disorder with psychiatric admissions dating back to 1979. History of multiple intentional overdoses per patient.     Mood labile in the ED. Tearful at times. Agitated and yelling at times. Focused on pain in right shoulder and demanding medication. Patient is homeless. Hopeless, helpless. Repeats \"I don't have anything to live for\". Will call " Dr. Kenney.

## 2019-10-03 NOTE — PLAN OF CARE
Problem: Patient Care Overview  Goal: Discharge Needs Assessment  Outcome: Ongoing (interventions implemented as appropriate)   10/03/19 1143 10/03/19 1147   Discharge Needs Assessment   Concerns to be Addressed mental health;coping/stress;decision making;substance/tobacco abuse/use;discharge planning --    Patient/Family Anticipates Transition to home --    Patient/Family Anticipated Services at [facility] services --    Discharge Coordination/Progress --  Pt will return home. Pt will receive an ASAF consult while inpt. SW will explore outpt providers for continuity of care.

## 2019-10-03 NOTE — PROGRESS NOTES
Continued Stay Note  [facility]     Patient Name: Va Pastor  MRN: [id number]  Today's Date: 10/3/2019    Admit Date: 10/3/2019    Discharge Plan     Row Name 10/03/19 1509       Plan    Plan Comments  SW rec'd a call from Joana PLASCENCIA,  for [employer] stating that she had spoken with the pt a few nights ago and was aware of her situation.  Joana stated that pt could give her a call and she would try to assist her w/housing but it would most likely be a shelter.  Her phone number is [phone].        Discharge Codes    No documentation.             NINO Chan

## 2019-10-03 NOTE — PROGRESS NOTES
Clinical Pharmacy Services: Medication History    Va Pastor is a 54 y.o. female presenting to [facility] for Bipolar 1 disorder (CMS/HCC) [F31.9]    She  has a past medical history of Anxiety, Arthritis, Asthma, Back pain, Bipolar affective disorder (CMS/[facility]), COPD (chronic obstructive pulmonary disease) (CMS/[facility]), Depression, Emphysema of lung (CMS/[facility]), GERD (gastroesophageal reflux disease), H/O emphysema, Hyperlipidemia, Hypertension, Osteoporosis, Sleep apnea, Sleep apnea, and Thyroid disease.    Allergies as of 10/02/2019 - Reviewed 10/02/2019   Allergen Reaction Noted   • Sertraline Other (See Comments) 09/05/2019   • Diclofenac  03/06/2017       Medication information was obtained from: [facility] in [place]  Pharmacy and Phone Number: [phone]    Prior to Admission Medications       Prescriptions Last Dose Informant Patient Reported? Taking?    ALBUTEROL SULFATE  (90 Base) MCG/ACT inhaler  Pharmacy No Yes    INHALE 2 PUFFS BY MOUTH EVERY FOUR HOURS AS NEEDED FOR WHEEZING.    alendronate (FOSAMAX) 70 MG tablet  Pharmacy No Yes    Take 1 tablet by mouth Every 7 (Seven) Days.    BREO ELLIPTA 200-25 MCG/INH inhaler  Pharmacy No Yes    INHALE 1 PUFF BY MOUTH DAILY.    busPIRone (BUSPAR) 15 MG tablet  Pharmacy Yes Yes    Take 15 mg by mouth 2 (Two) Times a Day. For anxiety    carBAMazepine (TEGRETOL) 200 MG tablet  Pharmacy No Yes    Take 1 tablet by mouth 2 (Two) Times a Day.    cetirizine (zyrTEC) 10 MG tablet  Pharmacy No Yes    Take 1 tablet by mouth Daily. For allergies    escitalopram (LEXAPRO) 20 MG tablet  Pharmacy Yes Yes    Take 20 mg by mouth Daily.    fluticasone ([employer] FLUTICASONE PROPIONATE) 50 MCG/ACT nasal spray  Pharmacy No Yes    1 spray into the nostril(s) as directed by provider Daily.    hydrOXYzine (ATARAX) 25 MG tablet  Pharmacy No Yes    Take 1 tablet by mouth Daily.    ipratropium (ATROVENT) 0.02 % nebulizer solution  Pharmacy No Yes    Take 2.5 mL  by nebulization 4 (Four) Times a Day.    ipratropium-albuterol (DUO-NEB) 0.5-2.5 mg/3 ml nebulizer  Pharmacy No Yes    USE 1 VIAL PER NEBULIZER FOUR TIMES A DAY.    lamoTRIgine (LAMICTAL) 25 MG tablet  Pharmacy No Yes    Take 1 tablet once daily for two weeks and then increase to two tablets daily.    levothyroxine (LEVOTHROID) 25 MCG tablet  Provider's Office No Yes    Take 1 tablet by mouth Daily.    lisinopril-hydrochlorothiazide (PRINZIDE,ZESTORETIC) 20-25 MG per tablet  Pharmacy Yes Yes    Take 1 tablet by mouth Daily.    LYRICA 200 MG capsule  Pharmacy No Yes    TAKE 1 CAPSULE BY MOUTH TWO TIMES A DAY.    meloxicam (MOBIC) 15 MG tablet  Pharmacy No Yes    Take 1 tablet by mouth Daily.    OLANZapine (zyPREXA) 7.5 MG tablet  Pharmacy Yes Yes    Take 7.5 mg by mouth Every Night.    potassium chloride (K-DUR) 10 MEQ CR tablet  Pharmacy No Yes    Take 1 tablet by mouth 2 (Two) Times a Day.    raNITIdine (ZANTAC) 300 MG tablet  Pharmacy Yes Yes    Take 300 mg by mouth Every Night.    rOPINIRole (REQUIP) 0.25 MG tablet  Pharmacy No Yes    Take 1-2 tablets at bedtime.  Take 1 hour before bedtime.    theophylline (AURORA-24) 400 MG 24 hr capsule  Pharmacy No Yes    Take 1 capsule by mouth Daily.    vitamin D (ERGOCALCIFEROL) 42695 units capsule capsule  Pharmacy Yes Yes    Take 50,000 Units by mouth 1 (One) Time Per Week. For 20 doses as directed    acetaminophen (TYLENOL) 650 MG 8 hr tablet   No No    Take 1 tablet by mouth Every 8 (Eight) Hours As Needed for Mild Pain .                Medication notes: Per PCP note, pt no longer taking sertraline.  On lexapro instead.    This medication list is complete to the best of my knowledge as of 10/3/2019    Please call if questions.    Pennie Herman, PharmD, MPH, BCPS    10/3/2019 10:29 AM

## 2019-10-03 NOTE — CONSULTS
Patient Name:  Va Pastor  YOB: 1965  MRN:  [id number]  Date of Admission:  10/3/2019  Date of Consult:  10/3/2019  Patient Care Team:  Faraz Phillip MD as PCP - General (Family Medicine)  Joyce Valverde MD as Consulting Physician (Family Medicine)    Inpatient Hospitalist Consult  Consult performed by: Jennifer Seymour APRN  Consult ordered by: Trey Kenney III, MD  Reason for consult: Medical evaluation contributing to current psychiatric illness.        Date of Admit: 10/3/2019  Date of Consult: 10/3/2019      Subjective     History of Present Illness  Ms. Pastor is a 54 y.o. female admitted to the Crisis Management Unit for further evaluation regarding bipolar depression with possible SI.  We were asked to see and evaluate her medical issues as they may pertain to her current psychiatric illness.  She has a history of severe COPD with continued tobacco abuse, hypothyroidism, restless leg syndrome, GERD.  Today she states her breathing is worse than baseline and she is not currently on her normal home 2 L oxygen.  Cough nonproductive is at baseline.  She denies symptoms of a URI.  Approximately 2 weeks ago she reports falling and complains of right shoulder pain since that time with limited range of motion in her affected arm.  She has lesions all over her body and states she has been living with her mother who has bedbugs.  Other family members in the household all have bug bites.  She denies methamphetamine use but her brother cooks meth.  She currently denies any chest pain, palpitations, and lightheadedness. she denies nausea or vomiting.  No recent illnesses.    Past Medical History:   Diagnosis Date   • Anxiety    • Arthritis    • Asthma    • Back pain     cronic   • Bipolar affective disorder (CMS/HCC)    • COPD (chronic obstructive pulmonary disease) (CMS/[facility])    • Depression     bipolar   • Emphysema of lung (CMS/[facility])    • GERD (gastroesophageal reflux  disease)    • H/O emphysema    • Hyperlipidemia    • Hypertension    • Osteoporosis    • Sleep apnea    • Sleep apnea    • Thyroid disease      Past Surgical History:   Procedure Laterality Date   • CERVICAL FUSION     • LUMBAR FUSION       Family History   Problem Relation Age of Onset   • Bleeding Disorder Mother    • Hypertension Mother    • Thyroid disease Mother    • Stroke Mother    • Arthritis Mother    • Hypertension Father    • Cancer Father         Prostate.   • Arthritis Father    • Uterine cancer Sister    • Hypertension Sister    • Thyroid disease Sister    • Nephrolithiasis Son      Social History     Tobacco Use   • Smoking status: Current Every Day Smoker     Packs/day: 1.00     Years: 38.00     Pack years: 38.00     Types: Cigarettes   • Smokeless tobacco: Never Used   • Tobacco comment: Has only 25% lung function per pt.   Substance Use Topics   • Alcohol use: No   • Drug use: No     Medications Prior to Admission   Medication Sig Dispense Refill Last Dose   • ALBUTEROL SULFATE  (90 Base) MCG/ACT inhaler INHALE 2 PUFFS BY MOUTH EVERY FOUR HOURS AS NEEDED FOR WHEEZING. 18 g 0 Taking   • alendronate (FOSAMAX) 70 MG tablet Take 1 tablet by mouth Every 7 (Seven) Days. 4 tablet 12 Taking   • BREO ELLIPTA 200-25 MCG/INH inhaler INHALE 1 PUFF BY MOUTH DAILY. 1 inhaler 6 Taking   • busPIRone (BUSPAR) 15 MG tablet Take 15 mg by mouth 2 (Two) Times a Day. For anxiety      • cetirizine (zyrTEC) 10 MG tablet Take 1 tablet by mouth Daily. For allergies 90 tablet 3 Taking   • escitalopram (LEXAPRO) 20 MG tablet Take 20 mg by mouth Daily.      • fluticasone ([employer] FLUTICASONE PROPIONATE) 50 MCG/ACT nasal spray 1 spray into the nostril(s) as directed by provider Daily. 16 g 5 Taking   • hydrOXYzine (ATARAX) 25 MG tablet Take 1 tablet by mouth Daily. 30 tablet 6 Taking   • ipratropium (ATROVENT) 0.02 % nebulizer solution Take 2.5 mL by nebulization 4 (Four) Times a Day. 320 mL 5 Taking   •  ipratropium-albuterol (DUO-NEB) 0.5-2.5 mg/3 ml nebulizer USE 1 VIAL PER NEBULIZER FOUR TIMES A DAY. 180 mL 0 Taking   • lamoTRIgine (LAMICTAL) 25 MG tablet Take 1 tablet once daily for two weeks and then increase to two tablets daily. 45 tablet 0    • levothyroxine (SYNTHROID, LEVOTHROID) 25 MCG tablet Take 25 mcg by mouth Daily.      • lisinopril-hydrochlorothiazide (PRINZIDE,ZESTORETIC) 20-25 MG per tablet Take 1 tablet by mouth Daily.      • LYRICA 200 MG capsule TAKE 1 CAPSULE BY MOUTH TWO TIMES A DAY. 60 capsule 2 Taking   • meloxicam (MOBIC) 15 MG tablet Take 1 tablet by mouth Daily. 30 tablet 3 Taking   • OLANZapine (zyPREXA) 7.5 MG tablet Take 7.5 mg by mouth Every Night.      • potassium chloride (K-DUR) 10 MEQ CR tablet Take 1 tablet by mouth 2 (Two) Times a Day. 60 tablet 5 Taking   • raNITIdine (ZANTAC) 300 MG tablet Take 300 mg by mouth Every Night.      • rOPINIRole (REQUIP) 0.25 MG tablet Take 1-2 tablets at bedtime.  Take 1 hour before bedtime. 60 tablet 3 Taking   • theophylline (AURORA-24) 400 MG 24 hr capsule Take 1 capsule by mouth Daily. 90 capsule 3 Taking   • vitamin D (ERGOCALCIFEROL) 28860 units capsule capsule Take 50,000 Units by mouth 1 (One) Time Per Week. For 20 doses as directed      • acetaminophen (TYLENOL) 650 MG 8 hr tablet Take 1 tablet by mouth Every 8 (Eight) Hours As Needed for Mild Pain . 90 tablet 0 Taking     Allergies:  Sertraline and Diclofenac    Review of Systems   Constitutional: Negative for activity change, appetite change, chills, diaphoresis, fatigue, fever and unexpected weight change.   HENT: Negative for congestion, sinus pressure, sinus pain, sore throat and trouble swallowing.    Eyes: Negative for visual disturbance.   Respiratory: Positive for cough, chest tightness and shortness of breath. Negative for choking.    Cardiovascular: Negative for chest pain, palpitations and leg swelling.   Gastrointestinal: Negative for abdominal distention, abdominal pain,  blood in stool, constipation, diarrhea, nausea and vomiting.   Endocrine: Negative for polydipsia, polyphagia and polyuria.   Genitourinary: Negative for dysuria.   Musculoskeletal: Positive for arthralgias. Negative for back pain.   Skin: Positive for rash. Negative for color change.   Allergic/Immunologic: Negative for immunocompromised state.   Neurological: Negative for dizziness and syncope.   Hematological: Does not bruise/bleed easily.   Psychiatric/Behavioral: Negative.  Negative for confusion.       Objective      Vital Signs  Temp:  [97 °F (36.1 °C)-98.7 °F (37.1 °C)] 97 °F (36.1 °C)  Heart Rate:  [73-99] 75  Resp:  [16-22] 20  BP: (115-152)/(61-99) 115/61  Body mass index is 35.63 kg/m².    Physical Exam   Constitutional: She is oriented to person, place, and time. She appears well-developed and well-nourished.   Chronically ill-appearing, unkempt, anxious female.  She is resting quietly NAD when I enter the room quietly but then becomes very dramatic with her displays of shortness of breath and right shoulder pain. Sitter at bedside and states she has been breathing easily    HENT:   Head: Normocephalic and atraumatic.   Eyes: Pupils are equal, round, and reactive to light. No scleral icterus.   Cardiovascular: Normal rate, regular rhythm, normal heart sounds and intact distal pulses.   No murmur heard.  Pulmonary/Chest: Effort normal. No respiratory distress. She has wheezes (Diffuse mild to moderate).   No accessory muscle use or tachypnea   Abdominal: Soft. Bowel sounds are normal. She exhibits no shifting dullness, no distension, no pulsatile liver, no fluid wave, no abdominal bruit, no ascites, no pulsatile midline mass and no mass. There is no hepatosplenomegaly. There is no tenderness. There is no rigidity and no guarding. No hernia.   Musculoskeletal: She exhibits no edema.        Right shoulder: She exhibits decreased range of motion and tenderness.   Right upper extremity neurovascularly intact    Neurological: She is alert and oriented to person, place, and time. No cranial nerve deficit.   Skin: Skin is warm and dry. Capillary refill takes less than 2 seconds.   Scattered excoriated lesions over entire body in various stages of healing   Psychiatric:   Exaggerated and anxious   Nursing note and vitals reviewed.      Results Review:    I reviewed the patient's new clinical results.  Results from last 7 days   Lab Units 10/02/19  1705   WBC 10*3/mm3 9.03   HEMOGLOBIN g/dL 13.3   HEMATOCRIT % 42.8   PLATELETS 10*3/mm3 296     Results from last 7 days   Lab Units 10/02/19  1705   SODIUM mmol/L 136   POTASSIUM mmol/L 4.1   CHLORIDE mmol/L 99   CO2 mmol/L 25.7   BUN mg/dL 11   CREATININE mg/dL 0.92   CALCIUM mg/dL 8.8   BILIRUBIN mg/dL 0.2   ALK PHOS U/L 81   ALT (SGPT) U/L 8   AST (SGOT) U/L 12   GLUCOSE mg/dL 94       Assessment/Plan     Active Hospital Problems    Diagnosis  POA   • Infestation by bed bug [B88.8]  Yes   • Acute pain of right shoulder [M25.511]  Yes   • Bipolar 1 disorder (CMS/HCC) [F31.9]  Yes   • RLS (restless legs syndrome) [G25.81]  Yes   • Tobacco abuse [Z72.0]  Yes   • Hypothyroidism [E03.9]  Yes   • Chronic obstructive pulmonary disease (CMS/HCC) [J44.9]  Yes   • Bipolar affective disorder, currently manic, moderate (CMS/HCC) [F31.12]  Yes      Resolved Hospital Problems   No resolved problems to display.       Assessment & Plan  COPD without AE-continue home medications  · DuoNeb scheduled and as needed including now given wheezing  · Resume normal 2 L oxygen  · Work-up in ED including chest x-ray, troponin, EKG all unremarkable    Bedbug infestation  · Permethrin treatments  · Containment isolation protocol    HTN- continue home antihypertensive with holding parameters    Right shoulder pain  · Imaging after treated for bedbugs  · Lidocaine patch and Tylenol PRN     Tobacco abuse-continue NicoDerm patch.  No intention of quitting at this time.    Hypothyroidism-continue current dose  of replacement.  Check TSH as possible contributor to symptoms    RLS-continue home med    Psychiatric disorder-for to psychiatry for treatment    Assessment:   [facility] will follow       AL Bermeo  [facility]  10/03/19  1:47 PM

## 2019-10-03 NOTE — ED NOTES
This RN assumed care of pt from previous shift EMANI Busch, who states that pt has been refusing to keep any monitoring equipment on since approx midnight. This RN entered exam room to attempt to replace monitoring equipment and assess pt vitals. Pt refused. Security continues to sit at pt bedside. Pt does not appear to be in any acute distress at this time.      Jesica Mcmillan RN  10/03/19 0432

## 2019-10-03 NOTE — ED NOTES
Pt given food and drink. Pt still unable to give urine specimen.      Azul Perla RN  10/02/19 5439

## 2019-10-03 NOTE — CONSULTS
Attempted consult: Pt has two providers in her room and asked that she be interviewed at a later time.

## 2019-10-03 NOTE — ED NOTES
Pt found to be recording staff using her cell phone camera. This RN had been unaware until this point that pt had a cell phone in her possession, as her belongings had been taken and locked up by security per 72 hour hold policy. This RN verbalized to pt that recording is prohibited in this facility and that she is not supposed to have a cell phone right now.  at bedside then confiscated the pt's cell phone and placed it with her other secured belongings. Pt angry, yelling, using expletives, behaving aggressively toward staff. Charge EMANI Mooney notified.           Jesica Mcmillan RN  10/03/19 0646

## 2019-10-04 PROCEDURE — 94799 UNLISTED PULMONARY SVC/PX: CPT

## 2019-10-04 RX ADMIN — LEVOTHYROXINE SODIUM 25 MCG: 25 TABLET ORAL at 09:28

## 2019-10-04 RX ADMIN — LAMOTRIGINE 25 MG: 25 TABLET ORAL at 20:54

## 2019-10-04 RX ADMIN — PREGABALIN 200 MG: 100 CAPSULE ORAL at 20:54

## 2019-10-04 RX ADMIN — IPRATROPIUM BROMIDE AND ALBUTEROL SULFATE 3 ML: 2.5; .5 SOLUTION RESPIRATORY (INHALATION) at 12:30

## 2019-10-04 RX ADMIN — ACETAMINOPHEN 650 MG: 325 TABLET, FILM COATED ORAL at 02:59

## 2019-10-04 RX ADMIN — FAMOTIDINE 20 MG: 20 TABLET, FILM COATED ORAL at 09:29

## 2019-10-04 RX ADMIN — MELOXICAM 15 MG: 15 TABLET ORAL at 09:29

## 2019-10-04 RX ADMIN — IPRATROPIUM BROMIDE AND ALBUTEROL SULFATE 3 ML: 2.5; .5 SOLUTION RESPIRATORY (INHALATION) at 15:08

## 2019-10-04 RX ADMIN — IPRATROPIUM BROMIDE AND ALBUTEROL SULFATE 3 ML: 2.5; .5 SOLUTION RESPIRATORY (INHALATION) at 07:16

## 2019-10-04 RX ADMIN — FLUTICASONE PROPIONATE 1 SPRAY: 50 SPRAY, METERED NASAL at 09:27

## 2019-10-04 RX ADMIN — BUDESONIDE AND FORMOTEROL FUMARATE DIHYDRATE 2 PUFF: 160; 4.5 AEROSOL RESPIRATORY (INHALATION) at 20:44

## 2019-10-04 RX ADMIN — PREGABALIN 200 MG: 100 CAPSULE ORAL at 09:29

## 2019-10-04 RX ADMIN — LIDOCAINE 1 PATCH: 50 PATCH TOPICAL at 09:27

## 2019-10-04 RX ADMIN — CETIRIZINE HYDROCHLORIDE 10 MG: 10 TABLET, FILM COATED ORAL at 09:28

## 2019-10-04 RX ADMIN — LISINOPRIL: 20 TABLET ORAL at 09:28

## 2019-10-04 RX ADMIN — IPRATROPIUM BROMIDE AND ALBUTEROL SULFATE 3 ML: 2.5; .5 SOLUTION RESPIRATORY (INHALATION) at 20:45

## 2019-10-04 RX ADMIN — BUDESONIDE AND FORMOTEROL FUMARATE DIHYDRATE 2 PUFF: 160; 4.5 AEROSOL RESPIRATORY (INHALATION) at 07:16

## 2019-10-04 RX ADMIN — THEOPHYLLINE ANHYDROUS 400 MG: 200 CAPSULE, EXTENDED RELEASE ORAL at 09:29

## 2019-10-04 NOTE — PROGRESS NOTES
The patient is much more pleasant and cooperative today.  She laments her current homeless status and social work staff will work with her passport  regarding this issue.  She remains oxygen dependent at all times.    She is now on isolation having been found to be positive for bedbugs.

## 2019-10-04 NOTE — PLAN OF CARE
Problem: Patient Care Overview  Goal: Plan of Care Review  Outcome: Ongoing (interventions implemented as appropriate)   10/03/19 1612 10/03/19 2151 10/04/19 0408   OTHER   Outcome Summary --  --  Pt denied all issues, no thoughts to hurt self or others. Pt stayed in room this shift, sitter remains at bedside for safety. PRN tylenol given d/t c/o pain in right shoulder of 5/10. Pt has been pleasant, cooperative and compliant with medications. Will continue to provide feedback and support.   Coping/Psychosocial   Plan of Care Reviewed With --  patient --    Plan of Care Review   Progress no change --  --    Coping/Psychosocial   Patient Agreement with Plan of Care --  agrees --        Problem: Overarching Goals (Adult)  Goal: Adheres to Safety Considerations for Self and Others  Outcome: Ongoing (interventions implemented as appropriate)   10/04/19 0408   Overarching Goals (Adult)   Adheres to Safety Considerations for Self and Others making progress toward outcome     Goal: Optimized Coping Skills in Response to Life Stressors  Outcome: Ongoing (interventions implemented as appropriate)   10/04/19 0408   Overarching Goals (Adult)   Optimized Coping Skills in Response to Life Stressors making progress toward outcome     Goal: Develops/Participates in Therapeutic [place] to Support Successful Transition  Outcome: Ongoing (interventions implemented as appropriate)   10/04/19 0408   Overarching Goals (Adult)   Develops/Participates in Therapeutic [place] to Support Successful Transition making progress toward outcome       Problem: Mood Impairment (Depressive Signs/Symptoms) (Adult)  Goal: Improved Mood Symptoms (Depressive Signs/Symptoms)  Outcome: Ongoing (interventions implemented as appropriate)   10/04/19 0408   Improved Mood Symptoms (Depressive Signs/Symptoms)   Improved Mood Symptoms Action Step (STG) Outcome making progress toward outcome       Problem: Feelings of Worthlessness, Hopelessness, Excessive Guilt  (Depressive Signs/Symptoms) (Adult)  Goal: Enhanced Self-Esteem/Confidence (Depressive Signs/Symptoms)  Outcome: Ongoing (interventions implemented as appropriate)   10/04/19 0408   Enhanced Self-Esteem/Confidence (Depressive Signs/Symptoms)   Enhanced Self-Esteem/Confidence Action Step (STG) Outcome making progress toward outcome       Problem: Suicidal Behavior (Adult)  Goal: Suicidal Behavior is Absent/Minimized/Managed  Outcome: Ongoing (interventions implemented as appropriate)   10/04/19 0408   Suicidal Behavior is Absent/Minimized/Managed   Suicidal Behavior Managed/Minimized Action Step (STG) Outcome making progress toward outcome       Problem: Pain, Chronic (Adult)  Goal: Acceptable Pain/Comfort Level and Functional Ability  Outcome: Ongoing (interventions implemented as appropriate)   10/04/19 0408   Pain, Chronic (Adult)   Acceptable Pain/Comfort Level and Functional Ability making progress toward outcome       Problem: Impaired Control (Excessive Substance Use) (Adult)  Goal: Participates in Recovery Program (Excessive Substance Use)  Outcome: Ongoing (interventions implemented as appropriate)   10/04/19 0408   Participates in Recovery Program (Excessive Substance Use)   Participates in Recovery Program Action Step (STG) Outcome making progress toward outcome       Problem: Social/Occupational/Functional Impairment (Excessive Substance Use) (Adult)  Goal: Improved Social/Occupational/Functional Skills (Excessive Substance Use)  Outcome: Ongoing (interventions implemented as appropriate)   10/04/19 0408   Improved Social/Occupational/Functional Skills (Excessive Substance Use)   Improved Social/Occupational/Functional Skills Action Step (STG) Outcome making progress toward outcome       Problem: Safety Awareness Impairment (Excessive Substance Use) (Adult)  Goal: Enhanced Safety Awareness (Excessive Substance Use)  Outcome: Ongoing (interventions implemented as appropriate)   10/04/19 0408   Enhanced  Safety Awareness (Excessive Substance Use)   Enhanced Safety Awareness Action Step (STG) Outcome making progress toward outcome       Problem: Physiological Impairment (Excessive Substance Use) (Adult)  Goal: Improved Physiologic Symptoms (Excessive Substance Use)  Outcome: Ongoing (interventions implemented as appropriate)   10/04/19 0408   Improved Physiologic Symptoms (Excessive Substance Use)   Improved Physiologic Symptoms Action Step (STG) Outcome making progress toward outcome

## 2019-10-04 NOTE — PLAN OF CARE
Problem: Patient Care Overview  Goal: Plan of Care Review  Outcome: Ongoing (interventions implemented as appropriate)   10/04/19 1500 10/04/19 1742   OTHER   Outcome Summary --  Patient has been compliant with all ADLs. Patient remains 1:1 with sitter for safety. Patient denies SI or HI. Patient reports that she does not take blood pressure medication anymore. That's why her B/P was so low at 1800. Will make a notation on the MAR and with oncoming nurse. Patient continues to wear O2 at 2 L per nasal canula. No respiratory distress noted.   Coping/Psychosocial   Plan of Care Reviewed With --  patient   Plan of Care Review   Progress --  improving   Coping/Psychosocial   Patient Agreement with Plan of Care agrees --        Problem: Pain, Chronic (Adult)  Intervention: Mutually Develop/Implement Persistent Pain Management Plan   10/04/19 1742   Promote Oxygenation/Perfusion   Pain Management Interventions pain management plan reviewed with patient/caregiver;pillow support provided   Cognitive Interventions   Sensory Stimulation Regulation quiet environment promoted         Problem: Safety Awareness Impairment (Excessive Substance Use) (Adult)  Intervention: Promote Safety Awareness   10/04/19 1742   Promote Safety Awareness   Mutually Determined Action Steps (Promote Safety Awareness) identifies major stressors;identifies risky behavior;verbalizes safe alternatives   Coping/Psychosocial Interventions   Supportive Measures decision-making supported;positive reinforcement provided;self-care encouraged;self-reflection promoted;self-responsibility promoted;verbalization of feelings encouraged

## 2019-10-04 NOTE — PROGRESS NOTES
chart reviewed, labs ok, XR R shoulder likely chronic. if pain persists, could follow up outpatient with an orthopedist. We will sign off at this time. Please contact us if you require further assistance.    Bhavna Parra, APRN  10/4/19 9099

## 2019-10-05 VITALS
BODY MASS INDEX: 35.85 KG/M2 | RESPIRATION RATE: 18 BRPM | SYSTOLIC BLOOD PRESSURE: 113 MMHG | DIASTOLIC BLOOD PRESSURE: 68 MMHG | TEMPERATURE: 97.5 F | HEIGHT: 62 IN | WEIGHT: 194.8 LBS | OXYGEN SATURATION: 95 % | HEART RATE: 88 BPM

## 2019-10-05 PROCEDURE — 94799 UNLISTED PULMONARY SVC/PX: CPT

## 2019-10-05 RX ORDER — NICOTINE 21 MG/24HR
1 PATCH, TRANSDERMAL 24 HOURS TRANSDERMAL EVERY 24 HOURS
Qty: 7 PATCH | Refills: 0 | Status: SHIPPED | OUTPATIENT
Start: 2019-10-06 | End: 2019-10-15

## 2019-10-05 RX ADMIN — THEOPHYLLINE ANHYDROUS 400 MG: 200 CAPSULE, EXTENDED RELEASE ORAL at 11:14

## 2019-10-05 RX ADMIN — IPRATROPIUM BROMIDE AND ALBUTEROL SULFATE 3 ML: 2.5; .5 SOLUTION RESPIRATORY (INHALATION) at 12:08

## 2019-10-05 RX ADMIN — PREGABALIN 200 MG: 100 CAPSULE ORAL at 11:15

## 2019-10-05 RX ADMIN — BUDESONIDE AND FORMOTEROL FUMARATE DIHYDRATE 2 PUFF: 160; 4.5 AEROSOL RESPIRATORY (INHALATION) at 08:41

## 2019-10-05 RX ADMIN — FLUTICASONE PROPIONATE 1 SPRAY: 50 SPRAY, METERED NASAL at 11:16

## 2019-10-05 RX ADMIN — MELOXICAM 15 MG: 15 TABLET ORAL at 11:14

## 2019-10-05 RX ADMIN — FAMOTIDINE 20 MG: 20 TABLET, FILM COATED ORAL at 11:14

## 2019-10-05 RX ADMIN — LEVOTHYROXINE SODIUM 25 MCG: 25 TABLET ORAL at 08:10

## 2019-10-05 RX ADMIN — CETIRIZINE HYDROCHLORIDE 10 MG: 10 TABLET, FILM COATED ORAL at 11:14

## 2019-10-05 RX ADMIN — LIDOCAINE 1 PATCH: 50 PATCH TOPICAL at 11:13

## 2019-10-05 NOTE — H&P
DATES OF ADMISSION: 10/3/2019-10/5/2019    REASON FOR ADMISSION: The patient is a 54-year-old white female admitted after family had summoned EMS to their home after the patient had taken some of her sister's Dilaudid.    LABS: See chart    HOSPITAL COURSE: Patient was admitted to the crisis management and placed on suicide precautions these were reduced to low risk after the patient is able to promise safety in the hospital.  But these were left in place for the first 24 hours of hospitalization given the patient's manipulative and unusual behavior in the emergency room.  The patient was continued on previously prescribed Lamictal but other prescribed psychiatric medications include Zyprexa Lexapro and Tegretol were all discontinued as the patient is compliant with his medications anyway.  The patient became more pleasant cooperative to stay in the hospital progressed and by 10 5 was in brighter spirits.  She had made arrangements to stay with her daughter following discharge and was bright and euthymic and future oriented when seen by this physician on 10/5/2019 she denied suicidal ideation at that time and discharge was ordered as per her request.      FINAL DIAGNOSIS: Bipolar disorder depressed phase, opioid use disorder, borderline personality traits versus disorder, COPD, history of chronic back pain    DISPOSITION ON DISCHARGE: A full listing the patient's medications is provided below.  Follow-up with a place with community mental health resources.  Patient is informed of the risks of Lamictal including the risk of rash Serna-Jewel syndrome and death with this medication.  She is instructed to contact a medical provider immediately should any skin changes occur.    PROGNOSIS: Guarded to fair       Discharge Medications      New Medications      Instructions Start Date   nicotine 21 MG/24HR patch  Commonly known as:  NICODERM CQ   1 patch, Transdermal, Every 24 Hours   Start Date:  10/6/2019         Continue These Medications      Instructions Start Date   albuterol sulfate  (90 Base) MCG/ACT inhaler  Commonly known as:  PROVENTIL HFA;VENTOLIN HFA;PROAIR HFA   INHALE 2 PUFFS BY MOUTH EVERY FOUR HOURS AS NEEDED FOR WHEEZING.      alendronate 70 MG tablet  Commonly known as:  FOSAMAX   70 mg, Oral, Every 7 Days      BREO ELLIPTA 200-25 MCG/INH inhaler  Generic drug:  Fluticasone Furoate-Vilanterol   INHALE 1 PUFF BY MOUTH DAILY.      busPIRone 15 MG tablet  Commonly known as:  BUSPAR   15 mg, Oral, 2 Times Daily, For anxiety       cetirizine 10 MG tablet  Commonly known as:  zyrTEC   10 mg, Oral, Daily, For allergies      fluticasone 50 MCG/ACT nasal spray  Commonly known as:  [employer] FLUTICASONE PROPIONATE   1 spray, Nasal, Daily      hydrOXYzine 25 MG tablet  Commonly known as:  ATARAX   25 mg, Oral, Daily      ipratropium 0.02 % nebulizer solution  Commonly known as:  ATROVENT   500 mcg, Nebulization, 4 Times Daily - RT      lamoTRIgine 25 MG tablet  Commonly known as:  LAMICTAL   Take 1 tablet once daily for two weeks and then increase to two tablets daily.      levothyroxine 25 MCG tablet  Commonly known as:  SYNTHROID, LEVOTHROID   25 mcg, Oral, Daily      lisinopril-hydrochlorothiazide 20-25 MG per tablet  Commonly known as:  PRINZIDE,ZESTORETIC   1 tablet, Oral, Daily      LYRICA 200 MG capsule  Generic drug:  pregabalin   TAKE 1 CAPSULE BY MOUTH TWO TIMES A DAY.      meloxicam 15 MG tablet  Commonly known as:  MOBIC   15 mg, Oral, Daily      potassium chloride 10 MEQ CR tablet  Commonly known as:  K-DUR   10 mEq, Oral, 2 Times Daily      raNITIdine 300 MG tablet  Commonly known as:  ZANTAC   300 mg, Oral, Nightly      rOPINIRole 0.25 MG tablet  Commonly known as:  REQUIP   Take 1-2 tablets at bedtime.  Take 1 hour before bedtime.      theophylline 400 MG 24 hr capsule  Commonly known as:  AURORA-24   400 mg, Oral, Daily      vitamin D 96480 units capsule capsule  Commonly known as:  ERGOCALCIFEROL    50,000 Units, Oral, Weekly, For 20 doses as directed         Stop These Medications    acetaminophen 650 MG 8 hr tablet  Commonly known as:  TYLENOL     escitalopram 20 MG tablet  Commonly known as:  LEXAPRO     ipratropium-albuterol 0.5-2.5 mg/3 ml nebulizer  Commonly known as:  DUO-NEB     OLANZapine 7.5 MG tablet  Commonly known as:  zyPREXA

## 2019-10-05 NOTE — PLAN OF CARE
Problem: Patient Care Overview  Goal: Plan of Care Review  Outcome: Ongoing (interventions implemented as appropriate)   10/04/19 1742 10/05/19 0211 10/05/19 0334   OTHER   Outcome Summary --  --  Pt out of room more this shift, out in milieu and engages with peers and staff. Pt attended nightly community group. Cooperative and compliant with medications. Sitter remains at bedside for safety. Pt denies thoughts to hurt self or others. Pt denied anx/dep, no hallucinations. Will continue to provide feedback and support.   Coping/Psychosocial   Plan of Care Reviewed With --  patient --    Plan of Care Review   Progress improving --  --    Coping/Psychosocial   Patient Agreement with Plan of Care --  agrees --        Problem: Overarching Goals (Adult)  Goal: Adheres to Safety Considerations for Self and Others  Outcome: Ongoing (interventions implemented as appropriate)   10/05/19 0334   Overarching Goals (Adult)   Adheres to Safety Considerations for Self and Others making progress toward outcome     Goal: Optimized Coping Skills in Response to Life Stressors  Outcome: Ongoing (interventions implemented as appropriate)   10/05/19 0334   Overarching Goals (Adult)   Optimized Coping Skills in Response to Life Stressors making progress toward outcome     Goal: Develops/Participates in Therapeutic Foley to Support Successful Transition  Outcome: Ongoing (interventions implemented as appropriate)   10/05/19 0334   Overarching Goals (Adult)   Develops/Participates in Therapeutic Foley to Support Successful Transition making progress toward outcome       Problem: Mood Impairment (Depressive Signs/Symptoms) (Adult)  Goal: Improved Mood Symptoms (Depressive Signs/Symptoms)  Outcome: Ongoing (interventions implemented as appropriate)   10/05/19 0334   Improved Mood Symptoms (Depressive Signs/Symptoms)   Improved Mood Symptoms Action Step (STG) Outcome making progress toward outcome       Problem: Feelings of Worthlessness,  Hopelessness, Excessive Guilt (Depressive Signs/Symptoms) (Adult)  Goal: Enhanced Self-Esteem/Confidence (Depressive Signs/Symptoms)  Outcome: Ongoing (interventions implemented as appropriate)   10/05/19 0334   Enhanced Self-Esteem/Confidence (Depressive Signs/Symptoms)   Enhanced Self-Esteem/Confidence Action Step (STG) Outcome making progress toward outcome       Problem: Suicidal Behavior (Adult)  Goal: Suicidal Behavior is Absent/Minimized/Managed  Outcome: Ongoing (interventions implemented as appropriate)   10/05/19 0334   Suicidal Behavior is Absent/Minimized/Managed   Suicidal Behavior Managed/Minimized Action Step (STG) Outcome making progress toward outcome       Problem: Pain, Chronic (Adult)  Goal: Acceptable Pain/Comfort Level and Functional Ability  Outcome: Ongoing (interventions implemented as appropriate)   10/05/19 0334   Pain, Chronic (Adult)   Acceptable Pain/Comfort Level and Functional Ability making progress toward outcome       Problem: Impaired Control (Excessive Substance Use) (Adult)  Goal: Participates in Recovery Program (Excessive Substance Use)  Outcome: Ongoing (interventions implemented as appropriate)   10/05/19 0334   Participates in Recovery Program (Excessive Substance Use)   Participates in Recovery Program Action Step (STG) Outcome making progress toward outcome       Problem: Social/Occupational/Functional Impairment (Excessive Substance Use) (Adult)  Goal: Improved Social/Occupational/Functional Skills (Excessive Substance Use)  Outcome: Ongoing (interventions implemented as appropriate)   10/05/19 0334   Improved Social/Occupational/Functional Skills (Excessive Substance Use)   Improved Social/Occupational/Functional Skills Action Step (STG) Outcome making progress toward outcome       Problem: Safety Awareness Impairment (Excessive Substance Use) (Adult)  Goal: Enhanced Safety Awareness (Excessive Substance Use)  Outcome: Ongoing (interventions implemented as  appropriate)   10/05/19 0334   Enhanced Safety Awareness (Excessive Substance Use)   Enhanced Safety Awareness Action Step (STG) Outcome making progress toward outcome       Problem: Physiological Impairment (Excessive Substance Use) (Adult)  Goal: Improved Physiologic Symptoms (Excessive Substance Use)  Outcome: Ongoing (interventions implemented as appropriate)   10/05/19 0334   Improved Physiologic Symptoms (Excessive Substance Use)   Improved Physiologic Symptoms Action Step (STG) Outcome making progress toward outcome

## 2019-10-06 DIAGNOSIS — L90.5 SCAR CONDITION AND FIBROSIS OF SKIN: ICD-10-CM

## 2019-10-06 DIAGNOSIS — F41.9 ANXIETY AND DEPRESSION: ICD-10-CM

## 2019-10-06 DIAGNOSIS — F31.12 BIPOLAR AFFECTIVE DISORDER, CURRENTLY MANIC, MODERATE (HCC): ICD-10-CM

## 2019-10-06 DIAGNOSIS — F32.A ANXIETY AND DEPRESSION: ICD-10-CM

## 2019-10-07 RX ORDER — ESCITALOPRAM OXALATE 20 MG/1
20 TABLET ORAL DAILY
Qty: 30 TABLET | Refills: 5 | OUTPATIENT
Start: 2019-10-07

## 2019-10-07 RX ORDER — LAMOTRIGINE 25 MG/1
25 TABLET ORAL 2 TIMES DAILY
Qty: 60 TABLET | Refills: 0 | Status: SHIPPED | OUTPATIENT
Start: 2019-10-07 | End: 2019-10-15 | Stop reason: SDUPTHER

## 2019-10-07 RX ORDER — PREGABALIN 200 MG/1
CAPSULE ORAL
Qty: 60 CAPSULE | Refills: 0 | Status: SHIPPED | OUTPATIENT
Start: 2019-10-07 | End: 2019-11-13

## 2019-10-07 NOTE — CONSULTS
Late entry [facility]SAF consult:  Reviewed patient's chart.  Patient reports using 4 mg dilaudid for shoulder pain but denies use of any drugs other than her prescribed medications.  [facility] counselor attempted to meet with patient but was asked to return due to other providers in the room.  Patient was then put in isolation due to bedbugs and  Discharged over the weekend.   will attempt to contact patient to discuss aftercare.

## 2019-10-07 NOTE — TELEPHONE ENCOUNTER
Last medication agreement: 01/25/2019. This was completed with a different provider. She will need a new one.  Last winnie: 07/20/2019  Last UDS: 08/25/2019  Last OV: 09/05/2019

## 2019-10-07 NOTE — TELEPHONE ENCOUNTER
Patient will need a hospital follow-up appointment for further refills.  I gave her enough for 1 month.

## 2019-10-08 NOTE — PROGRESS NOTES
Continued Stay Note  Psychiatric     Patient Name: Va Pastor  MRN: [id number]  Today's Date: 10/8/2019    Admit Date: 10/3/2019    Discharge Plan     Row Name 10/08/19 1550       Plan    Final Discharge Disposition Code  01 - home or self-care    Final Note  Pt was discharged on 10/5 per Dr. Kenney's orders.  SW called and left a vm message for pt at [phone] to discuss follow-up care.  Pt has not called back as of today.        Discharge Codes    No documentation.       Expected Discharge Date and Time     Expected Discharge Date Expected Discharge Time    Oct 5, 2019             NINO Chan

## 2019-10-15 ENCOUNTER — OFFICE VISIT (OUTPATIENT)
Dept: FAMILY MEDICINE CLINIC | Facility: CLINIC | Age: 54
End: 2019-10-15

## 2019-10-15 VITALS
HEIGHT: 62 IN | WEIGHT: 193.8 LBS | OXYGEN SATURATION: 98 % | SYSTOLIC BLOOD PRESSURE: 128 MMHG | DIASTOLIC BLOOD PRESSURE: 78 MMHG | RESPIRATION RATE: 16 BRPM | BODY MASS INDEX: 35.66 KG/M2 | HEART RATE: 104 BPM | TEMPERATURE: 98.8 F

## 2019-10-15 DIAGNOSIS — Z23 NEED FOR INFLUENZA VACCINATION: ICD-10-CM

## 2019-10-15 DIAGNOSIS — J44.9 CHRONIC OBSTRUCTIVE PULMONARY DISEASE, UNSPECIFIED COPD TYPE (HCC): ICD-10-CM

## 2019-10-15 DIAGNOSIS — G89.29 CHRONIC RIGHT SHOULDER PAIN: ICD-10-CM

## 2019-10-15 DIAGNOSIS — M25.511 CHRONIC RIGHT SHOULDER PAIN: ICD-10-CM

## 2019-10-15 DIAGNOSIS — F31.12 BIPOLAR AFFECTIVE DISORDER, CURRENTLY MANIC, MODERATE (HCC): Primary | ICD-10-CM

## 2019-10-15 PROCEDURE — 20610 DRAIN/INJ JOINT/BURSA W/O US: CPT | Performed by: FAMILY MEDICINE

## 2019-10-15 PROCEDURE — 99215 OFFICE O/P EST HI 40 MIN: CPT | Performed by: FAMILY MEDICINE

## 2019-10-15 PROCEDURE — 90471 IMMUNIZATION ADMIN: CPT | Performed by: FAMILY MEDICINE

## 2019-10-15 PROCEDURE — 90674 CCIIV4 VAC NO PRSV 0.5 ML IM: CPT | Performed by: FAMILY MEDICINE

## 2019-10-15 RX ORDER — LAMOTRIGINE 25 MG/1
75 TABLET ORAL DAILY
Qty: 63 TABLET | Refills: 0 | Status: SHIPPED | OUTPATIENT
Start: 2019-10-15 | End: 2019-11-13

## 2019-10-15 RX ORDER — ALBUTEROL SULFATE 90 UG/1
2 AEROSOL, METERED RESPIRATORY (INHALATION) EVERY 4 HOURS PRN
Qty: 18 G | Refills: 2 | Status: SHIPPED | OUTPATIENT
Start: 2019-10-15 | End: 2020-03-27 | Stop reason: SDUPTHER

## 2019-10-15 RX ORDER — TRIAMCINOLONE ACETONIDE 40 MG/ML
40 INJECTION, SUSPENSION INTRA-ARTICULAR; INTRAMUSCULAR ONCE
Status: COMPLETED | OUTPATIENT
Start: 2019-10-15 | End: 2019-10-15

## 2019-10-15 RX ADMIN — TRIAMCINOLONE ACETONIDE 40 MG: 40 INJECTION, SUSPENSION INTRA-ARTICULAR; INTRAMUSCULAR at 14:40

## 2019-10-15 RX ADMIN — TRIAMCINOLONE ACETONIDE 40 MG: 40 INJECTION, SUSPENSION INTRA-ARTICULAR; INTRAMUSCULAR at 14:41

## 2019-10-15 NOTE — PROGRESS NOTES
Transitional Care Follow Up Visit  Subjective     Va Pastor is a 54 y.o. female who presents for a transitional care management visit.    Within 48 business hours after discharge our office contacted her via telephone to coordinate her care and needs.      I reviewed and discussed the details of that call along with the discharge summary, hospital problems, inpatient lab results, inpatient diagnostic studies, and consultation reports with Va.     Current outpatient and discharge medications have been reconciled for the patient.  Reviewed by: Bernadette Escobedo MA      No flowsheet data found.  Risk for Readmission (LACE) Score: 9 (10/5/2019  6:00 AM)      History of Present Illness   Course During Hospital Stay:    Patient was seen at the psychiatric hospital at [facility] from October 3 to October 5, 2019 and she was treated for bipolar disorder depressed phase, opioid use disorder, borderline personality traits versus disorder, COPD, history of chronic back pain.  She was basically weaned down to Lamictal for her mood stabilization and she was doing really well on it.  She says that she is been out of the hospital she is been feeling much more like herself a much more goal oriented.  She does not remember a whole lot of what happened outside of the hospital.  Also needs a refill on her COPD inhalers.  COPD is currently stable.  Also needs next increase in lamotrigine to 75mg.    She also wanted me to address today her right shoulder which is been bothering her a lot.  While she was inpatient and they took some pictures of her shoulder and did show some chronic changes with nothing acute.  She says she had injected a long time ago and it seemed to help but is been at least years since then.     The following portions of the patient's history were reviewed and updated as appropriate: allergies, current medications, past family history, past medical history, past social history, past surgical history and problem  list.    Review of Systems   Constitutional: Negative for fatigue and fever.   Respiratory: Negative for shortness of breath and wheezing.    Cardiovascular: Negative for chest pain and palpitations.   Gastrointestinal: Negative for constipation and nausea.   All other systems reviewed and are negative.      Objective   Physical Exam   Constitutional: She is oriented to person, place, and time. She appears well-developed and well-nourished. No distress.   HENT:   Mouth/Throat: Oropharynx is clear and moist. No oropharyngeal exudate.   Eyes: Conjunctivae are normal. Right eye exhibits no discharge. Left eye exhibits no discharge.   Neck: Neck supple.   Cardiovascular: Normal rate, regular rhythm and normal heart sounds. Exam reveals no gallop and no friction rub.   No murmur heard.  Pulmonary/Chest: Effort normal and breath sounds normal. She has no wheezes. She has no rales.   Abdominal: Soft. Bowel sounds are normal. She exhibits no distension. There is no tenderness.   Musculoskeletal: She exhibits no edema or deformity.   Lymphadenopathy:     She has no cervical adenopathy.   Neurological: She is alert and oriented to person, place, and time.   Skin: Skin is warm and dry. No rash noted.   Psychiatric: She has a normal mood and affect. Her behavior is normal.   Nursing note and vitals reviewed.    Shoulder Injection Procedure Note    Pre-operative Diagnosis: right chronic shoulder pain    Post-operative Diagnosis: same    Indications: Symptom relief from osteoarthritis    Anesthesia: ethyl chloride spray     Procedure Details     Verbal consent was obtained for the procedure. The shoulder was prepped with alcohol and the skin was anesthetized. Using a 25 gauge needle the glenohumeral joint is injected with 6 mL 1% lidocaine and 2 mL of triamcinolone (KENALOG) 40mg/ml under the posterior aspect of the acromion. The injection site was cleansed with topical isopropyl alcohol and a dressing was  applied.    Complications:  None; patient tolerated the procedure well.      Assessment/Plan   Va was seen today for follow-up.    Diagnoses and all orders for this visit:    Bipolar affective disorder, currently manic, moderate (CMS/HCC)  -     lamoTRIgine (LaMICtal) 25 MG tablet; Take 3 tablets by mouth Daily.    Chronic obstructive pulmonary disease, unspecified COPD type (CMS/HCC)  -     Fluticasone Furoate-Vilanterol (BREO ELLIPTA) 200-25 MCG/INH inhaler; Inhale 1 puff Daily.  -     tiotropium bromide monohydrate (SPIRIVA RESPIMAT) 2.5 MCG/ACT aerosol solution inhaler; Inhale 2 puffs Daily.  -     triamcinolone acetonide (KENALOG-40) injection 40 mg    Chronic right shoulder pain  -     triamcinolone acetonide (KENALOG-40) injection 40 mg    Need for influenza vaccination  -     Flucelvax Quad=>4Years ([id number])    Plan as above.  Refilled the above medications.  Injected the shoulder which she tolerated well.  Plan to see back in about 2 weeks to decide if we can increase the lamotrigine again.

## 2019-10-15 NOTE — PATIENT INSTRUCTIONS
Bipolar 1 Disorder  Bipolar 1 disorder is a mental health disorder in which a person has episodes of emotional highs (ben), and may also have episodes of emotional lows (depression) in addition to highs. Bipolar 1 disorder is different from other bipolar disorders because it involves extreme manic episodes. These episodes last at least one week or involve symptoms that are so severe that hospitalization is needed to keep the person safe.  What increases the risk?  The cause of this condition is not known. However, certain factors make you more likely to have bipolar disorder, such as:  · Having a family member with the disorder.  · An imbalance of certain chemicals in the brain (neurotransmitters).  · Stress, such as illness, financial problems, or a death.  · Certain conditions that affect the brain or spinal cord (neurologic conditions).  · Brain injury (trauma).  · Having another mental health disorder, such as:  ? Obsessive compulsive disorder.  ? Schizophrenia.  What are the signs or symptoms?  Symptoms of ben include:  · Very high self-esteem or self-confidence.  · Decreased need for sleep.  · Unusual talkativeness or feeling a need to keep talking. Speech may be very fast. It may seem like you cannot stop talking.  · Racing thoughts or constant talking, with quick shifts between topics that may or may not be related (flight of ideas).  · Decreased ability to focus or concentrate.  · Increased purposeful activity, such as work, studies, or social activity.  · Increased nonproductive activity. This could be pacing, squirming and fidgeting, or finger and toe tapping.  · Impulsive behavior and poor judgment. This may result in high-risk activities, such as having unprotected sex or spending a lot of money.  Symptoms of depression include:  · Feeling sad, hopeless, or helpless.  · Frequent or uncontrollable crying.  · Lack of feeling or caring about anything.  · Sleeping too much.  · Moving more slowly than  usual.  · Not being able to enjoy things you used to enjoy.  · Wanting to be alone all the time.  · Feeling guilty or worthless.  · Lack of energy or motivation.  · Trouble concentrating or remembering.  · Trouble making decisions.  · Increased appetite.  · Thoughts of death, or the desire to harm yourself.  Sometimes, you may have a mixed mood. This means having symptoms of depression and ben. Stress can make symptoms worse.  How is this diagnosed?  To diagnose bipolar disorder, your health care provider may ask about your:  · Emotional episodes.  · Medical history.  · Alcohol and drug use. This includes prescription medicines. Certain medical conditions and substances can cause symptoms that seem like bipolar disorder (secondary bipolar disorder).  How is this treated?  Bipolar disorder is a long-term (chronic) illness. It is best controlled with ongoing (continuous) treatment rather than treatment only when symptoms occur. Treatment may include:  · Medicine. Medicine can be prescribed by a provider who specializes in treating mental disorders (psychiatrist).  ? Medicines called mood stabilizers are usually prescribed.  ? If symptoms occur even while taking a mood stabilizer, other medicines may be added.  · Psychotherapy. Some forms of talk therapy, such as cognitive-behavioral therapy (CBT), can provide support, education, and guidance.  · Coping methods, such as journaling or relaxation exercises. These may include:  ? Yoga.  ? Meditation.  ? Deep breathing.  · Lifestyle changes, such as:  ? Limiting alcohol and drug use.  ? Exercising regularly.  ? Getting plenty of sleep.  ? Making healthy eating choices.    A combination of medicine, talk therapy, and coping methods is best. A procedure in which electricity is applied to the brain through the scalp (electroconvulsive therapy) may be used in cases of severe ben when medicine and psychotherapy work too slowly or do not work.  Follow these instructions at  home:  Activity    · Return to your normal activities as told by your health care provider.  · Find activities that you enjoy, and make time to do them.  · Exercise regularly as told by your health care provider.  Lifestyle  · Limit alcohol intake to no more than 1 drink a day for nonpregnant women and 2 drinks a day for men. One drink equals 12 oz of beer, 5 oz of wine, or 1½ oz of hard liquor.  · Follow a set schedule for eating and sleeping.  · Eat a balanced diet that includes fresh fruits and vegetables, whole grains, low-fat dairy, and lean meat.  · Get 7-8 hours of sleep each night.  General instructions  · Take over-the-counter and prescription medicines only as told by your health care provider.  · Think about joining a support group. Your health care provider may be able to recommend a support group.  · Talk with your family and loved ones about your treatment goals and how they can help.  · Keep all follow-up visits as told by your health care provider. This is important.  Where to find more information  For more information about bipolar disorder, visit the following websites:  · National [place] on Mental Illness: www.edgar.org  · [place] National [place] of Mental Health: www.nimh.nih.gov  Contact a health care provider if:  · Your symptoms get worse.  · You have side effects from your medicine, and they get worse.  · You have trouble sleeping.  · You have trouble doing daily activities.  · You feel unsafe in your surroundings.  · You are dealing with substance abuse.  Get help right away if:  · You have new symptoms.  · You have thoughts about harming yourself.  · You self-harm.  This information is not intended to replace advice given to you by your health care provider. Make sure you discuss any questions you have with your health care provider.  Document Released: 03/26/2002 Document Revised: 08/13/2017 Document Reviewed: 08/17/2017  Elsevier Interactive Patient Education © 2019 [employer] Inc[employer]

## 2019-10-16 ENCOUNTER — TELEPHONE (OUTPATIENT)
Dept: FAMILY MEDICINE CLINIC | Facility: CLINIC | Age: 54
End: 2019-10-16

## 2019-10-16 NOTE — TELEPHONE ENCOUNTER
Lmtcb.     Pharmacy called earlier today requesting this. I told them that we are waiting on pt to call back since we are not going to fill this for a couple weeks.

## 2019-10-16 NOTE — TELEPHONE ENCOUNTER
----- Message from Faraz Phillip MD sent at 10/15/2019  5:52 PM EDT -----  I really want to give lamotrigine a couple more weeks before I add Chantix.  I want to make sure if there is a change in behavior that I can blame it on one drug rather than having to figure out which drug it was  ----- Message -----  From: Maricel Lunsford MA  Sent: 10/15/2019   3:32 PM  To: Faraz Phillip MD    Pt is requesting Chantix to be sent to her pharmacy for her. She states that it was supposed to be sent in during her appointment today.

## 2019-10-17 DIAGNOSIS — J44.9 CHRONIC OBSTRUCTIVE PULMONARY DISEASE, UNSPECIFIED COPD TYPE (HCC): ICD-10-CM

## 2019-10-18 DIAGNOSIS — J44.9 CHRONIC OBSTRUCTIVE PULMONARY DISEASE, UNSPECIFIED COPD TYPE (HCC): Primary | ICD-10-CM

## 2019-10-23 DIAGNOSIS — J44.9 CHRONIC OBSTRUCTIVE PULMONARY DISEASE, UNSPECIFIED COPD TYPE (HCC): Primary | ICD-10-CM

## 2019-10-28 DIAGNOSIS — J44.9 CHRONIC OBSTRUCTIVE PULMONARY DISEASE, UNSPECIFIED COPD TYPE (HCC): Primary | ICD-10-CM

## 2019-10-28 DIAGNOSIS — Z99.81 REQUIRES SUPPLEMENTAL OXYGEN: ICD-10-CM

## 2019-11-06 RX ORDER — ESCITALOPRAM OXALATE 20 MG/1
TABLET ORAL
Qty: 30 TABLET | OUTPATIENT
Start: 2019-11-06

## 2019-11-07 DIAGNOSIS — F31.32 BIPOLAR AFFECTIVE DISORDER, CURRENTLY DEPRESSED, MODERATE (HCC): ICD-10-CM

## 2019-11-07 RX ORDER — LISINOPRIL AND HYDROCHLOROTHIAZIDE 25; 20 MG/1; MG/1
1 TABLET ORAL DAILY
Qty: 30 TABLET | Refills: 0 | Status: SHIPPED | OUTPATIENT
Start: 2019-11-07 | End: 2019-12-13 | Stop reason: SDUPTHER

## 2019-11-07 RX ORDER — HYDROXYZINE HYDROCHLORIDE 25 MG/1
25 TABLET, FILM COATED ORAL DAILY
Qty: 30 TABLET | Refills: 0 | Status: SHIPPED | OUTPATIENT
Start: 2019-11-07 | End: 2019-12-13 | Stop reason: SDUPTHER

## 2019-11-13 ENCOUNTER — OFFICE VISIT (OUTPATIENT)
Dept: FAMILY MEDICINE CLINIC | Facility: CLINIC | Age: 54
End: 2019-11-13

## 2019-11-13 VITALS
WEIGHT: 192.6 LBS | BODY MASS INDEX: 35.44 KG/M2 | RESPIRATION RATE: 17 BRPM | HEIGHT: 62 IN | OXYGEN SATURATION: 96 % | DIASTOLIC BLOOD PRESSURE: 88 MMHG | TEMPERATURE: 97.6 F | SYSTOLIC BLOOD PRESSURE: 154 MMHG | HEART RATE: 92 BPM

## 2019-11-13 DIAGNOSIS — G62.9 NEUROPATHY: ICD-10-CM

## 2019-11-13 DIAGNOSIS — F31.32 BIPOLAR AFFECTIVE DISORDER, CURRENTLY DEPRESSED, MODERATE (HCC): Primary | ICD-10-CM

## 2019-11-13 DIAGNOSIS — M79.7 FIBROMYALGIA: ICD-10-CM

## 2019-11-13 PROCEDURE — 99214 OFFICE O/P EST MOD 30 MIN: CPT | Performed by: FAMILY MEDICINE

## 2019-11-13 RX ORDER — LAMOTRIGINE 100 MG/1
100 TABLET ORAL DAILY
Qty: 30 TABLET | Refills: 2 | Status: SHIPPED | OUTPATIENT
Start: 2019-11-13 | End: 2020-01-23 | Stop reason: SDUPTHER

## 2019-11-13 RX ORDER — GABAPENTIN 300 MG/1
300 CAPSULE ORAL 3 TIMES DAILY
Qty: 90 CAPSULE | Refills: 2 | Status: SHIPPED | OUTPATIENT
Start: 2019-11-13 | End: 2019-12-10 | Stop reason: SDUPTHER

## 2019-11-13 NOTE — PATIENT INSTRUCTIONS
Bipolar 1 Disorder  Bipolar 1 disorder is a mental health disorder in which a person has episodes of emotional highs (ben), and may also have episodes of emotional lows (depression) in addition to highs. Bipolar 1 disorder is different from other bipolar disorders because it involves extreme manic episodes. These episodes last at least one week or involve symptoms that are so severe that hospitalization is needed to keep the person safe.  What increases the risk?  The cause of this condition is not known. However, certain factors make you more likely to have bipolar disorder, such as:  · Having a family member with the disorder.  · An imbalance of certain chemicals in the brain (neurotransmitters).  · Stress, such as illness, financial problems, or a death.  · Certain conditions that affect the brain or spinal cord (neurologic conditions).  · Brain injury (trauma).  · Having another mental health disorder, such as:  ? Obsessive compulsive disorder.  ? Schizophrenia.  What are the signs or symptoms?  Symptoms of ben include:  · Very high self-esteem or self-confidence.  · Decreased need for sleep.  · Unusual talkativeness or feeling a need to keep talking. Speech may be very fast. It may seem like you cannot stop talking.  · Racing thoughts or constant talking, with quick shifts between topics that may or may not be related (flight of ideas).  · Decreased ability to focus or concentrate.  · Increased purposeful activity, such as work, studies, or social activity.  · Increased nonproductive activity. This could be pacing, squirming and fidgeting, or finger and toe tapping.  · Impulsive behavior and poor judgment. This may result in high-risk activities, such as having unprotected sex or spending a lot of money.  Symptoms of depression include:  · Feeling sad, hopeless, or helpless.  · Frequent or uncontrollable crying.  · Lack of feeling or caring about anything.  · Sleeping too much.  · Moving more slowly than  usual.  · Not being able to enjoy things you used to enjoy.  · Wanting to be alone all the time.  · Feeling guilty or worthless.  · Lack of energy or motivation.  · Trouble concentrating or remembering.  · Trouble making decisions.  · Increased appetite.  · Thoughts of death, or the desire to harm yourself.  Sometimes, you may have a mixed mood. This means having symptoms of depression and ben. Stress can make symptoms worse.  How is this diagnosed?  To diagnose bipolar disorder, your health care provider may ask about your:  · Emotional episodes.  · Medical history.  · Alcohol and drug use. This includes prescription medicines. Certain medical conditions and substances can cause symptoms that seem like bipolar disorder (secondary bipolar disorder).  How is this treated?  Bipolar disorder is a long-term (chronic) illness. It is best controlled with ongoing (continuous) treatment rather than treatment only when symptoms occur. Treatment may include:  · Medicine. Medicine can be prescribed by a provider who specializes in treating mental disorders (psychiatrist).  ? Medicines called mood stabilizers are usually prescribed.  ? If symptoms occur even while taking a mood stabilizer, other medicines may be added.  · Psychotherapy. Some forms of talk therapy, such as cognitive-behavioral therapy (CBT), can provide support, education, and guidance.  · Coping methods, such as journaling or relaxation exercises. These may include:  ? Yoga.  ? Meditation.  ? Deep breathing.  · Lifestyle changes, such as:  ? Limiting alcohol and drug use.  ? Exercising regularly.  ? Getting plenty of sleep.  ? Making healthy eating choices.    A combination of medicine, talk therapy, and coping methods is best. A procedure in which electricity is applied to the brain through the scalp (electroconvulsive therapy) may be used in cases of severe ben when medicine and psychotherapy work too slowly or do not work.  Follow these instructions at  home:  Activity    · Return to your normal activities as told by your health care provider.  · Find activities that you enjoy, and make time to do them.  · Exercise regularly as told by your health care provider.  Lifestyle  · Limit alcohol intake to no more than 1 drink a day for nonpregnant women and 2 drinks a day for men. One drink equals 12 oz of beer, 5 oz of wine, or 1½ oz of hard liquor.  · Follow a set schedule for eating and sleeping.  · Eat a balanced diet that includes fresh fruits and vegetables, whole grains, low-fat dairy, and lean meat.  · Get 7-8 hours of sleep each night.  General instructions  · Take over-the-counter and prescription medicines only as told by your health care provider.  · Think about joining a support group. Your health care provider may be able to recommend a support group.  · Talk with your family and loved ones about your treatment goals and how they can help.  · Keep all follow-up visits as told by your health care provider. This is important.  Where to find more information  For more information about bipolar disorder, visit the following websites:  · National [place] on Mental Illness: www.edgar.org  · [place] National [place] of Mental Health: www.nimh.nih.gov  Contact a health care provider if:  · Your symptoms get worse.  · You have side effects from your medicine, and they get worse.  · You have trouble sleeping.  · You have trouble doing daily activities.  · You feel unsafe in your surroundings.  · You are dealing with substance abuse.  Get help right away if:  · You have new symptoms.  · You have thoughts about harming yourself.  · You self-harm.  This information is not intended to replace advice given to you by your health care provider. Make sure you discuss any questions you have with your health care provider.  Document Released: 03/26/2002 Document Revised: 08/13/2017 Document Reviewed: 08/17/2017  Elsevier Interactive Patient Education © 2019 [employer].

## 2019-11-13 NOTE — PROGRESS NOTES
Subjective   Va Psator is a 54 y.o. female. Presents today for medication management for neuropathy and depression. Would like to be switched from Lyrica to Gabapentin due to insurance not covering Lyrica.     History of Present Illness     Patient is here today to discuss her neuropathy and depression.  She was originally taking Lyrica but it has since been discontinued from passport and so she needs to be switched to gabapentin.  She ran out of Lyrica last week.  She did a very good job helping with her neuropathy.  She has been very good about taking and controlled substances as prescribed, adhering to the med agreement, and her Roland reports and drug screens have always been very appropriate.  Regarding her depression/bipolar disorder she does really well with the lamotrigine and she is ready to move up to the 100 mg tab.  Denies any suicidal thoughts or homicidal thoughts.    The following portions of the patient's history were reviewed and updated as appropriate: allergies, current medications, past family history, past medical history, past social history, past surgical history and problem list.    Review of Systems   Musculoskeletal: Positive for back pain and neck pain.        Shoulder pain   All other systems reviewed and are negative.      Objective   Physical Exam   Constitutional: She appears well-developed and well-nourished. No distress.   Cardiovascular: Normal rate, regular rhythm and normal heart sounds. Exam reveals no gallop and no friction rub.   No murmur heard.  Pulmonary/Chest: Effort normal and breath sounds normal. She has no wheezes. She has no rales.   Skin: Skin is warm. Capillary refill takes less than 2 seconds. She is not diaphoretic.   Psychiatric: She has a normal mood and affect. Her speech is normal and behavior is normal. Judgment and thought content normal. Cognition and memory are normal. She is attentive.   Nursing note and vitals reviewed.      Assessment/Plan   Va was  seen today for med management.    Diagnoses and all orders for this visit:    Bipolar affective disorder, currently depressed, moderate (CMS/HCC)  -     lamoTRIgine (LAMICTAL) 100 MG tablet; Take 1 tablet by mouth Daily.    Fibromyalgia  -     gabapentin (NEURONTIN) 300 MG capsule; Take 1 capsule by mouth 3 (Three) Times a Day.    Neuropathy  -     gabapentin (NEURONTIN) 300 MG capsule; Take 1 capsule by mouth 3 (Three) Times a Day.      Will increase lamotrigine to 100mg daily and then d/c lyrica and start gabapentin 300mg TID in a step wise over 3 days.  Will consider increasing again after 4 weeks.

## 2019-11-14 ENCOUNTER — TELEPHONE (OUTPATIENT)
Dept: FAMILY MEDICINE CLINIC | Facility: CLINIC | Age: 54
End: 2019-11-14

## 2019-11-14 NOTE — TELEPHONE ENCOUNTER
Pt states that she just left [facility] for her oxygen and they want her to go see a Pulmonologist. Can you place referral for her?   She would like someone in [place], [place] if possible.

## 2019-11-14 NOTE — TELEPHONE ENCOUNTER
She has had 2 referrals placed for that this year alone.  1 by Gladys and 1 by myself.  She just needs to call and get it worked out.  Maybe she can talk to Melyssa.

## 2019-12-10 ENCOUNTER — OFFICE VISIT (OUTPATIENT)
Dept: FAMILY MEDICINE CLINIC | Facility: CLINIC | Age: 54
End: 2019-12-10

## 2019-12-10 VITALS
HEIGHT: 62 IN | BODY MASS INDEX: 34.78 KG/M2 | TEMPERATURE: 99.6 F | HEART RATE: 110 BPM | SYSTOLIC BLOOD PRESSURE: 176 MMHG | DIASTOLIC BLOOD PRESSURE: 88 MMHG | OXYGEN SATURATION: 96 % | RESPIRATION RATE: 16 BRPM | WEIGHT: 189 LBS

## 2019-12-10 DIAGNOSIS — F17.200 TOBACCO USE DISORDER: ICD-10-CM

## 2019-12-10 DIAGNOSIS — G62.9 NEUROPATHY: ICD-10-CM

## 2019-12-10 DIAGNOSIS — M79.7 FIBROMYALGIA: ICD-10-CM

## 2019-12-10 DIAGNOSIS — F31.9 BIPOLAR 1 DISORDER (HCC): Primary | ICD-10-CM

## 2019-12-10 PROCEDURE — 99214 OFFICE O/P EST MOD 30 MIN: CPT | Performed by: FAMILY MEDICINE

## 2019-12-10 RX ORDER — GABAPENTIN 300 MG/1
600 CAPSULE ORAL 3 TIMES DAILY
Qty: 180 CAPSULE | Refills: 2 | Status: SHIPPED | OUTPATIENT
Start: 2019-12-10 | End: 2020-03-03

## 2019-12-10 NOTE — PATIENT INSTRUCTIONS
Bipolar 1 Disorder  Bipolar 1 disorder is a mental health disorder in which a person has episodes of emotional highs (ben), and may also have episodes of emotional lows (depression) in addition to highs. Bipolar 1 disorder is different from other bipolar disorders because it involves extreme manic episodes. These episodes last at least one week or involve symptoms that are so severe that hospitalization is needed to keep the person safe.  What increases the risk?  The cause of this condition is not known. However, certain factors make you more likely to have bipolar disorder, such as:  · Having a family member with the disorder.  · An imbalance of certain chemicals in the brain (neurotransmitters).  · Stress, such as illness, financial problems, or a death.  · Certain conditions that affect the brain or spinal cord (neurologic conditions).  · Brain injury (trauma).  · Having another mental health disorder, such as:  ? Obsessive compulsive disorder.  ? Schizophrenia.  What are the signs or symptoms?  Symptoms of ben include:  · Very high self-esteem or self-confidence.  · Decreased need for sleep.  · Unusual talkativeness or feeling a need to keep talking. Speech may be very fast. It may seem like you cannot stop talking.  · Racing thoughts or constant talking, with quick shifts between topics that may or may not be related (flight of ideas).  · Decreased ability to focus or concentrate.  · Increased purposeful activity, such as work, studies, or social activity.  · Increased nonproductive activity. This could be pacing, squirming and fidgeting, or finger and toe tapping.  · Impulsive behavior and poor judgment. This may result in high-risk activities, such as having unprotected sex or spending a lot of money.  Symptoms of depression include:  · Feeling sad, hopeless, or helpless.  · Frequent or uncontrollable crying.  · Lack of feeling or caring about anything.  · Sleeping too much.  · Moving more slowly than  usual.  · Not being able to enjoy things you used to enjoy.  · Wanting to be alone all the time.  · Feeling guilty or worthless.  · Lack of energy or motivation.  · Trouble concentrating or remembering.  · Trouble making decisions.  · Increased appetite.  · Thoughts of death, or the desire to harm yourself.  Sometimes, you may have a mixed mood. This means having symptoms of depression and ben. Stress can make symptoms worse.  How is this diagnosed?  To diagnose bipolar disorder, your health care provider may ask about your:  · Emotional episodes.  · Medical history.  · Alcohol and drug use. This includes prescription medicines. Certain medical conditions and substances can cause symptoms that seem like bipolar disorder (secondary bipolar disorder).  How is this treated?  Bipolar disorder is a long-term (chronic) illness. It is best controlled with ongoing (continuous) treatment rather than treatment only when symptoms occur. Treatment may include:  · Medicine. Medicine can be prescribed by a provider who specializes in treating mental disorders (psychiatrist).  ? Medicines called mood stabilizers are usually prescribed.  ? If symptoms occur even while taking a mood stabilizer, other medicines may be added.  · Psychotherapy. Some forms of talk therapy, such as cognitive-behavioral therapy (CBT), can provide support, education, and guidance.  · Coping methods, such as journaling or relaxation exercises. These may include:  ? Yoga.  ? Meditation.  ? Deep breathing.  · Lifestyle changes, such as:  ? Limiting alcohol and drug use.  ? Exercising regularly.  ? Getting plenty of sleep.  ? Making healthy eating choices.    A combination of medicine, talk therapy, and coping methods is best. A procedure in which electricity is applied to the brain through the scalp (electroconvulsive therapy) may be used in cases of severe ben when medicine and psychotherapy work too slowly or do not work.  Follow these instructions at  home:  Activity    · Return to your normal activities as told by your health care provider.  · Find activities that you enjoy, and make time to do them.  · Exercise regularly as told by your health care provider.  Lifestyle  · Limit alcohol intake to no more than 1 drink a day for nonpregnant women and 2 drinks a day for men. One drink equals 12 oz of beer, 5 oz of wine, or 1½ oz of hard liquor.  · Follow a set schedule for eating and sleeping.  · Eat a balanced diet that includes fresh fruits and vegetables, whole grains, low-fat dairy, and lean meat.  · Get 7-8 hours of sleep each night.  General instructions  · Take over-the-counter and prescription medicines only as told by your health care provider.  · Think about joining a support group. Your health care provider may be able to recommend a support group.  · Talk with your family and loved ones about your treatment goals and how they can help.  · Keep all follow-up visits as told by your health care provider. This is important.  Where to find more information  For more information about bipolar disorder, visit the following websites:  · National [place] on Mental Illness: www.edgar.org  · U.[place] National [place] of Mental Health: www.nimh.nih.gov  Contact a health care provider if:  · Your symptoms get worse.  · You have side effects from your medicine, and they get worse.  · You have trouble sleeping.  · You have trouble doing daily activities.  · You feel unsafe in your surroundings.  · You are dealing with substance abuse.  Get help right away if:  · You have new symptoms.  · You have thoughts about harming yourself.  · You self-harm.  This information is not intended to replace advice given to you by your health care provider. Make sure you discuss any questions you have with your health care provider.  Document Released: 03/26/2002 Document Revised: 08/13/2017 Document Reviewed: 08/17/2017  Elsevier Interactive Patient Education © 2019 [employer]vier Inc.

## 2019-12-10 NOTE — PROGRESS NOTES
Subjective   Va Pastor is a 54 y.o. female. Presents today for follow up on bipolar disorder, neuropathy and depression. Patient would also like to discuss smoking cessation.     History of Present Illness     Neuropathy/Fibromyalgia -patient was started on gabapentin 300 mg 3 times a day after stopping the Lyrica.  Would like to increase the gabapentin if possible.  She says she did get some relief from it but feels like she needs quite a bit more.  She did not have any side effects such as confusion, loss of memory, ataxia.    Bipolar disorder/depression -stable.  Patient was  increased to lamotrigine 100 mg daily at last visit.  She says she is doing very well and that her mood is been very stable.  She is very happy with the dose as it is a does not want to change anything.    Tobacco use disorder-  Would like to start on Chantix.  Smoked for many years at a pack per day.  She did well with Chantix in the past and that is the reason why she is using it a second time.    The following portions of the patient's history were reviewed and updated as appropriate: allergies, current medications, past family history, past medical history, past social history, past surgical history and problem list.    Review of Systems   Constitutional: Negative for fatigue and fever.   Respiratory: Negative for shortness of breath and wheezing.    Cardiovascular: Negative for chest pain and palpitations.   Gastrointestinal: Negative for constipation and nausea.   All other systems reviewed and are negative.      Objective   Physical Exam   Constitutional: She appears well-developed and well-nourished. No distress.   Cardiovascular: Normal rate, regular rhythm and normal heart sounds. Exam reveals no gallop and no friction rub.   No murmur heard.  Pulmonary/Chest: Effort normal and breath sounds normal. She has no wheezes. She has no rales.   Abdominal: Soft. Bowel sounds are normal. She exhibits no distension. There is no  tenderness.   Skin: Skin is warm. Capillary refill takes less than 2 seconds. She is not diaphoretic.   Psychiatric: She has a normal mood and affect. Her speech is normal and behavior is normal. Judgment and thought content normal. Cognition and memory are normal. She is attentive.   Nursing note and vitals reviewed.      Assessment/Plan   Va was seen today for follow-up and nicotine dependence.    Diagnoses and all orders for this visit:    Bipolar 1 disorder (CMS/[facility])    Tobacco use disorder  -     varenicline (CHANTIX STARTING MONTH PAK) 0.5 MG X 11 & 1 MG X 42 tablet; Take 0.5 mg one daily on days 1-3 and and 0.5 mg twice daily on days 4-7.Then 1 mg twice daily for a total of 12 weeks.    Fibromyalgia  -     gabapentin (NEURONTIN) 300 MG capsule; Take 2 capsules by mouth 3 (Three) Times a Day.    Neuropathy  -     gabapentin (NEURONTIN) 300 MG capsule; Take 2 capsules by mouth 3 (Three) Times a Day.      Bipolar disorder appears to be stable.  We will continue lamotrigine as is.  Tobacco use disorder I started the Chantix and she will call back in a month and let me know when she is getting low and I will change her to the maintenance pack.  For the fibromyalgia/neuropathy I increased her Neurontin to 2 capsules 3 times a day however I asked her to increase slowly over the course of about a week to ensure that she is not having side effects.  Plan on seeing her back in about a month.

## 2019-12-13 DIAGNOSIS — F31.32 BIPOLAR AFFECTIVE DISORDER, CURRENTLY DEPRESSED, MODERATE (HCC): ICD-10-CM

## 2019-12-13 DIAGNOSIS — M19.90 GENERALIZED ARTHRITIS: ICD-10-CM

## 2019-12-13 RX ORDER — LISINOPRIL AND HYDROCHLOROTHIAZIDE 25; 20 MG/1; MG/1
1 TABLET ORAL DAILY
Qty: 30 TABLET | Refills: 1 | Status: SHIPPED | OUTPATIENT
Start: 2019-12-13 | End: 2021-06-08

## 2019-12-13 RX ORDER — SERTRALINE HYDROCHLORIDE 100 MG/1
TABLET, FILM COATED ORAL
Qty: 45 TABLET | Refills: 3 | Status: SHIPPED | OUTPATIENT
Start: 2019-12-13 | End: 2020-03-03 | Stop reason: SDUPTHER

## 2019-12-13 RX ORDER — MELOXICAM 15 MG/1
15 TABLET ORAL DAILY
Qty: 30 TABLET | Refills: 3 | Status: SHIPPED | OUTPATIENT
Start: 2019-12-13 | End: 2020-03-03 | Stop reason: SDUPTHER

## 2019-12-13 RX ORDER — HYDROXYZINE HYDROCHLORIDE 25 MG/1
25 TABLET, FILM COATED ORAL DAILY
Qty: 30 TABLET | Refills: 1 | Status: SHIPPED | OUTPATIENT
Start: 2019-12-13 | End: 2020-05-07 | Stop reason: SDUPTHER

## 2019-12-24 ENCOUNTER — OFFICE VISIT (OUTPATIENT)
Dept: FAMILY MEDICINE CLINIC | Facility: CLINIC | Age: 54
End: 2019-12-24

## 2019-12-24 VITALS
WEIGHT: 188.4 LBS | HEART RATE: 109 BPM | HEIGHT: 62 IN | RESPIRATION RATE: 16 BRPM | TEMPERATURE: 98 F | BODY MASS INDEX: 34.67 KG/M2 | DIASTOLIC BLOOD PRESSURE: 84 MMHG | SYSTOLIC BLOOD PRESSURE: 128 MMHG | OXYGEN SATURATION: 94 %

## 2019-12-24 DIAGNOSIS — J18.9 COMMUNITY ACQUIRED PNEUMONIA, UNSPECIFIED LATERALITY: Primary | ICD-10-CM

## 2019-12-24 DIAGNOSIS — R06.02 SHORTNESS OF BREATH: ICD-10-CM

## 2019-12-24 LAB
EXPIRATION DATE: NORMAL
FLUAV AG NPH QL: NEGATIVE
FLUBV AG NPH QL: NEGATIVE
INTERNAL CONTROL: NORMAL
Lab: NORMAL

## 2019-12-24 PROCEDURE — 99214 OFFICE O/P EST MOD 30 MIN: CPT | Performed by: FAMILY MEDICINE

## 2019-12-24 PROCEDURE — 87804 INFLUENZA ASSAY W/OPTIC: CPT | Performed by: FAMILY MEDICINE

## 2019-12-24 PROCEDURE — 71046 X-RAY EXAM CHEST 2 VIEWS: CPT | Performed by: FAMILY MEDICINE

## 2019-12-24 RX ORDER — PREDNISONE 50 MG/1
50 TABLET ORAL DAILY
Qty: 5 TABLET | Refills: 0 | Status: SHIPPED | OUTPATIENT
Start: 2019-12-24 | End: 2020-04-06

## 2019-12-24 RX ORDER — DOXYCYCLINE HYCLATE 100 MG/1
100 TABLET, DELAYED RELEASE ORAL 2 TIMES DAILY
Qty: 10 TABLET | Refills: 0 | Status: SHIPPED | OUTPATIENT
Start: 2019-12-24 | End: 2019-12-29

## 2019-12-24 NOTE — PATIENT INSTRUCTIONS
Chronic Obstructive Pulmonary Disease  Chronic obstructive pulmonary disease (COPD) is a long-term (chronic) lung problem. When you have COPD, it is hard for air to get in and out of your lungs. Usually the condition gets worse over time, and your lungs will never return to normal. There are things you can do to keep yourself as healthy as possible.  · Your doctor may treat your condition with:  ? Medicines.  ? Oxygen.  ? Lung surgery.  · Your doctor may also recommend:  ? Rehabilitation. This includes steps to make your body work better. It may involve a team of specialists.  ? Quitting smoking, if you smoke.  ? Exercise and changes to your diet.  ? Comfort measures (palliative care).  Follow these instructions at home:  Medicines  · Take over-the-counter and prescription medicines only as told by your doctor.  · Talk to your doctor before taking any cough or allergy medicines. You may need to avoid medicines that cause your lungs to be dry.  Lifestyle  · If you smoke, stop. Smoking makes the problem worse. If you need help quitting, ask your doctor.  · Avoid being around things that make your breathing worse. This may include smoke, chemicals, and fumes.  · Stay active, but remember to rest as well.  · Learn and use tips on how to relax.  · Make sure you get enough sleep. Most adults need at least 7 hours of sleep every night.  · Eat healthy foods. Eat smaller meals more often. Rest before meals.  Controlled breathing  Learn and use tips on how to control your breathing as told by your doctor. Try:  · Breathing in (inhaling) through your nose for 1 second. Then, pucker your lips and breath out (exhale) through your lips for 2 seconds.  · Putting one hand on your belly (abdomen). Breathe in slowly through your nose for 1 second. Your hand on your belly should move out. Pucker your lips and breathe out slowly through your lips. Your hand on your belly should move in as you breathe out.    Controlled coughing  Learn  and use controlled coughing to clear mucus from your lungs. Follow these steps:  1. Lean your head a little forward.  2. Breathe in deeply.  3. Try to hold your breath for 3 seconds.  4. Keep your mouth slightly open while coughing 2 times.  5. Spit any mucus out into a tissue.  6. Rest and do the steps again 1 or 2 times as needed.  General instructions  · Make sure you get all the shots (vaccines) that your doctor recommends. Ask your doctor about a flu shot and a pneumonia shot.  · Use oxygen therapy and pulmonary rehabilitation if told by your doctor. If you need home oxygen therapy, ask your doctor if you should buy a tool to measure your oxygen level (oximeter).  · Make a COPD action plan with your doctor. This helps you to know what to do if you feel worse than usual.  · Manage any other conditions you have as told by your doctor.  · Avoid going outside when it is very hot, cold, or humid.  · Avoid people who have a sickness you can catch (contagious).  · Keep all follow-up visits as told by your doctor. This is important.  Contact a doctor if:  · You cough up more mucus than usual.  · There is a change in the color or thickness of the mucus.  · It is harder to breathe than usual.  · Your breathing is faster than usual.  · You have trouble sleeping.  · You need to use your medicines more often than usual.  · You have trouble doing your normal activities such as getting dressed or walking around the house.  Get help right away if:  · You have shortness of breath while resting.  · You have shortness of breath that stops you from:  ? Being able to talk.  ? Doing normal activities.  · Your chest hurts for longer than 5 minutes.  · Your skin color is more blue than usual.  · Your pulse oximeter shows that you have low oxygen for longer than 5 minutes.  · You have a fever.  · You feel too tired to breathe normally.  Summary  · Chronic obstructive pulmonary disease (COPD) is a long-term lung problem.  · The way your  lungs work will never return to normal. Usually the condition gets worse over time. There are things you can do to keep yourself as healthy as possible.  · Take over-the-counter and prescription medicines only as told by your doctor.  · If you smoke, stop. Smoking makes the problem worse.  This information is not intended to replace advice given to you by your health care provider. Make sure you discuss any questions you have with your health care provider.  Document Released: 06/05/2009 Document Revised: 01/22/2018 Document Reviewed: 01/22/2018  [employer] Interactive Patient Education © 2019 [employer]

## 2019-12-24 NOTE — PROGRESS NOTES
Subjective   Va Pastor is a 54 y.o. female. Presents today to be evaluated for headache, weakness, dizziness, and SOA.     History of Present Illness     Patient is here today to be evaluated for headache, weakness, dizziness and shortness of breath.  She has a known history of COPD and tends to have an exacerbation when she gets a upper or lower respiratory infection.  She is taking her inhalers as prescribed which is the Stiolto and Atrovent.  She says she ran out of theophylline a few weeks ago and was not sure if she was post to continue taking it.  She says that she is not smoking anymore as of 4 days ago with the Chantix.    The following portions of the patient's history were reviewed and updated as appropriate: allergies, current medications, past family history, past medical history, past social history, past surgical history and problem list.    Review of Systems   Constitutional: Positive for chills and fatigue.   Respiratory: Positive for shortness of breath.    Neurological: Positive for dizziness, weakness and headaches.   All other systems reviewed and are negative.      Objective   Physical Exam   Constitutional: No distress.   Ill-appearing but not toxic   HENT:   Right Ear: Hearing, tympanic membrane, external ear and ear canal normal.   Left Ear: Hearing, tympanic membrane, external ear and ear canal normal.   Nose: Nose normal. Right sinus exhibits no maxillary sinus tenderness and no frontal sinus tenderness. Left sinus exhibits no maxillary sinus tenderness and no frontal sinus tenderness.   Mouth/Throat: Uvula is midline, oropharynx is clear and moist and mucous membranes are normal.   Eyes: Conjunctivae are normal. Right eye exhibits no discharge. Left eye exhibits no discharge.   Neck: Neck supple.   Cardiovascular: Normal rate, regular rhythm and normal heart sounds. Exam reveals no gallop and no friction rub.   No murmur heard.  Pulmonary/Chest: Effort normal and breath sounds normal.  She has no wheezes. She has no rales.   Lymphadenopathy:     She has no cervical adenopathy.   Skin: She is not diaphoretic.   Nursing note and vitals reviewed.  Chest XR: Possible pneumonia versus atelectasis    Assessment/Plan   Va was seen today for headache, shortness of breath, fatigue and dizziness.    Diagnoses and all orders for this visit:    Community acquired pneumonia, unspecified laterality  -     doxycycline (DORYX) 100 MG enteric coated tablet; Take 1 tablet by mouth 2 (Two) Times a Day for 5 days.  -     predniSONE (DELTASONE) 50 MG tablet; Take 1 tablet by mouth Daily.    Shortness of breath  -     XR Chest PA & Lateral (In Office)  -     POCT Influenza A/B  -     doxycycline (DORYX) 100 MG enteric coated tablet; Take 1 tablet by mouth 2 (Two) Times a Day for 5 days.  -     predniSONE (DELTASONE) 50 MG tablet; Take 1 tablet by mouth Daily.      Start the patient on doxycycline for the presumed pneumonia as well as prednisone for the likely COPD exacerbation.  Flu test was negative.  I want her to follow-up with me next week to make sure this is passing.  She says she is on be staying with her sister and she is good about letting her know if her bipolar disorder gets out of hand with the steroids.  If her family calls for manic behavior, she is best suited to go to the ER for treatment.

## 2020-01-06 ENCOUNTER — TELEPHONE (OUTPATIENT)
Dept: FAMILY MEDICINE CLINIC | Facility: CLINIC | Age: 55
End: 2020-01-06

## 2020-01-06 NOTE — TELEPHONE ENCOUNTER
[place] a ref[place] request from [facility] for Buspirone HCL 15 mg tabs Sig- take 1 tablet by mouth two times a day for anxiety. I don't see where you have filled this for her before. Okay to refill?

## 2020-01-07 DIAGNOSIS — F41.9 ANXIETY AND DEPRESSION: Primary | ICD-10-CM

## 2020-01-07 DIAGNOSIS — F32.A ANXIETY AND DEPRESSION: Primary | ICD-10-CM

## 2020-01-07 RX ORDER — BUSPIRONE HYDROCHLORIDE 15 MG/1
15 TABLET ORAL 2 TIMES DAILY
Qty: 60 TABLET | Refills: 0 | Status: SHIPPED | OUTPATIENT
Start: 2020-01-07 | End: 2020-06-01 | Stop reason: SDUPTHER

## 2020-01-22 ENCOUNTER — TELEPHONE (OUTPATIENT)
Dept: FAMILY MEDICINE CLINIC | Facility: CLINIC | Age: 55
End: 2020-01-22

## 2020-01-22 NOTE — TELEPHONE ENCOUNTER
----- Message from Nayeli Woody sent at 1/22/2020 12:38 PM EST -----  Regarding: LAMOTRIGINE  Contact: [phone]  Patient has requested a refill of medication be done at [facility] in [place] per Vandana at the pharmacy.  They will need new order

## 2020-01-23 DIAGNOSIS — M81.0 AGE-RELATED OSTEOPOROSIS WITHOUT CURRENT PATHOLOGICAL FRACTURE: ICD-10-CM

## 2020-01-23 DIAGNOSIS — F31.32 BIPOLAR AFFECTIVE DISORDER, CURRENTLY DEPRESSED, MODERATE (HCC): ICD-10-CM

## 2020-01-23 RX ORDER — LAMOTRIGINE 100 MG/1
100 TABLET ORAL DAILY
Qty: 30 TABLET | Refills: 0 | Status: SHIPPED | OUTPATIENT
Start: 2020-01-23 | End: 2020-05-07 | Stop reason: SDUPTHER

## 2020-01-23 RX ORDER — ALENDRONATE SODIUM 70 MG/1
70 TABLET ORAL
Qty: 4 TABLET | Refills: 0 | Status: SHIPPED | OUTPATIENT
Start: 2020-01-23 | End: 2020-05-07 | Stop reason: SDUPTHER

## 2020-01-23 NOTE — TELEPHONE ENCOUNTER
Medication is on hold at [facility]. Will await for Dr. Phillip to return on 1/30/2020.

## 2020-01-23 NOTE — TELEPHONE ENCOUNTER
If last fill date was 1/6/2020, she is not due until 2/5/2020. Please request medication refill from Dr Phillip upon his return 1/30/2020. I would prefer he refill medication, as he is most familiar with this patient and treats her regularly.

## 2020-01-23 NOTE — TELEPHONE ENCOUNTER
[facility]/2020.  Medication was accidentally sent this morning to [place] pharmacy from paper refill request in Dr. Phillip mailbox. I have called pharmacy and had them put medication on hold until you confirm otherwise.

## 2020-01-29 ENCOUNTER — TRANSCRIBE ORDERS (OUTPATIENT)
Dept: SLEEP MEDICINE | Facility: HOSPITAL | Age: 55
End: 2020-01-29

## 2020-01-29 ENCOUNTER — TRANSCRIBE ORDERS (OUTPATIENT)
Dept: ADMINISTRATIVE | Facility: HOSPITAL | Age: 55
End: 2020-01-29

## 2020-01-29 DIAGNOSIS — J18.9 PNEUMONIA DUE TO INFECTIOUS ORGANISM, UNSPECIFIED LATERALITY, UNSPECIFIED PART OF LUNG: Primary | ICD-10-CM

## 2020-02-06 ENCOUNTER — TELEPHONE (OUTPATIENT)
Dept: FAMILY MEDICINE CLINIC | Facility: CLINIC | Age: 55
End: 2020-02-06

## 2020-02-06 DIAGNOSIS — J30.89 ENVIRONMENTAL AND SEASONAL ALLERGIES: ICD-10-CM

## 2020-02-06 RX ORDER — CETIRIZINE HYDROCHLORIDE 10 MG/1
10 TABLET ORAL DAILY
Qty: 90 TABLET | Refills: 3 | Status: SHIPPED | OUTPATIENT
Start: 2020-02-06 | End: 2020-06-24 | Stop reason: SDUPTHER

## 2020-02-06 NOTE — TELEPHONE ENCOUNTER
----- Message from Maricel Lunsford MA sent at 2/6/2020  1:01 PM EST -----  Regarding: FW: MEDICATION  Contact: [phone]  Okay to refill Chantix? Previously prescribed the starting pack  ----- Message -----  From: Nayeli Woody  Sent: 2/6/2020  12:44 PM EST  To: [facility]  Subject: MEDICATION                                       PATIENT NEEDS CHANTIX AND ZYRTEC REFILLED AT [facility] IN [place]

## 2020-02-06 NOTE — TELEPHONE ENCOUNTER
Yes that is fine.  If she is in need of the starter pack again I am not sure if insurance will cover it again but you can try.  If she is due for the maintenance pack that is just fine with me for 3 refills.

## 2020-02-13 RX ORDER — VARENICLINE TARTRATE 1 MG/1
1 TABLET, FILM COATED ORAL DAILY
Qty: 90 TABLET | Refills: 1 | Status: SHIPPED | OUTPATIENT
Start: 2020-02-13 | End: 2020-06-01 | Stop reason: SDUPTHER

## 2020-02-13 RX ORDER — LEVOTHYROXINE SODIUM 0.03 MG/1
25 TABLET ORAL DAILY
Qty: 90 TABLET | Refills: 1 | Status: SHIPPED | OUTPATIENT
Start: 2020-02-13 | End: 2020-06-24 | Stop reason: SDUPTHER

## 2020-02-13 RX ORDER — VARENICLINE TARTRATE 1 MG/1
1 TABLET, FILM COATED ORAL DAILY
Qty: 90 TABLET | Refills: 1 | Status: SHIPPED | OUTPATIENT
Start: 2020-02-13 | End: 2020-02-13

## 2020-02-13 RX ORDER — LEVOTHYROXINE SODIUM 0.03 MG/1
25 TABLET ORAL DAILY
Qty: 90 TABLET | Refills: 1 | Status: SHIPPED | OUTPATIENT
Start: 2020-02-13 | End: 2020-02-13

## 2020-02-13 NOTE — TELEPHONE ENCOUNTER
Niranjantcb.   We have been trying to reach pt for several days. Okay to send a letter for her to call us?

## 2020-03-03 ENCOUNTER — TELEPHONE (OUTPATIENT)
Dept: FAMILY MEDICINE CLINIC | Facility: CLINIC | Age: 55
End: 2020-03-03

## 2020-03-03 DIAGNOSIS — J44.9 CHRONIC OBSTRUCTIVE PULMONARY DISEASE, UNSPECIFIED COPD TYPE (HCC): ICD-10-CM

## 2020-03-03 DIAGNOSIS — G62.9 NEUROPATHY: ICD-10-CM

## 2020-03-03 DIAGNOSIS — M19.90 GENERALIZED ARTHRITIS: ICD-10-CM

## 2020-03-03 DIAGNOSIS — M79.7 FIBROMYALGIA: ICD-10-CM

## 2020-03-03 RX ORDER — POTASSIUM CHLORIDE 750 MG/1
10 TABLET, FILM COATED, EXTENDED RELEASE ORAL 2 TIMES DAILY
Qty: 60 TABLET | Refills: 0 | Status: SHIPPED | OUTPATIENT
Start: 2020-03-03 | End: 2020-03-27 | Stop reason: SDUPTHER

## 2020-03-03 RX ORDER — SERTRALINE HYDROCHLORIDE 100 MG/1
150 TABLET, FILM COATED ORAL DAILY
Qty: 45 TABLET | Refills: 0 | Status: SHIPPED | OUTPATIENT
Start: 2020-03-03 | End: 2020-11-05 | Stop reason: SDUPTHER

## 2020-03-03 RX ORDER — MELOXICAM 15 MG/1
15 TABLET ORAL DAILY
Qty: 30 TABLET | Refills: 0 | Status: SHIPPED | OUTPATIENT
Start: 2020-03-03 | End: 2020-04-22

## 2020-03-03 RX ORDER — FLUTICASONE PROPIONATE 50 MCG
1 SPRAY, SUSPENSION (ML) NASAL DAILY
Qty: 16 G | Refills: 0 | Status: SHIPPED | OUTPATIENT
Start: 2020-03-03 | End: 2020-03-27 | Stop reason: SDUPTHER

## 2020-03-03 RX ORDER — GABAPENTIN 300 MG/1
600 CAPSULE ORAL 3 TIMES DAILY
Qty: 180 CAPSULE | Refills: 5 | Status: SHIPPED | OUTPATIENT
Start: 2020-03-03 | End: 2020-05-13 | Stop reason: SDUPTHER

## 2020-03-03 RX ORDER — GABAPENTIN 300 MG/1
600 CAPSULE ORAL 3 TIMES DAILY
Qty: 180 CAPSULE | Refills: 5 | Status: SHIPPED | OUTPATIENT
Start: 2020-03-03 | End: 2020-03-03 | Stop reason: SDUPTHER

## 2020-03-03 NOTE — TELEPHONE ENCOUNTER
Received refill request from [facility] for Gabapentin 300 mg.    Med agreement done 11/13/2019.  Roland in your folder for review.  UDS from another provider on 8/25/2019 but done by us 10/29/2018.  Med check done 12/10/2019.    Okay to refill?

## 2020-03-03 NOTE — TELEPHONE ENCOUNTER
So the good news is I sent that over to [facility] as the patient requested.  The bad news [facility].

## 2020-03-19 ENCOUNTER — TELEPHONE (OUTPATIENT)
Dept: FAMILY MEDICINE CLINIC | Facility: CLINIC | Age: 55
End: 2020-03-19

## 2020-03-19 NOTE — TELEPHONE ENCOUNTER
----- Message from Bernadette Escobedo MA sent at 3/19/2020  3:46 PM EDT -----  Regarding: FW: Prescription Question  Contact: [phone]      ----- Message -----  From: Va Pastor  Sent: 3/19/2020   3:39 PM EDT  To: Bradley [facility]  Subject: Prescription Question                            I can't take the Meloxicam because it interferes with my breathing is there something else that I can take that can be prescribed to me and sent to my drugstore. If not what other alternatives do I have I really am having some achy joints and pains right now and I really need something.  I am not going to make an appointment and come down there at this time I'm a little leery of this virus. Please let me know something soon  
In order to possibly give you something different for your achy pain, I would need to see you as I have not given it to you before.  I could see you tomorrow morning.  We are reserving the first few hours of each day for only well people and after we had sanitized everything.  So if you want to make an appointment and come in tomorrow between 7 and 9 you would not be with sick people.  
Patient aware via mychart.  
92

## 2020-03-21 DIAGNOSIS — G25.81 RLS (RESTLESS LEGS SYNDROME): ICD-10-CM

## 2020-03-23 RX ORDER — ROPINIROLE 0.25 MG/1
TABLET, FILM COATED ORAL
Qty: 60 TABLET | Refills: 3 | Status: SHIPPED | OUTPATIENT
Start: 2020-03-23 | End: 2020-06-01 | Stop reason: SDUPTHER

## 2020-03-25 ENCOUNTER — TELEMEDICINE (OUTPATIENT)
Dept: FAMILY MEDICINE CLINIC | Facility: CLINIC | Age: 55
End: 2020-03-25

## 2020-03-25 DIAGNOSIS — J44.9 CHRONIC OBSTRUCTIVE PULMONARY DISEASE, UNSPECIFIED COPD TYPE (HCC): ICD-10-CM

## 2020-03-25 DIAGNOSIS — J40 BRONCHITIS: ICD-10-CM

## 2020-03-25 DIAGNOSIS — J06.9 ACUTE URI: Primary | ICD-10-CM

## 2020-03-25 PROCEDURE — 99213 OFFICE O/P EST LOW 20 MIN: CPT | Performed by: PHYSICIAN ASSISTANT

## 2020-03-25 RX ORDER — DOXYCYCLINE 100 MG/1
100 CAPSULE ORAL 2 TIMES DAILY
Qty: 20 CAPSULE | Refills: 0 | Status: SHIPPED | OUTPATIENT
Start: 2020-03-25 | End: 2020-11-17

## 2020-03-25 RX ORDER — GUAIFENESIN AND DEXTROMETHORPHAN HYDROBROMIDE 600; 30 MG/1; MG/1
1 TABLET, EXTENDED RELEASE ORAL 2 TIMES DAILY
Qty: 45 TABLET | Refills: 0 | Status: SHIPPED | OUTPATIENT
Start: 2020-03-25 | End: 2021-06-08

## 2020-03-25 NOTE — PROGRESS NOTES
"Kia Pastor is a 54 y.o. female who is being evaluated today by video visit for congestion, sneezing, drainage, and coughing.     History of Present Illness     Started 4 days ago with sneezing, nasal congestion, PND, coughing- productive clear/ yellow/green/thick and thin, itchy throat, ear drainage with ear itching and itching all over. Can't sleep. No fever, vomiting, and diarrhea    Using nebulizer \"a whole lot\"- 2-3 x daily and getting up in the night-got up at 4 am this morning. Reports this is not unusual with COPD and asthma. It is a little worse but is not sure. Taking Zyrtec for many years. Also on hydroxyzine, flonase. Does not have any mucinex. Reports spiriva and breo works better but trelegy. Has seen pulmonology only once and was to get CT chest but has not had the scan yet.     The following portions of the patient's history were reviewed and updated as appropriate: allergies, current medications, past family history, past medical history, past social history, past surgical history and problem list.    Review of Systems   Constitutional: Positive for fatigue. Negative for fever.   HENT: Positive for congestion, postnasal drip and rhinorrhea. Negative for ear pain ( Ear itching but no pain) and sore throat ( Itchy throat but no sore throat).    Respiratory: Positive for cough and shortness of breath.    Cardiovascular: Negative.    Gastrointestinal: Negative.    Skin:        Itching   Psychiatric/Behavioral: Positive for sleep disturbance.       Objective     Physical Exam   Constitutional: She appears well-developed and well-nourished. No distress.   HENT:   Head: Normocephalic and atraumatic.   Eyes: Right eye exhibits no discharge. Left eye exhibits no discharge.   Pulmonary/Chest: Effort normal. No accessory muscle usage. No respiratory distress.   Patient was initially laying flat in bed and had no shortness of breath or respiratory difficulties.  She did end up sitting up through " "the visit and also had no visible respiratory distress.   Skin: Skin is dry.   Psychiatric: Her speech is normal and behavior is normal. Cognition and memory are normal. She is attentive.       Assessment/Plan   Diagnoses and all orders for this visit:    Acute URI  -     doxycycline (MONODOX) 100 MG capsule; Take 1 capsule by mouth 2 (Two) Times a Day.  -     guaifenesin-dextromethorphan (MUCINEX DM)  MG tablet sustained-release 12 hour tablet; Take 1 tablet by mouth 2 (Two) Times a Day.  -     QUESTIONNAIRE SERIES    Bronchitis  -     doxycycline (MONODOX) 100 MG capsule; Take 1 capsule by mouth 2 (Two) Times a Day.  -     guaifenesin-dextromethorphan (MUCINEX DM)  MG tablet sustained-release 12 hour tablet; Take 1 tablet by mouth 2 (Two) Times a Day.  -     QUESTIONNAIRE SERIES    Chronic obstructive pulmonary disease, unspecified COPD type (CMS/HCC)  -     doxycycline (MONODOX) 100 MG capsule; Take 1 capsule by mouth 2 (Two) Times a Day.  -     guaifenesin-dextromethorphan (MUCINEX DM)  MG tablet sustained-release 12 hour tablet; Take 1 tablet by mouth 2 (Two) Times a Day.  -     QUESTIONNAIRE SERIES    Assessment and plan  54-year-old female who is being evaluated today by video visit for congestion, sneezing, drainage, and coughing.  She reports symptoms started 4 days ago with sneezing, nasal congestion, PND, coughing- productive clear/ yellow/green/thick and thin, itchy throat, ear drainage with ear itching, itching all over, and sleep disturbance.  She denies fever, vomiting, and diarrhea.  She has been taking Zyrtec for many years and continues with hydroxyzine as needed and flonase daily which helps with some of the nasal symptoms.  She does not have any mucinex and is not currently taking this medication.  She is using her nebulizer \"a whole lot\"- 2-3 x daily and getting up in the night-got up at 4 am this morning.  She reports this is not unusual with COPD and asthma. It is a little " worse but not significantly worse.  Patient reports spiriva and breo works better but she is currently on Trelegy and has albuterol and ipratropium for her nebulizer.  She was seen by pulmonology a month or so ago only once and was to get CT chest but has not had the scan yet.  She has follow-up with them next month.    Patient was advised to continue all pulmonology medications as directed.  She will keep follow-up next month but should contact them to let them know about her symptoms as well as for clarification on inhalers, to let them know about her inhaler preference with control of symptoms, and have any refills as needed.  She should use inhalers and nebulizers as directed by them. She will continue Zyrtec 10 mg daily, Flonase 2 sprays each nostril daily, and she uses hydroxyzine as needed for itching.  I will add Mucinex DM twice daily and doxycycline 100 mg twice daily for 10 days to treat empirically for possible COPD exacerbation.  She was advised to self quarantine for 14 days and will be given directions and questionnaires for this.  She was advised if she has any worsening respiratory symptoms, develops a fever or any systemic symptoms including weakness, dizziness, or other concerns, new or changing symptoms, she will need to be seen at the urgent care or ER ASAP.  Patient verbalized understanding and agreement with plan and recommendations and will definitely seek care at urgent care or ER if she has no improvement, worsening, new or changing symptoms.

## 2020-03-25 NOTE — PATIENT INSTRUCTIONS
Patient was advised to continue all pulmonology medications as directed.  She has been seen by pulmonology once and has follow-up next month.  She should use inhalers and nebulizers as directed by them.  I asked that she call them to ensure she is taking correctly and on the correct medications.  She will also get any refills or changes from them.  She will continue Zyrtec 10 mg daily, Flonase 2 sprays each nostril daily, and she uses hydroxyzine as needed for itching.  I will add Mucinex DM twice daily and doxycycline 100 mg twice daily for 10 days to treat empirically for possible COPD exacerbation.  She was advised to self quarantine for 14 days and will be given directions and questionnaires for this.  She was advised if she has any worsening respiratory symptoms, develops a fever or any systemic symptoms including weakness, dizziness, or other concerns, new or changing symptoms, she will need to be seen at the urgent care or ER ASAP.  Patient verbalized understanding and agreement with plan and recommendations and will definitely seek care at urgent care or ER if she has no improvement, worsening, new or changing symptoms.

## 2020-03-27 DIAGNOSIS — J44.9 CHRONIC OBSTRUCTIVE PULMONARY DISEASE, UNSPECIFIED COPD TYPE (HCC): ICD-10-CM

## 2020-03-27 RX ORDER — ALBUTEROL SULFATE 90 UG/1
2 AEROSOL, METERED RESPIRATORY (INHALATION) EVERY 4 HOURS PRN
Qty: 18 G | Refills: 2 | Status: SHIPPED | OUTPATIENT
Start: 2020-03-27 | End: 2020-05-07 | Stop reason: SDUPTHER

## 2020-03-27 RX ORDER — POTASSIUM CHLORIDE 750 MG/1
10 TABLET, FILM COATED, EXTENDED RELEASE ORAL 2 TIMES DAILY
Qty: 60 TABLET | Refills: 0 | Status: SHIPPED | OUTPATIENT
Start: 2020-03-27 | End: 2020-06-01 | Stop reason: SDUPTHER

## 2020-03-27 RX ORDER — FLUTICASONE PROPIONATE 50 MCG
1 SPRAY, SUSPENSION (ML) NASAL DAILY
Qty: 16 G | Refills: 0 | Status: SHIPPED | OUTPATIENT
Start: 2020-03-27 | End: 2020-06-01 | Stop reason: SDUPTHER

## 2020-04-06 ENCOUNTER — APPOINTMENT (OUTPATIENT)
Dept: CT IMAGING | Facility: HOSPITAL | Age: 55
End: 2020-04-06

## 2020-04-06 ENCOUNTER — OFFICE VISIT (OUTPATIENT)
Dept: FAMILY MEDICINE CLINIC | Facility: CLINIC | Age: 55
End: 2020-04-06

## 2020-04-06 DIAGNOSIS — J44.9 CHRONIC OBSTRUCTIVE PULMONARY DISEASE, UNSPECIFIED COPD TYPE (HCC): Primary | ICD-10-CM

## 2020-04-06 DIAGNOSIS — J30.89 ENVIRONMENTAL AND SEASONAL ALLERGIES: ICD-10-CM

## 2020-04-06 DIAGNOSIS — G47.00 INSOMNIA, UNSPECIFIED TYPE: ICD-10-CM

## 2020-04-06 PROCEDURE — 99214 OFFICE O/P EST MOD 30 MIN: CPT | Performed by: FAMILY MEDICINE

## 2020-04-06 RX ORDER — ZOLPIDEM TARTRATE 5 MG/1
5 TABLET ORAL NIGHTLY PRN
Qty: 15 TABLET | Refills: 0 | Status: SHIPPED | OUTPATIENT
Start: 2020-04-06 | End: 2020-05-13 | Stop reason: SDUPTHER

## 2020-04-06 RX ORDER — IPRATROPIUM BROMIDE AND ALBUTEROL SULFATE 2.5; .5 MG/3ML; MG/3ML
3 SOLUTION RESPIRATORY (INHALATION) EVERY 4 HOURS PRN
Qty: 360 ML | Refills: 5 | Status: SHIPPED | OUTPATIENT
Start: 2020-04-06 | End: 2020-05-07 | Stop reason: SDUPTHER

## 2020-04-06 RX ORDER — MONTELUKAST SODIUM 10 MG/1
10 TABLET ORAL NIGHTLY
Qty: 30 TABLET | Refills: 11 | Status: SHIPPED | OUTPATIENT
Start: 2020-04-06 | End: 2020-05-13 | Stop reason: SDUPTHER

## 2020-04-06 RX ORDER — ERGOCALCIFEROL 1.25 MG/1
50000 CAPSULE ORAL WEEKLY
Qty: 12 CAPSULE | Refills: 1 | Status: SHIPPED | OUTPATIENT
Start: 2020-04-06 | End: 2020-05-07 | Stop reason: SDUPTHER

## 2020-04-06 NOTE — PROGRESS NOTES
Subjective   Va Pastor is a 54 y.o. female.  Presents via telephone visit for insomnia, COPD, seasonal allergies.    History of Present Illness      Insomnia-patient says since being cooped up from the Covid 19 epidemic that she is started to lose sleep and has not been able to keep her sleep very well.  She is asking for some to help with that.  Denies any alcohol use.  She has a history of bipolar disorder and she is taking all of her medication as prescribed.  She is working with getting a bipolar psychiatrist.  She has done well with Ambien in the past.    COPD-unstable.  Patient says that she is very uncontrolled because Stiolto has not been very helpful.  She has tried Breo and a rescue inhaler in the past and failed all of those.  She also failed theophylline in the past.  She is interested in a new inhaler that will work better for her.  She is not against the idea of going to a pulmonologist but she is afraid to leave her house due to the epidemic.  Endorses coughing throughout the day as well as shortness of breath.  She has coughing at night.    Seasonal allergies-uncontrolled.  Patient is taking Zyrtec but says that she still having itchy eyes watery nose, congestion, cough.  Would like to know if there is a but else that she could try.  Also on Flonase nasal spray.    The following portions of the patient's history were reviewed and updated as appropriate: allergies, current medications, past family history, past medical history, past social history, past surgical history and problem list.    Review of Systems   Constitutional: Negative for activity change, appetite change, fatigue and fever.   HENT: Negative for ear pain, facial swelling and sore throat.    Eyes: Negative for discharge and itching.   Respiratory: Positive for cough. Negative for chest tightness and shortness of breath.    Cardiovascular: Negative for chest pain and palpitations.   Gastrointestinal: Negative for abdominal distention  and constipation.   Endocrine: Negative for polydipsia, polyphagia and polyuria.   Genitourinary: Negative for difficulty urinating and flank pain.   Musculoskeletal: Negative for arthralgias and back pain.   Skin: Negative for color change, rash and wound.   Allergic/Immunologic: Negative for environmental allergies and food allergies.   Neurological: Negative for weakness and numbness.   Hematological: Negative for adenopathy. Does not bruise/bleed easily.   Psychiatric/Behavioral: Positive for sleep disturbance. Negative for decreased concentration and dysphoric mood. The patient is nervous/anxious.        Objective   Physical Exam   N/A    Assessment/Plan   Diagnoses and all orders for this visit:    Chronic obstructive pulmonary disease, unspecified COPD type (CMS/[facility])  -     ipratropium-albuterol (DUO-NEB) 0.5-2.5 mg/3 ml nebulizer; Take 3 mL by nebulization Every 4 (Four) Hours As Needed for Wheezing or Shortness of Air.  -     Fluticasone-Umeclidin-Vilant (Trelegy Ellipta) 100-62.5-25 MCG/INH aerosol powder ; Inhale 1 puff Daily.  -     montelukast (Singulair) 10 MG tablet; Take 1 tablet by mouth Every Night.    Environmental and seasonal allergies  -     montelukast (Singulair) 10 MG tablet; Take 1 tablet by mouth Every Night.    Insomnia, unspecified type  -     zolpidem (AMBIEN) 5 MG tablet; Take 1 tablet by mouth At Night As Needed for Sleep.      I switched out her rescue inhaler to them Pratropium-albuterol nebulizer.  We will try to get Trelegy paid for to replace the Stiolto.  We will also start Singulair for COPD and seasonal allergies.  Regarding her sleep, I said I really do not feel comfortable adding any more antidepressants or anything for her sleep based on what she is on.  I said I can give her a limited supply of zolpidem to see if she likes it.  If she does she could mention it to the psychiatrist when she gets established.  Follow-up in about 3 months.    Spent approximately 15 minutes on  the phone with additional 15 minutes with research and putting in orders and completing the encounter.

## 2020-04-06 NOTE — PATIENT INSTRUCTIONS
Chronic Obstructive Pulmonary Disease  Chronic obstructive pulmonary disease (COPD) is a long-term (chronic) lung problem. When you have COPD, it is hard for air to get in and out of your lungs. Usually the condition gets worse over time, and your lungs will never return to normal. There are things you can do to keep yourself as healthy as possible.  · Your doctor may treat your condition with:  ? Medicines.  ? Oxygen.  ? Lung surgery.  · Your doctor may also recommend:  ? Rehabilitation. This includes steps to make your body work better. It may involve a team of specialists.  ? Quitting smoking, if you smoke.  ? Exercise and changes to your diet.  ? Comfort measures (palliative care).  Follow these instructions at home:  Medicines  · Take over-the-counter and prescription medicines only as told by your doctor.  · Talk to your doctor before taking any cough or allergy medicines. You may need to avoid medicines that cause your lungs to be dry.  Lifestyle  · If you smoke, stop. Smoking makes the problem worse. If you need help quitting, ask your doctor.  · Avoid being around things that make your breathing worse. This may include smoke, chemicals, and fumes.  · Stay active, but remember to rest as well.  · Learn and use tips on how to relax.  · Make sure you get enough sleep. Most adults need at least 7 hours of sleep every night.  · Eat healthy foods. Eat smaller meals more often. Rest before meals.  Controlled breathing  Learn and use tips on how to control your breathing as told by your doctor. Try:  · Breathing in (inhaling) through your nose for 1 second. Then, pucker your lips and breath out (exhale) through your lips for 2 seconds.  · Putting one hand on your belly (abdomen). Breathe in slowly through your nose for 1 second. Your hand on your belly should move out. Pucker your lips and breathe out slowly through your lips. Your hand on your belly should move in as you breathe out.    Controlled coughing  Learn  and use controlled coughing to clear mucus from your lungs. Follow these steps:  1. Lean your head a little forward.  2. Breathe in deeply.  3. Try to hold your breath for 3 seconds.  4. Keep your mouth slightly open while coughing 2 times.  5. Spit any mucus out into a tissue.  6. Rest and do the steps again 1 or 2 times as needed.  General instructions  · Make sure you get all the shots (vaccines) that your doctor recommends. Ask your doctor about a flu shot and a pneumonia shot.  · Use oxygen therapy and pulmonary rehabilitation if told by your doctor. If you need home oxygen therapy, ask your doctor if you should buy a tool to measure your oxygen level (oximeter).  · Make a COPD action plan with your doctor. This helps you to know what to do if you feel worse than usual.  · Manage any other conditions you have as told by your doctor.  · Avoid going outside when it is very hot, cold, or humid.  · Avoid people who have a sickness you can catch (contagious).  · Keep all follow-up visits as told by your doctor. This is important.  Contact a doctor if:  · You cough up more mucus than usual.  · There is a change in the color or thickness of the mucus.  · It is harder to breathe than usual.  · Your breathing is faster than usual.  · You have trouble sleeping.  · You need to use your medicines more often than usual.  · You have trouble doing your normal activities such as getting dressed or walking around the house.  Get help right away if:  · You have shortness of breath while resting.  · You have shortness of breath that stops you from:  ? Being able to talk.  ? Doing normal activities.  · Your chest hurts for longer than 5 minutes.  · Your skin color is more blue than usual.  · Your pulse oximeter shows that you have low oxygen for longer than 5 minutes.  · You have a fever.  · You feel too tired to breathe normally.  Summary  · Chronic obstructive pulmonary disease (COPD) is a long-term lung problem.  · The way your  lungs work will never return to normal. Usually the condition gets worse over time. There are things you can do to keep yourself as healthy as possible.  · Take over-the-counter and prescription medicines only as told by your doctor.  · If you smoke, stop. Smoking makes the problem worse.  This information is not intended to replace advice given to you by your health care provider. Make sure you discuss any questions you have with your health care provider.  Document Released: 06/05/2009 Document Revised: 01/22/2018 Document Reviewed: 01/22/2018  [employer] Interactive Patient Education © 2020 [employer]

## 2020-04-16 DIAGNOSIS — K21.9 GASTROESOPHAGEAL REFLUX DISEASE, ESOPHAGITIS PRESENCE NOT SPECIFIED: Primary | ICD-10-CM

## 2020-04-16 RX ORDER — FAMOTIDINE 10 MG
10 TABLET ORAL 2 TIMES DAILY
Qty: 60 TABLET | Refills: 0 | Status: SHIPPED | OUTPATIENT
Start: 2020-04-16 | End: 2020-06-01 | Stop reason: SDUPTHER

## 2020-04-21 ENCOUNTER — TELEPHONE (OUTPATIENT)
Dept: FAMILY MEDICINE CLINIC | Facility: CLINIC | Age: 55
End: 2020-04-21

## 2020-04-21 NOTE — TELEPHONE ENCOUNTER
PATIENT CALLED IN AND STATED SHE IS IN A LOT OF PAIN AND REFUSED TO GO TO THE HOSPITAL OR ER AND WANTS TO KNOW IF SHE COULD BE PRESCRIBED ACETAMINOPHEN AND A MUSCLE LAB IF COVERED MY PASSPORT . [facility]ACY [facility] [address] [facility] . PATIENT CALL BACK [phone].

## 2020-04-22 ENCOUNTER — OFFICE VISIT (OUTPATIENT)
Dept: FAMILY MEDICINE CLINIC | Facility: CLINIC | Age: 55
End: 2020-04-22

## 2020-04-22 ENCOUNTER — TELEPHONE (OUTPATIENT)
Dept: FAMILY MEDICINE CLINIC | Facility: CLINIC | Age: 55
End: 2020-04-22

## 2020-04-22 DIAGNOSIS — M54.9 ACUTE UPPER BACK PAIN: Primary | ICD-10-CM

## 2020-04-22 PROCEDURE — 99213 OFFICE O/P EST LOW 20 MIN: CPT | Performed by: FAMILY MEDICINE

## 2020-04-22 RX ORDER — ACETAMINOPHEN 500 MG
1000 TABLET ORAL EVERY 8 HOURS PRN
Qty: 120 TABLET | Refills: 1 | Status: SHIPPED | OUTPATIENT
Start: 2020-04-22 | End: 2021-06-08

## 2020-04-22 NOTE — PROGRESS NOTES
Subjective   Va Pastor is a 55 y.o. female. Presents via telephone visit for upper back pain.    You have chosen to receive care through a telephone visit. Do you consent to use a telephone visit for your medical care today? Yes      History of Present Illness     Acute upper back pain - New problem.  Patient was reaching up into a tall cabinet and felt a twinge in her upper back.  She says it is painful to move most directions with her back.  She has tried some tylenol but ran out.  Needs a new prescription.  Also wondering about possible muscle rubs that [employer] would cover.        The following portions of the patient's history were reviewed and updated as appropriate: allergies, current medications, past family history, past medical history, past social history, past surgical history and problem list.    Review of Systems   Musculoskeletal: Positive for arthralgias and back pain.       Objective   Physical Exam   N/A    Assessment/Plan   Diagnoses and all orders for this visit:    Acute upper back pain  -     acetaminophen (TYLENOL) 500 MG tablet; Take 2 tablets by mouth Every 8 (Eight) Hours As Needed for Mild Pain .  -     Lidocaine 4 % gel; Apply 1 g topically 2 (Two) Times a Day As Needed (muscle pain).      Plan as above.  Follow-up PRN.    I spent 10 minutes on the call and another 5 minutes completing the encounter along with research.

## 2020-04-22 NOTE — PATIENT INSTRUCTIONS
Acute Back Pain, Adult  Acute back pain is sudden and usually short-lived. It is often caused by an injury to the muscles and tissues in the back. The injury may result from:  · A muscle or ligament getting overstretched or torn (strained). Ligaments are tissues that connect bones to each other. Lifting something improperly can cause a back strain.  · Wear and tear (degeneration) of the spinal disks. Spinal disks are circular tissue that provides cushioning between the bones of the spine (vertebrae).  · Twisting motions, such as while playing sports or doing yard work.  · A hit to the back.  · Arthritis.  You may have a physical exam, lab tests, and imaging tests to find the cause of your pain. Acute back pain usually goes away with rest and home care.  Follow these instructions at home:  Managing pain, stiffness, and swelling  · Take over-the-counter and prescription medicines only as told by your health care provider.  · Your health care provider may recommend applying ice during the first 24-48 hours after your pain starts. To do this:  ? Put ice in a plastic bag.  ? Place a towel between your skin and the bag.  ? Leave the ice on for 20 minutes, 2-3 times a day.  · If directed, apply heat to the affected area as often as told by your health care provider. Use the heat source that your health care provider recommends, such as a moist heat pack or a heating pad.  ? Place a towel between your skin and the heat source.  ? Leave the heat on for 20-30 minutes.  ? Remove the heat if your skin turns bright red. This is especially important if you are unable to feel pain, heat, or cold. You have a greater risk of getting burned.  Activity    · Do not stay in bed. Staying in bed for more than 1-2 days can delay your recovery.  · Sit up and stand up straight. Avoid leaning forward when you sit, or hunching over when you stand.  ? If you work at a desk, sit close to it so you do not need to lean over. Keep your chin tucked  "in. Keep your neck drawn back, and keep your elbows bent at a right angle. Your arms should look like the letter \"L.\"  ? Sit high and close to the steering wheel when you drive. Add lower back (lumbar) support to your car seat, if needed.  · Take short walks on even surfaces as soon as you are able. Try to increase the length of time you walk each day.  · Do not sit, drive, or  one place for more than 30 minutes at a time. Sitting or standing for long periods of time can put stress on your back.  · Do not drive or use heavy machinery while taking prescription pain medicine.  · Use proper lifting techniques. When you bend and lift, use positions that put less stress on your back:  ? Bend your knees.  ? Keep the load close to your body.  ? Avoid twisting.  · Exercise regularly as told by your health care provider. Exercising helps your back heal faster and helps prevent back injuries by keeping muscles strong and flexible.  · Work with a physical therapist to make a safe exercise program, as recommended by your health care provider. Do any exercises as told by your physical therapist.  Lifestyle  · Maintain a healthy weight. Extra weight puts stress on your back and makes it difficult to have good posture.  · Avoid activities or situations that make you feel anxious or stressed. Stress and anxiety increase muscle tension and can make back pain worse. Learn ways to manage anxiety and stress, such as through exercise.  General instructions  · Sleep on a firm mattress in a comfortable position. Try lying on your side with your knees slightly bent. If you lie on your back, put a pillow under your knees.  · Follow your treatment plan as told by your health care provider. This may include:  ? Cognitive or behavioral therapy.  ? Acupuncture or massage therapy.  ? Meditation or yoga.  Contact a health care provider if:  · You have pain that is not relieved with rest or medicine.  · You have increasing pain going down " into your legs or buttocks.  · Your pain does not improve after 2 weeks.  · You have pain at night.  · You lose weight without trying.  · You have a fever or chills.  Get help right away if:  · You develop new bowel or bladder control problems.  · You have unusual weakness or numbness in your arms or legs.  · You develop nausea or vomiting.  · You develop abdominal pain.  · You feel faint.  Summary  · Acute back pain is sudden and usually short-lived.  · Use proper lifting techniques. When you bend and lift, use positions that put less stress on your back.  · Take over-the-counter and prescription medicines and apply heat or ice as directed by your health care provider.  This information is not intended to replace advice given to you by your health care provider. Make sure you discuss any questions you have with your health care provider.  Document Released: 12/18/2006 Document Revised: 07/25/2019 Document Reviewed: 08/01/2018  [employer] Interactive Patient Education © 2020 [employer]

## 2020-04-23 ENCOUNTER — TELEPHONE (OUTPATIENT)
Dept: FAMILY MEDICINE CLINIC | Facility: CLINIC | Age: 55
End: 2020-04-23

## 2020-04-23 NOTE — TELEPHONE ENCOUNTER
PT CALLED STATING THE MEDICATION THAT WAS PRESCRIBED YESTERDAY NEEDS  PA AT THE PHARMACY BEFORE ABLE TO FILL.     [facility] CONFIRMED    PT CALL BACK   [phone]

## 2020-04-24 ENCOUNTER — TELEPHONE (OUTPATIENT)
Dept: FAMILY MEDICINE CLINIC | Facility: CLINIC | Age: 55
End: 2020-04-24

## 2020-04-24 DIAGNOSIS — M54.9 ACUTE UPPER BACK PAIN: Primary | ICD-10-CM

## 2020-04-24 RX ORDER — BACLOFEN 10 MG/1
10 TABLET ORAL 3 TIMES DAILY
Qty: 60 TABLET | Refills: 2 | Status: SHIPPED | OUTPATIENT
Start: 2020-04-24 | End: 2020-06-06 | Stop reason: SDUPTHER

## 2020-04-24 NOTE — TELEPHONE ENCOUNTER
PATIENT CALLED IN TO FOLLOW UP ON THIS MATTER AND STATED SHE NEEDS SOMETHING FOR SLEEP SHE IS IN A LOT OF PAIN . PATIENT CALL BACK [phone].  PLEASE ADVISE

## 2020-04-24 NOTE — TELEPHONE ENCOUNTER
Annmarie Phillip,   Please see notes.  I did a PA on her Tylenol (which will most likely be denied) but still waiting on response from insurance. She is wanting an alternate medication since insurance won't cover it. Please advise, thanks.

## 2020-04-24 NOTE — TELEPHONE ENCOUNTER
I called the patient and we are going to try some baclofen for her back.  She is not sure why they put that up there about her sleep.  She says the Ambien is helping her a lot and she is happy with it.  So we will start with the baclofen and see if that does better.

## 2020-04-24 NOTE — TELEPHONE ENCOUNTER
PATIENT STATES THAT SHE HAS SPOKEN WITH HER INSURANCE COMPANY AND THEY TOLD HER THAT THEY WILL NOT PAY FOR THE ACETAMINOPHEN 500 MG TABLET AND SHE IS OKAY WITH WHATEVER IT IS THAT DR DOLL IS WILLING TO CALL IN BESIDES A NARCOTIC DRUG. THE PATIENT ALSO SUGGESTED MAYBE GETTING A DICLOFENAC SODIUM GEL CALLED INTO HER PHARM THAT ON FILE. PLEASE ADVISE AND CALL PT BACK [phone].

## 2020-05-07 ENCOUNTER — APPOINTMENT (OUTPATIENT)
Dept: CT IMAGING | Facility: HOSPITAL | Age: 55
End: 2020-05-07

## 2020-05-07 DIAGNOSIS — M81.0 AGE-RELATED OSTEOPOROSIS WITHOUT CURRENT PATHOLOGICAL FRACTURE: ICD-10-CM

## 2020-05-07 DIAGNOSIS — F31.32 BIPOLAR AFFECTIVE DISORDER, CURRENTLY DEPRESSED, MODERATE (HCC): ICD-10-CM

## 2020-05-07 DIAGNOSIS — J44.9 CHRONIC OBSTRUCTIVE PULMONARY DISEASE, UNSPECIFIED COPD TYPE (HCC): ICD-10-CM

## 2020-05-07 RX ORDER — LAMOTRIGINE 100 MG/1
100 TABLET ORAL DAILY
Qty: 30 TABLET | Refills: 2 | Status: SHIPPED | OUTPATIENT
Start: 2020-05-07 | End: 2020-06-08 | Stop reason: SDUPTHER

## 2020-05-07 RX ORDER — IPRATROPIUM BROMIDE AND ALBUTEROL SULFATE 2.5; .5 MG/3ML; MG/3ML
3 SOLUTION RESPIRATORY (INHALATION) EVERY 4 HOURS PRN
Qty: 360 ML | Refills: 2 | Status: SHIPPED | OUTPATIENT
Start: 2020-05-07 | End: 2020-06-01 | Stop reason: SDUPTHER

## 2020-05-07 RX ORDER — ALBUTEROL SULFATE 90 UG/1
2 AEROSOL, METERED RESPIRATORY (INHALATION) EVERY 4 HOURS PRN
Qty: 18 G | Refills: 2 | Status: SHIPPED | OUTPATIENT
Start: 2020-05-07 | End: 2020-05-13 | Stop reason: SDUPTHER

## 2020-05-07 RX ORDER — ERGOCALCIFEROL 1.25 MG/1
50000 CAPSULE ORAL WEEKLY
Qty: 4 CAPSULE | Refills: 0 | Status: SHIPPED | OUTPATIENT
Start: 2020-05-07 | End: 2020-06-01 | Stop reason: SDUPTHER

## 2020-05-07 RX ORDER — ALENDRONATE SODIUM 70 MG/1
70 TABLET ORAL
Qty: 4 TABLET | Refills: 2 | Status: SHIPPED | OUTPATIENT
Start: 2020-05-07 | End: 2020-06-24 | Stop reason: SDUPTHER

## 2020-05-07 RX ORDER — HYDROXYZINE HYDROCHLORIDE 25 MG/1
25 TABLET, FILM COATED ORAL DAILY
Qty: 30 TABLET | Refills: 2 | Status: SHIPPED | OUTPATIENT
Start: 2020-05-07 | End: 2020-11-10 | Stop reason: SDUPTHER

## 2020-05-13 DIAGNOSIS — J44.9 CHRONIC OBSTRUCTIVE PULMONARY DISEASE, UNSPECIFIED COPD TYPE (HCC): ICD-10-CM

## 2020-05-13 DIAGNOSIS — M54.9 ACUTE UPPER BACK PAIN: ICD-10-CM

## 2020-05-13 DIAGNOSIS — M79.7 FIBROMYALGIA: ICD-10-CM

## 2020-05-13 DIAGNOSIS — J30.89 ENVIRONMENTAL AND SEASONAL ALLERGIES: ICD-10-CM

## 2020-05-13 DIAGNOSIS — G62.9 NEUROPATHY: ICD-10-CM

## 2020-05-13 DIAGNOSIS — G47.00 INSOMNIA, UNSPECIFIED TYPE: ICD-10-CM

## 2020-05-14 RX ORDER — ZOLPIDEM TARTRATE 5 MG/1
5 TABLET ORAL NIGHTLY PRN
Qty: 30 TABLET | Refills: 5 | Status: SHIPPED | OUTPATIENT
Start: 2020-05-14 | End: 2020-06-24 | Stop reason: SDUPTHER

## 2020-05-14 RX ORDER — GABAPENTIN 300 MG/1
600 CAPSULE ORAL 3 TIMES DAILY
Qty: 180 CAPSULE | Refills: 5 | Status: SHIPPED | OUTPATIENT
Start: 2020-05-14 | End: 2020-09-08

## 2020-05-14 RX ORDER — ALBUTEROL SULFATE 90 UG/1
2 AEROSOL, METERED RESPIRATORY (INHALATION) EVERY 4 HOURS PRN
Qty: 18 G | Refills: 2 | Status: SHIPPED | OUTPATIENT
Start: 2020-05-14 | End: 2020-06-01 | Stop reason: SDUPTHER

## 2020-05-14 RX ORDER — MONTELUKAST SODIUM 10 MG/1
10 TABLET ORAL NIGHTLY
Qty: 30 TABLET | Refills: 2 | Status: SHIPPED | OUTPATIENT
Start: 2020-05-14 | End: 2020-06-24 | Stop reason: SDUPTHER

## 2020-05-14 NOTE — TELEPHONE ENCOUNTER
Med agreement for gabapentin done 11/13/2019.  Med agreement for lyrica done 1/25/2019.  Roland done 3/3/2020.  UDS done 8/25/2019.  Office visit done 4/6/2020.    Okay to refill?

## 2020-05-28 ENCOUNTER — TRANSCRIBE ORDERS (OUTPATIENT)
Dept: SLEEP MEDICINE | Facility: HOSPITAL | Age: 55
End: 2020-05-28

## 2020-05-28 DIAGNOSIS — G25.81 RLS (RESTLESS LEGS SYNDROME): ICD-10-CM

## 2020-05-28 DIAGNOSIS — G47.33 OSA (OBSTRUCTIVE SLEEP APNEA): ICD-10-CM

## 2020-05-28 DIAGNOSIS — J44.9 CHRONIC OBSTRUCTIVE PULMONARY DISEASE, UNSPECIFIED COPD TYPE (HCC): Primary | ICD-10-CM

## 2020-05-29 ENCOUNTER — TRANSCRIBE ORDERS (OUTPATIENT)
Dept: SLEEP MEDICINE | Facility: HOSPITAL | Age: 55
End: 2020-05-29

## 2020-05-29 DIAGNOSIS — Z01.818 OTHER SPECIFIED PRE-OPERATIVE EXAMINATION: Primary | ICD-10-CM

## 2020-06-01 DIAGNOSIS — G25.81 RLS (RESTLESS LEGS SYNDROME): ICD-10-CM

## 2020-06-01 DIAGNOSIS — F41.9 ANXIETY AND DEPRESSION: ICD-10-CM

## 2020-06-01 DIAGNOSIS — J44.9 CHRONIC OBSTRUCTIVE PULMONARY DISEASE, UNSPECIFIED COPD TYPE (HCC): ICD-10-CM

## 2020-06-01 DIAGNOSIS — K21.9 GASTROESOPHAGEAL REFLUX DISEASE, ESOPHAGITIS PRESENCE NOT SPECIFIED: ICD-10-CM

## 2020-06-01 DIAGNOSIS — F32.A ANXIETY AND DEPRESSION: ICD-10-CM

## 2020-06-01 RX ORDER — FLUTICASONE PROPIONATE 50 MCG
1 SPRAY, SUSPENSION (ML) NASAL DAILY
Qty: 16 G | Refills: 1 | Status: SHIPPED | OUTPATIENT
Start: 2020-06-01 | End: 2020-09-11 | Stop reason: SDUPTHER

## 2020-06-01 RX ORDER — FAMOTIDINE 10 MG
10 TABLET ORAL 2 TIMES DAILY
Qty: 60 TABLET | Refills: 1 | Status: SHIPPED | OUTPATIENT
Start: 2020-06-01 | End: 2020-06-04

## 2020-06-01 RX ORDER — IPRATROPIUM BROMIDE AND ALBUTEROL SULFATE 2.5; .5 MG/3ML; MG/3ML
3 SOLUTION RESPIRATORY (INHALATION) EVERY 4 HOURS PRN
Qty: 360 ML | Refills: 1 | Status: SHIPPED | OUTPATIENT
Start: 2020-06-01 | End: 2020-09-11 | Stop reason: SDUPTHER

## 2020-06-01 RX ORDER — ALBUTEROL SULFATE 90 UG/1
2 AEROSOL, METERED RESPIRATORY (INHALATION) EVERY 4 HOURS PRN
Qty: 18 G | Refills: 1 | Status: SHIPPED | OUTPATIENT
Start: 2020-06-01 | End: 2020-06-24 | Stop reason: SDUPTHER

## 2020-06-02 RX ORDER — ROPINIROLE 0.25 MG/1
TABLET, FILM COATED ORAL
Qty: 60 TABLET | Refills: 3 | Status: SHIPPED | OUTPATIENT
Start: 2020-06-02 | End: 2020-09-11 | Stop reason: SDUPTHER

## 2020-06-02 RX ORDER — VARENICLINE TARTRATE 1 MG/1
1 TABLET, FILM COATED ORAL DAILY
Qty: 90 TABLET | Refills: 1 | Status: SHIPPED | OUTPATIENT
Start: 2020-06-02 | End: 2020-11-17

## 2020-06-02 RX ORDER — ERGOCALCIFEROL 1.25 MG/1
50000 CAPSULE ORAL WEEKLY
Qty: 4 CAPSULE | Refills: 0 | Status: SHIPPED | OUTPATIENT
Start: 2020-06-02 | End: 2021-01-11 | Stop reason: SDUPTHER

## 2020-06-02 RX ORDER — BUSPIRONE HYDROCHLORIDE 15 MG/1
15 TABLET ORAL 2 TIMES DAILY
Qty: 60 TABLET | Refills: 2 | Status: SHIPPED | OUTPATIENT
Start: 2020-06-02 | End: 2020-08-06 | Stop reason: SDUPTHER

## 2020-06-02 RX ORDER — POTASSIUM CHLORIDE 750 MG/1
10 TABLET, FILM COATED, EXTENDED RELEASE ORAL 2 TIMES DAILY
Qty: 60 TABLET | Refills: 2 | Status: SHIPPED | OUTPATIENT
Start: 2020-06-02 | End: 2020-08-06 | Stop reason: SDUPTHER

## 2020-06-04 ENCOUNTER — APPOINTMENT (OUTPATIENT)
Dept: SLEEP MEDICINE | Facility: HOSPITAL | Age: 55
End: 2020-06-04

## 2020-06-04 DIAGNOSIS — K21.9 GASTROESOPHAGEAL REFLUX DISEASE, ESOPHAGITIS PRESENCE NOT SPECIFIED: Primary | ICD-10-CM

## 2020-06-04 RX ORDER — PANTOPRAZOLE SODIUM 20 MG/1
20 TABLET, DELAYED RELEASE ORAL DAILY
Qty: 30 TABLET | Refills: 11 | Status: SHIPPED | OUTPATIENT
Start: 2020-06-04 | End: 2021-02-08 | Stop reason: SDUPTHER

## 2020-06-06 DIAGNOSIS — M54.9 ACUTE UPPER BACK PAIN: ICD-10-CM

## 2020-06-08 DIAGNOSIS — F31.32 BIPOLAR AFFECTIVE DISORDER, CURRENTLY DEPRESSED, MODERATE (HCC): ICD-10-CM

## 2020-06-08 RX ORDER — LAMOTRIGINE 100 MG/1
100 TABLET ORAL DAILY
Qty: 30 TABLET | Refills: 0 | Status: SHIPPED | OUTPATIENT
Start: 2020-06-08 | End: 2020-06-26

## 2020-06-08 RX ORDER — BACLOFEN 10 MG/1
10 TABLET ORAL 3 TIMES DAILY PRN
Qty: 60 TABLET | Refills: 2 | Status: SHIPPED | OUTPATIENT
Start: 2020-06-08 | End: 2020-09-25 | Stop reason: SDUPTHER

## 2020-06-24 DIAGNOSIS — M81.0 AGE-RELATED OSTEOPOROSIS WITHOUT CURRENT PATHOLOGICAL FRACTURE: ICD-10-CM

## 2020-06-24 DIAGNOSIS — J30.89 ENVIRONMENTAL AND SEASONAL ALLERGIES: ICD-10-CM

## 2020-06-24 DIAGNOSIS — J44.9 CHRONIC OBSTRUCTIVE PULMONARY DISEASE, UNSPECIFIED COPD TYPE (HCC): ICD-10-CM

## 2020-06-24 DIAGNOSIS — G47.00 INSOMNIA, UNSPECIFIED TYPE: ICD-10-CM

## 2020-06-25 RX ORDER — ALBUTEROL SULFATE 90 UG/1
2 AEROSOL, METERED RESPIRATORY (INHALATION) EVERY 4 HOURS PRN
Qty: 18 G | Refills: 0 | Status: SHIPPED | OUTPATIENT
Start: 2020-06-25 | End: 2020-07-21 | Stop reason: SDUPTHER

## 2020-06-25 RX ORDER — ZOLPIDEM TARTRATE 5 MG/1
5 TABLET ORAL NIGHTLY PRN
Qty: 30 TABLET | Refills: 1 | Status: SHIPPED | OUTPATIENT
Start: 2020-06-25 | End: 2021-06-08

## 2020-06-25 RX ORDER — CETIRIZINE HYDROCHLORIDE 10 MG/1
10 TABLET ORAL DAILY
Qty: 90 TABLET | Refills: 0 | Status: SHIPPED | OUTPATIENT
Start: 2020-06-25 | End: 2020-06-26

## 2020-06-25 RX ORDER — MONTELUKAST SODIUM 10 MG/1
10 TABLET ORAL NIGHTLY
Qty: 90 TABLET | Refills: 0 | Status: SHIPPED | OUTPATIENT
Start: 2020-06-25 | End: 2021-06-10 | Stop reason: SDUPTHER

## 2020-06-25 RX ORDER — ALENDRONATE SODIUM 70 MG/1
70 TABLET ORAL
Qty: 4 TABLET | Refills: 0 | Status: SHIPPED | OUTPATIENT
Start: 2020-06-25 | End: 2020-10-30 | Stop reason: SDUPTHER

## 2020-06-25 RX ORDER — LEVOTHYROXINE SODIUM 0.03 MG/1
25 TABLET ORAL DAILY
Qty: 90 TABLET | Refills: 0 | Status: SHIPPED | OUTPATIENT
Start: 2020-06-25 | End: 2020-07-02 | Stop reason: SDUPTHER

## 2020-06-25 NOTE — TELEPHONE ENCOUNTER
winnie in your folder for review.  Per office visit on 4/6/2020 patient to follow up 7/2020.    Okay to refill?

## 2020-06-26 ENCOUNTER — TELEMEDICINE (OUTPATIENT)
Dept: FAMILY MEDICINE CLINIC | Facility: CLINIC | Age: 55
End: 2020-06-26

## 2020-06-26 DIAGNOSIS — F31.32 BIPOLAR AFFECTIVE DISORDER, CURRENTLY DEPRESSED, MODERATE (HCC): ICD-10-CM

## 2020-06-26 DIAGNOSIS — M19.90 GENERALIZED ARTHRITIS: Primary | ICD-10-CM

## 2020-06-26 DIAGNOSIS — J30.1 ALLERGIC RHINITIS DUE TO POLLEN, UNSPECIFIED SEASONALITY: ICD-10-CM

## 2020-06-26 PROCEDURE — 99214 OFFICE O/P EST MOD 30 MIN: CPT | Performed by: FAMILY MEDICINE

## 2020-06-26 RX ORDER — MELOXICAM 15 MG/1
15 TABLET ORAL DAILY
Qty: 30 TABLET | Refills: 3 | Status: SHIPPED | OUTPATIENT
Start: 2020-06-26 | End: 2020-07-29 | Stop reason: SDUPTHER

## 2020-06-26 RX ORDER — LORATADINE 10 MG/1
10 TABLET ORAL DAILY
Qty: 30 TABLET | Refills: 11 | Status: SHIPPED | OUTPATIENT
Start: 2020-06-26 | End: 2020-07-29 | Stop reason: SDUPTHER

## 2020-06-26 RX ORDER — LAMOTRIGINE 100 MG/1
150 TABLET ORAL DAILY
Qty: 45 TABLET | Refills: 3 | Status: SHIPPED | OUTPATIENT
Start: 2020-06-26 | End: 2020-07-21 | Stop reason: SDUPTHER

## 2020-06-26 NOTE — PROGRESS NOTES
Subjective   Va Pastor is a 55 y.o. female. Worsening joint pain, bipolar, and allergies via mychart video visit.    You have chosen to receive care through a telehealth visit.  Do you consent to use a video/audio connection for your medical care today? Yes      History of Present Illness     Generalized arthritis-patient says she has been having worsening joint pain.  She has been off of the meloxicam for a few months because of a problem back then.  She thought she was getting worsening air breathing because of the meloxicam.  However turned out she had pneumonia but the meloxicam was never restarted.  She says the pain is from her neck all the way down her spinal column and her hips.  She is asking to be returned to the meloxicam.  She is been trying Tylenol but is not helping nearly enough.    Bipolar disorder-patient says because of all the pain she has been dealing with she has not been sleeping hardly at all.  She is concerned that she may start to get manic and is wondering if we can increase her lamotrigine to 150 mg daily.  Denies any suicidal thoughts or homicidal thoughts.  Denies any depression.  Concentration is not good right now.    Allergic rhinitis-patient says her allergy medication is not helping very much.  She is taking Zyrtec and Singulair.  She is also asking if she can get a referral to allergy as she may need new allergy testing.  She has not tried other brands of allergy medications such as Claritin or Allegra.    The following portions of the patient's history were reviewed and updated as appropriate: allergies, current medications, past family history, past medical history, past social history, past surgical history and problem list.    Review of Systems   Constitutional: Negative for fatigue and fever.   HENT: Positive for congestion, postnasal drip, sinus pressure and sneezing.    Respiratory: Negative for shortness of breath and wheezing.    Cardiovascular: Negative for chest pain  and palpitations.   Gastrointestinal: Negative for constipation and nausea.   Psychiatric/Behavioral: Positive for decreased concentration, dysphoric mood and sleep disturbance. Negative for agitation, behavioral problems, confusion, hallucinations, self-injury and suicidal ideas. The patient is nervous/anxious. The patient is not hyperactive.        Objective   Physical Exam   Constitutional: She appears well-developed and well-nourished. No distress.   HENT:   Right Ear: External ear normal.   Left Ear: External ear normal.   Nose: Nose normal.   Eyes: Conjunctivae are normal. Right eye exhibits no discharge. Left eye exhibits no discharge.   Pulmonary/Chest: Effort normal.   Psychiatric: She has a normal mood and affect. Her speech is normal and behavior is normal. Judgment and thought content normal. Cognition and memory are normal. She is attentive.       Assessment/Plan   Diagnoses and all orders for this visit:    Generalized arthritis  -     meloxicam (MOBIC) 15 MG tablet; Take 1 tablet by mouth Daily.    Bipolar affective disorder, currently depressed, moderate (CMS/HCC)  -     lamoTRIgine (LaMICtal) 100 MG tablet; Take 1.5 tablets by mouth Daily.    Allergic rhinitis due to pollen, unspecified seasonality  -     loratadine (Claritin) 10 MG tablet; Take 1 tablet by mouth Daily.  -     Ambulatory Referral to Allergy      All of her request today seem reasonable.  I am going to restart her meloxicam and increase her lamotrigine to 150 mg.  I reiterated to her that lamotrigine is a medication that we cannot go up very fast on because of the risk of Serna-Jewel syndrome.  Months ago switch her Zyrtec over to Claritin and put in a referral to allergy.  Plan on having her back in about 2 months.    I spent 20 minutes on the call and another 5 minutes completing the encounter along with research.

## 2020-06-26 NOTE — PATIENT INSTRUCTIONS
Bipolar 1 Disorder  Bipolar 1 disorder is a mental health disorder in which a person has episodes of emotional highs (ben), and may also have episodes of emotional lows (depression) in addition to highs. Bipolar 1 disorder is different from other bipolar disorders because it involves extreme manic episodes. These episodes last at least one week or involve symptoms that are so severe that hospitalization is needed to keep the person safe.  What increases the risk?  The cause of this condition is not known. However, certain factors make you more likely to have bipolar disorder, such as:  · Having a family member with the disorder.  · An imbalance of certain chemicals in the brain (neurotransmitters).  · Stress, such as illness, financial problems, or a death.  · Certain conditions that affect the brain or spinal cord (neurologic conditions).  · Brain injury (trauma).  · Having another mental health disorder, such as:  ? Obsessive compulsive disorder.  ? Schizophrenia.  What are the signs or symptoms?  Symptoms of ben include:  · Very high self-esteem or self-confidence.  · Decreased need for sleep.  · Unusual talkativeness or feeling a need to keep talking. Speech may be very fast. It may seem like you cannot stop talking.  · Racing thoughts or constant talking, with quick shifts between topics that may or may not be related (flight of ideas).  · Decreased ability to focus or concentrate.  · Increased purposeful activity, such as work, studies, or social activity.  · Increased nonproductive activity. This could be pacing, squirming and fidgeting, or finger and toe tapping.  · Impulsive behavior and poor judgment. This may result in high-risk activities, such as having unprotected sex or spending a lot of money.  Symptoms of depression include:  · Feeling sad, hopeless, or helpless.  · Frequent or uncontrollable crying.  · Lack of feeling or caring about anything.  · Sleeping too much.  · Moving more slowly than  usual.  · Not being able to enjoy things you used to enjoy.  · Wanting to be alone all the time.  · Feeling guilty or worthless.  · Lack of energy or motivation.  · Trouble concentrating or remembering.  · Trouble making decisions.  · Increased appetite.  · Thoughts of death, or the desire to harm yourself.  Sometimes, you may have a mixed mood. This means having symptoms of depression and ben. Stress can make symptoms worse.  How is this diagnosed?  To diagnose bipolar disorder, your health care provider may ask about your:  · Emotional episodes.  · Medical history.  · Alcohol and drug use. This includes prescription medicines. Certain medical conditions and substances can cause symptoms that seem like bipolar disorder (secondary bipolar disorder).  How is this treated?  Bipolar disorder is a long-term (chronic) illness. It is best controlled with ongoing (continuous) treatment rather than treatment only when symptoms occur. Treatment may include:  · Medicine. Medicine can be prescribed by a provider who specializes in treating mental disorders (psychiatrist).  ? Medicines called mood stabilizers are usually prescribed.  ? If symptoms occur even while taking a mood stabilizer, other medicines may be added.  · Psychotherapy. Some forms of talk therapy, such as cognitive-behavioral therapy (CBT), can provide support, education, and guidance.  · Coping methods, such as journaling or relaxation exercises. These may include:  ? Yoga.  ? Meditation.  ? Deep breathing.  · Lifestyle changes, such as:  ? Limiting alcohol and drug use.  ? Exercising regularly.  ? Getting plenty of sleep.  ? Making healthy eating choices.    A combination of medicine, talk therapy, and coping methods is best. A procedure in which electricity is applied to the brain through the scalp (electroconvulsive therapy) may be used in cases of severe ben when medicine and psychotherapy work too slowly or do not work.  Follow these instructions at  home:  Activity    · Return to your normal activities as told by your health care provider.  · Find activities that you enjoy, and make time to do them.  · Exercise regularly as told by your health care provider.  Lifestyle  · Limit alcohol intake to no more than 1 drink a day for nonpregnant women and 2 drinks a day for men. One drink equals 12 oz of beer, 5 oz of wine, or 1½ oz of hard liquor.  · Follow a set schedule for eating and sleeping.  · Eat a balanced diet that includes fresh fruits and vegetables, whole grains, low-fat dairy, and lean meat.  · Get 7-8 hours of sleep each night.  General instructions  · Take over-the-counter and prescription medicines only as told by your health care provider.  · Think about joining a support group. Your health care provider may be able to recommend a support group.  · Talk with your family and loved ones about your treatment goals and how they can help.  · Keep all follow-up visits as told by your health care provider. This is important.  Where to find more information  For more information about bipolar disorder, visit the following websites:  · National [place] on Mental Illness: www.edgar.org  · [place] National [place] of Mental Health: www.nimh.nih.gov  Contact a health care provider if:  · Your symptoms get worse.  · You have side effects from your medicine, and they get worse.  · You have trouble sleeping.  · You have trouble doing daily activities.  · You feel unsafe in your surroundings.  · You are dealing with substance abuse.  Get help right away if:  · You have new symptoms.  · You have thoughts about harming yourself.  · You self-harm.  This information is not intended to replace advice given to you by your health care provider. Make sure you discuss any questions you have with your health care provider.  Document Released: 03/26/2002 Document Revised: 11/30/2018 Document Reviewed: 08/17/2017  Elsevier Patient Education © 2020 [employer].

## 2020-07-02 RX ORDER — CETIRIZINE HYDROCHLORIDE 10 MG/1
10 TABLET ORAL DAILY
Qty: 90 TABLET | Refills: 1 | Status: SHIPPED | OUTPATIENT
Start: 2020-07-02 | End: 2021-06-08

## 2020-07-02 RX ORDER — LEVOTHYROXINE SODIUM 0.03 MG/1
25 TABLET ORAL DAILY
Qty: 90 TABLET | Refills: 0 | Status: SHIPPED | OUTPATIENT
Start: 2020-07-02 | End: 2020-12-14 | Stop reason: SDUPTHER

## 2020-07-21 DIAGNOSIS — F31.32 BIPOLAR AFFECTIVE DISORDER, CURRENTLY DEPRESSED, MODERATE (HCC): ICD-10-CM

## 2020-07-21 DIAGNOSIS — J44.9 CHRONIC OBSTRUCTIVE PULMONARY DISEASE, UNSPECIFIED COPD TYPE (HCC): ICD-10-CM

## 2020-07-21 RX ORDER — ALBUTEROL SULFATE 90 UG/1
2 AEROSOL, METERED RESPIRATORY (INHALATION) EVERY 4 HOURS PRN
Qty: 18 G | Refills: 2 | Status: SHIPPED | OUTPATIENT
Start: 2020-07-21 | End: 2020-07-23 | Stop reason: SDUPTHER

## 2020-07-21 RX ORDER — LAMOTRIGINE 100 MG/1
150 TABLET ORAL DAILY
Qty: 45 TABLET | Refills: 2 | Status: SHIPPED | OUTPATIENT
Start: 2020-07-21 | End: 2020-09-29 | Stop reason: SDUPTHER

## 2020-07-23 DIAGNOSIS — J44.9 CHRONIC OBSTRUCTIVE PULMONARY DISEASE, UNSPECIFIED COPD TYPE (HCC): ICD-10-CM

## 2020-07-23 RX ORDER — ALBUTEROL SULFATE 90 UG/1
2 AEROSOL, METERED RESPIRATORY (INHALATION) EVERY 4 HOURS PRN
Qty: 18 G | Refills: 2 | Status: SHIPPED | OUTPATIENT
Start: 2020-07-23 | End: 2020-08-06 | Stop reason: SDUPTHER

## 2020-07-29 DIAGNOSIS — M19.90 GENERALIZED ARTHRITIS: ICD-10-CM

## 2020-07-29 DIAGNOSIS — J30.1 ALLERGIC RHINITIS DUE TO POLLEN, UNSPECIFIED SEASONALITY: ICD-10-CM

## 2020-07-29 DIAGNOSIS — J44.9 CHRONIC OBSTRUCTIVE PULMONARY DISEASE, UNSPECIFIED COPD TYPE (HCC): ICD-10-CM

## 2020-07-29 RX ORDER — LORATADINE 10 MG/1
10 TABLET ORAL DAILY
Qty: 30 TABLET | Refills: 11 | Status: SHIPPED | OUTPATIENT
Start: 2020-07-29 | End: 2021-06-10 | Stop reason: SDUPTHER

## 2020-07-29 RX ORDER — MELOXICAM 15 MG/1
15 TABLET ORAL DAILY
Qty: 30 TABLET | Refills: 0 | Status: SHIPPED | OUTPATIENT
Start: 2020-07-29 | End: 2020-09-25 | Stop reason: SDUPTHER

## 2020-08-06 DIAGNOSIS — F32.A ANXIETY AND DEPRESSION: ICD-10-CM

## 2020-08-06 DIAGNOSIS — J44.9 CHRONIC OBSTRUCTIVE PULMONARY DISEASE, UNSPECIFIED COPD TYPE (HCC): ICD-10-CM

## 2020-08-06 DIAGNOSIS — F41.9 ANXIETY AND DEPRESSION: ICD-10-CM

## 2020-08-06 RX ORDER — BUSPIRONE HYDROCHLORIDE 15 MG/1
15 TABLET ORAL 2 TIMES DAILY
Qty: 60 TABLET | Refills: 2 | Status: SHIPPED | OUTPATIENT
Start: 2020-08-06 | End: 2021-01-11 | Stop reason: SDUPTHER

## 2020-08-06 RX ORDER — POTASSIUM CHLORIDE 750 MG/1
10 TABLET, FILM COATED, EXTENDED RELEASE ORAL 2 TIMES DAILY
Qty: 60 TABLET | Refills: 2 | Status: SHIPPED | OUTPATIENT
Start: 2020-08-06 | End: 2020-10-30 | Stop reason: SDUPTHER

## 2020-08-06 RX ORDER — ALBUTEROL SULFATE 90 UG/1
2 AEROSOL, METERED RESPIRATORY (INHALATION) EVERY 4 HOURS PRN
Qty: 18 G | Refills: 2 | Status: SHIPPED | OUTPATIENT
Start: 2020-08-06 | End: 2021-02-08 | Stop reason: SDUPTHER

## 2020-09-04 DIAGNOSIS — G62.9 NEUROPATHY: ICD-10-CM

## 2020-09-04 DIAGNOSIS — M79.7 FIBROMYALGIA: ICD-10-CM

## 2020-09-08 RX ORDER — GABAPENTIN 300 MG/1
600 CAPSULE ORAL 3 TIMES DAILY
Qty: 180 CAPSULE | Refills: 2 | Status: SHIPPED | OUTPATIENT
Start: 2020-09-08 | End: 2021-06-08

## 2020-09-11 DIAGNOSIS — J44.9 CHRONIC OBSTRUCTIVE PULMONARY DISEASE, UNSPECIFIED COPD TYPE (HCC): ICD-10-CM

## 2020-09-11 DIAGNOSIS — G25.81 RLS (RESTLESS LEGS SYNDROME): ICD-10-CM

## 2020-09-11 RX ORDER — IPRATROPIUM BROMIDE AND ALBUTEROL SULFATE 2.5; .5 MG/3ML; MG/3ML
3 SOLUTION RESPIRATORY (INHALATION) EVERY 4 HOURS PRN
Qty: 360 ML | Refills: 1 | Status: SHIPPED | OUTPATIENT
Start: 2020-09-11

## 2020-09-11 RX ORDER — FLUTICASONE PROPIONATE 50 MCG
1 SPRAY, SUSPENSION (ML) NASAL DAILY
Qty: 16 G | Refills: 1 | Status: SHIPPED | OUTPATIENT
Start: 2020-09-11

## 2020-09-11 RX ORDER — ROPINIROLE 0.25 MG/1
TABLET, FILM COATED ORAL
Qty: 60 TABLET | Refills: 3 | Status: SHIPPED | OUTPATIENT
Start: 2020-09-11 | End: 2021-01-11 | Stop reason: SDUPTHER

## 2020-09-11 NOTE — TELEPHONE ENCOUNTER
Patient needing refill on following medications. Left message for patient to call back to schedule appointment for follow up.

## 2020-09-21 ENCOUNTER — TELEPHONE (OUTPATIENT)
Dept: FAMILY MEDICINE CLINIC | Facility: CLINIC | Age: 55
End: 2020-09-21

## 2020-09-25 ENCOUNTER — TELEPHONE (OUTPATIENT)
Dept: INTERNAL MEDICINE | Facility: CLINIC | Age: 55
End: 2020-09-25

## 2020-09-25 DIAGNOSIS — K21.9 GASTROESOPHAGEAL REFLUX DISEASE, ESOPHAGITIS PRESENCE NOT SPECIFIED: Primary | ICD-10-CM

## 2020-09-25 DIAGNOSIS — M19.90 GENERALIZED ARTHRITIS: ICD-10-CM

## 2020-09-25 DIAGNOSIS — M54.9 ACUTE UPPER BACK PAIN: ICD-10-CM

## 2020-09-25 NOTE — TELEPHONE ENCOUNTER
PATIENT REQUESTS A MY CHART VISIT.  HUB UNABLE TO SCHEDULE NONE AVAILABLE.  PLEASE CONTACT PATIENT.    CALL BACK #: [phone]

## 2020-09-25 NOTE — TELEPHONE ENCOUNTER
Spoke with patient. States that she has moved to [place] and switched providers but isn't satisfied with her and would like to come back to Dr. Phillip.

## 2020-09-25 NOTE — TELEPHONE ENCOUNTER
PATIENT IS REQUESTING REFILL OF THE FOLLOWING MEDICINE:    famotidine (PEPCID) 10 MG tablet [Faraz Phillip MD]    PATIENT CALL BACK: 9/25/2020    PHARMACY:  [facility] - [address] - [phone]  - [phone] FX    NOTE:  TRYING TO GET AN APPOINTMENT SCHEDULED.

## 2020-09-26 RX ORDER — MELOXICAM 15 MG/1
15 TABLET ORAL DAILY
Qty: 30 TABLET | Refills: 0 | Status: SHIPPED | OUTPATIENT
Start: 2020-09-26 | End: 2020-11-10 | Stop reason: SDUPTHER

## 2020-09-26 RX ORDER — FAMOTIDINE 20 MG/1
20 TABLET, FILM COATED ORAL 2 TIMES DAILY PRN
Qty: 60 TABLET | Refills: 2 | Status: SHIPPED | OUTPATIENT
Start: 2020-09-26 | End: 2020-11-10 | Stop reason: SDUPTHER

## 2020-09-26 RX ORDER — BACLOFEN 10 MG/1
10 TABLET ORAL 3 TIMES DAILY PRN
Qty: 60 TABLET | Refills: 2 | Status: SHIPPED | OUTPATIENT
Start: 2020-09-26 | End: 2021-07-15

## 2020-09-29 DIAGNOSIS — F31.32 BIPOLAR AFFECTIVE DISORDER, CURRENTLY DEPRESSED, MODERATE (HCC): ICD-10-CM

## 2020-09-29 RX ORDER — LAMOTRIGINE 100 MG/1
150 TABLET ORAL DAILY
Qty: 45 TABLET | Refills: 2 | Status: SHIPPED | OUTPATIENT
Start: 2020-09-29 | End: 2020-10-29

## 2020-10-29 ENCOUNTER — OFFICE VISIT (OUTPATIENT)
Dept: FAMILY MEDICINE CLINIC | Facility: CLINIC | Age: 55
End: 2020-10-29

## 2020-10-29 DIAGNOSIS — R32 URINARY INCONTINENCE, UNSPECIFIED TYPE: ICD-10-CM

## 2020-10-29 DIAGNOSIS — F31.32 BIPOLAR AFFECTIVE DISORDER, CURRENTLY DEPRESSED, MODERATE (HCC): Primary | ICD-10-CM

## 2020-10-29 PROCEDURE — 99212 OFFICE O/P EST SF 10 MIN: CPT | Performed by: FAMILY MEDICINE

## 2020-10-29 RX ORDER — OXYBUTYNIN CHLORIDE 10 MG/1
10 TABLET, EXTENDED RELEASE ORAL DAILY
Qty: 30 TABLET | Refills: 5 | Status: SHIPPED | OUTPATIENT
Start: 2020-10-29 | End: 2021-02-08 | Stop reason: SDUPTHER

## 2020-10-29 RX ORDER — LAMOTRIGINE 200 MG/1
200 TABLET ORAL DAILY
Qty: 30 TABLET | Refills: 5 | Status: SHIPPED | OUTPATIENT
Start: 2020-10-29 | End: 2021-06-10 | Stop reason: SDUPTHER

## 2020-10-29 NOTE — PATIENT INSTRUCTIONS
Bipolar 1 Disorder  Bipolar 1 disorder is a mental health disorder in which a person has episodes of emotional highs, or ben, and may also have episodes of lows, or depression. Bipolar 1 disorder is different from other bipolar disorders in that it involves extreme episodes of ben (manic episodes). These episodes last at least one week or involve symptoms that are so severe that hospitalization is needed to keep the person safe.  What are the causes?  The cause of this condition is not known.  What increases the risk?  The following factors may make you more likely to develop this condition:  · Having a family member with the disorder.  · Having an imbalance of certain chemicals in the brain (neurotransmitters).  · Experiencing stress, such as illness, financial problems, or a death.  · Having certain conditions that affect the brain or spinal cord (neurologic conditions).  · Having had a brain injury (trauma).  What are the signs or symptoms?  Symptoms of ben include:  · Very high self-esteem or self-confidence.  · Decreased need for sleep.  · Unusual talkativeness. Speech may be very fast.  · Racing thoughts with quick shifts between topics that may or may not be related (flight of ideas).  · Ability to concentrate either greatly improved or decreased.  · Increased purposeful activity, such as work, studies, or social activity.  · Increased agitation. This could be pacing, squirming, fidgeting, or finger and toe tapping.  · Impulsive behavior and poor judgment. This may result in high-risk activities that are sexual, financial, or physical.  Symptoms of depression include:  · Extreme degrees of sadness, uncontrollable crying, hopelessness, worthlessness, or numbness.  · Sleep problems, such as insomnia, waking early, or sleeping too much.  · No longer enjoying things you used to enjoy.  · Isolation. You may often spend time alone.  · Lack of energy or motivation, and moving more slowly than  normal.  · Trouble making decisions.  · Increased appetite or loss of appetite.  · Thoughts of death, or wanting to harm yourself.  Sometimes, you may have a mixed mood. This means having symptoms of ben and depression at the same time. Stress can often trigger these symptoms.  How is this diagnosed?  This condition may be diagnosed based on:  · Emotional episodes.  · Medical history.  · Use of alcohol, drugs, and prescription medicines. Certain medical conditions and substances can cause symptoms that seem like bipolar disorder. This is called secondary bipolar disorder.  Your health care provider may ask you to take a short test. This helps to understand your symptoms. You may also be asked to see a mental health specialist to follow up on this diagnosis and start treatment.  How is this treated?         This condition is a long-term (chronic) illness. It is often managed with ongoing treatment rather than treatment only when symptoms occur. A combination of treatments is the main approach. Treatment may include:  · Medicine. Medicine can be prescribed by a health care provider who specializes in treating mental health disorders (psychiatrist). Medicines called mood stabilizers are usually prescribed. If symptoms occur during treatment with a mood stabilizer, other medicines may be added.  · Psychotherapy. Some forms of talk therapy, such as cognitive behavioral therapy (CBT) and family therapy, can help with learning to manage bipolar disorder.  · Psychoeducation. This helps you and others understand how this disorder is managed. Include friends and family in educational sessions so they learn how best to support you.  · Methods of managing your condition, such as journaling or relaxation exercises. Relaxation exercises include:  ? Yoga.  ? Meditation.  ? Deep breathing.  · Lifestyle changes, such as:  ? Limiting alcohol and drug use.  ? Exercising regularly.  ? Structuring when you go to bed and get  up.  ? Eating a healthy diet.  · Electroconvulsive therapy (ECT). This is a procedure in which electricity is applied to the brain through the scalp. It may be used in cases of severe bipolar disorder when medicine and psychotherapy work too slowly or do not work.  Follow these instructions at home:  Activity  · Return to your normal activities as told by your health care provider.  · Find activities that you enjoy, and make time to do them.  · Exercise regularly as told by your health care provider.  Lifestyle    · Follow a set schedule for eating and sleeping.  · Eat a healthy diet that includes fresh fruits and vegetables, whole grains, low-fat dairy, and lean meat.  · Get at least 7-8 hours of sleep each night.  · Avoid using products that contain nicotine or tobacco. If you want help quitting, ask your health care provider.  · Do not use drugs.  Alcohol use  · Do not drink alcohol if:  ? Your health care provider tells you not to drink.  ? You are pregnant, may be pregnant, or are planning to become pregnant.  · If you drink alcohol:  ? Limit how much you use to:  § 0-1 drink a day for women.  § 0-2 drinks a day for men.  ? Be aware of how much alcohol is in your drink. In the [place] one drink equals one 12 oz bottle of beer (355 mL), one 5 oz glass of wine (148 mL), or one 1½ oz glass of hard liquor (44 mL).  General instructions  · Take over-the-counter and prescription medicines only as told by your health care provider. You may think about stopping your medicine, but it is very important to take all your medicine as prescribed.  · Consider joining a support group. Your health care provider may be able to recommend one.  · Talk with your family and friends about your treatment goals and how they can help.  · Keep all follow-up visits as told by your health care provider. This is important.  Where to find more information  · National [place] on Mental Illness: www.edgar.org  · National [place] of Mental  Health: www.[facility].gov  Contact a health care provider if:  · Your symptoms get worse, or your loved ones tell you that your symptoms are getting worse.  · You have uncomfortable side effects from your medicine.  · You have trouble sleeping.  · You have trouble doing daily activities.  · You feel unsafe in your surroundings.  · You are self-medicating with alcohol or drugs.  Get help right away if:  · You have new symptoms.  · You have thoughts about harming yourself or others.  · You are considering suicide.  If you ever feel like you may hurt yourself or others, or have thoughts about taking your own life, get help right away. You can go to your nearest emergency department or call:  · Your local emergency services (911 in the [place]  · A suicide crisis helpline, such as the National Suicide Prevention Lifeline at 1-869.134.1449. This is open 24 hours a day.  Summary  · Bipolar 1 disorder is a lifelong mental health disorder in which a person has episodes of ben and depression.  · This disorder is mainly treated with a combination of medicines, talk and behavioral therapies, and, often, electroconvulsive therapy (ECT).  · Include friends and family in educational sessions so they know how best to support you.  · Get help right away if you are considering suicide.  This information is not intended to replace advice given to you by your health care provider. Make sure you discuss any questions you have with your health care provider.  Document Released: 03/26/2002 Document Revised: 06/02/2020 Document Reviewed: 06/02/2020  Elsevier Patient Education © 2020 Elsevier Inc.

## 2020-10-30 DIAGNOSIS — M81.0 AGE-RELATED OSTEOPOROSIS WITHOUT CURRENT PATHOLOGICAL FRACTURE: ICD-10-CM

## 2020-10-30 RX ORDER — POTASSIUM CHLORIDE 750 MG/1
10 TABLET, FILM COATED, EXTENDED RELEASE ORAL 2 TIMES DAILY
Qty: 60 TABLET | Refills: 2 | Status: SHIPPED | OUTPATIENT
Start: 2020-10-30 | End: 2021-02-08 | Stop reason: SDUPTHER

## 2020-10-30 RX ORDER — ALENDRONATE SODIUM 70 MG/1
70 TABLET ORAL
Qty: 4 TABLET | Refills: 2 | Status: SHIPPED | OUTPATIENT
Start: 2020-10-30 | End: 2021-01-11 | Stop reason: SDUPTHER

## 2020-11-05 RX ORDER — SERTRALINE HYDROCHLORIDE 100 MG/1
150 TABLET, FILM COATED ORAL DAILY
Qty: 45 TABLET | Refills: 5 | Status: SHIPPED | OUTPATIENT
Start: 2020-11-05 | End: 2020-11-06

## 2020-11-10 DIAGNOSIS — M19.90 GENERALIZED ARTHRITIS: ICD-10-CM

## 2020-11-10 DIAGNOSIS — K21.9 GASTROESOPHAGEAL REFLUX DISEASE: ICD-10-CM

## 2020-11-10 DIAGNOSIS — F31.32 BIPOLAR AFFECTIVE DISORDER, CURRENTLY DEPRESSED, MODERATE (HCC): ICD-10-CM

## 2020-11-10 RX ORDER — FAMOTIDINE 20 MG/1
20 TABLET, FILM COATED ORAL 2 TIMES DAILY PRN
Qty: 60 TABLET | Refills: 2 | Status: SHIPPED | OUTPATIENT
Start: 2020-11-10 | End: 2021-01-11 | Stop reason: SDUPTHER

## 2020-11-10 RX ORDER — MELOXICAM 15 MG/1
15 TABLET ORAL DAILY
Qty: 30 TABLET | Refills: 2 | Status: SHIPPED | OUTPATIENT
Start: 2020-11-10 | End: 2021-05-10 | Stop reason: SDUPTHER

## 2020-11-10 RX ORDER — HYDROXYZINE HYDROCHLORIDE 25 MG/1
25 TABLET, FILM COATED ORAL DAILY
Qty: 30 TABLET | Refills: 2 | Status: SHIPPED | OUTPATIENT
Start: 2020-11-10

## 2020-11-10 NOTE — TELEPHONE ENCOUNTER
Received a refill request from [facility] for   Meloxicam  Famotidine  Hydroxyzine    I saw where you seen her 10/29/2020. She was to follow up with Janet the end of this month sometime. Are you okay to go ahead and refill these meds?

## 2020-11-17 ENCOUNTER — OFFICE VISIT (OUTPATIENT)
Dept: FAMILY MEDICINE CLINIC | Facility: CLINIC | Age: 55
End: 2020-11-17

## 2020-11-17 VITALS
SYSTOLIC BLOOD PRESSURE: 164 MMHG | TEMPERATURE: 98 F | HEART RATE: 92 BPM | RESPIRATION RATE: 17 BRPM | HEIGHT: 62 IN | DIASTOLIC BLOOD PRESSURE: 90 MMHG | OXYGEN SATURATION: 95 % | WEIGHT: 191.6 LBS | BODY MASS INDEX: 35.26 KG/M2

## 2020-11-17 DIAGNOSIS — M25.511 ACUTE PAIN OF RIGHT SHOULDER: Primary | ICD-10-CM

## 2020-11-17 RX ORDER — HYDROCODONE BITARTRATE AND ACETAMINOPHEN 7.5; 325 MG/1; MG/1
7.5 TABLET ORAL
COMMUNITY
Start: 2020-11-05

## 2020-11-17 NOTE — PROGRESS NOTES
The patient left the office before the visit was finished.  Patient is here today due to right shoulder pain.  She received cortisone injection in the past and is asking for that today.  Me know the PA is authorized to do joint injections.  There is an appointment available on Thursday to see Dr. Phillip for joint injection.  Patient states she is not sure she can wait or get a ride for Thursday.  She was instructed to report to the emergency room if her pain worsens or needs further evaluation.

## 2020-11-20 NOTE — PROGRESS NOTES
I called and spoke to [facility] # [id number] from [facility], she call number provided in chart. Patient did not answer and there was no machine or voicemail to leave message on. I will send out letter. Subjective   Va Pastor is a 54 y.o. female present today for a 6 week follow up for anxiety and depression.      History of Present Illness     Patient was checked into [facility] in [place] and after a few days she got sick with CAP and switched to [facility].  She was discharged back to [facility].  She says that the [facility] lost her medications including her Zyprexa.  The summaries from [facility] and [place] are not currently available.  She says she feels a little better and the skin picking has since stopped.  They believe it was due to her sertraline which she is no longer taking.  She says she feels okay although she still recovering from the pneumonia but she is very concerned that she has been off her Zyprexa for a few days and that she could go back to the place she was.  She thinks she took Tegretol at one point in the past and did okay on it and she has been taking lamotrigine but they did not give her prescription to go home with.    The following portions of the patient's history were reviewed and updated as appropriate: allergies, current medications, past family history, past medical history, past social history, past surgical history and problem list.    Review of Systems   Constitutional: Negative for activity change, appetite change, fatigue and fever.   HENT: Negative for ear pain, facial swelling and sore throat.    Eyes: Negative for discharge and itching.   Respiratory: Positive for cough. Negative for chest tightness and shortness of breath.    Cardiovascular: Negative for chest pain and palpitations.   Gastrointestinal: Negative for abdominal distention and constipation.   Endocrine: Negative for polydipsia, polyphagia and polyuria.   Genitourinary: Negative for difficulty urinating and flank pain.   Musculoskeletal: Negative for arthralgias and back pain.   Skin: Negative for color change, rash and wound.   Allergic/Immunologic: Negative for environmental allergies and food allergies.    Neurological: Negative for weakness and numbness.   Hematological: Negative for adenopathy. Does not bruise/bleed easily.   Psychiatric/Behavioral: Positive for decreased concentration and dysphoric mood. The patient is nervous/anxious.    All other systems reviewed and are negative.      Objective   Physical Exam   Constitutional: She is oriented to person, place, and time. She appears well-developed and well-nourished. No distress.   HENT:   Mouth/Throat: Oropharynx is clear and moist. No oropharyngeal exudate.   Eyes: Conjunctivae are normal. Right eye exhibits no discharge. Left eye exhibits no discharge.   Neck: Neck supple.   Cardiovascular: Normal rate, regular rhythm and normal heart sounds. Exam reveals no gallop and no friction rub.   No murmur heard.  Pulmonary/Chest: Effort normal and breath sounds normal. She has no wheezes. She has no rales.   Abdominal: Soft. Bowel sounds are normal. She exhibits no distension. There is no tenderness.   Musculoskeletal: She exhibits no edema or deformity.   Lymphadenopathy:     She has no cervical adenopathy.   Neurological: She is alert and oriented to person, place, and time.   Skin: Skin is warm and dry. No rash noted.   Psychiatric: Her speech is normal and behavior is normal. Judgment and thought content normal. Her mood appears anxious. Cognition and memory are normal. She is attentive.   Nursing note and vitals reviewed.      Assessment/Plan   Va was seen today for follow-up.    Diagnoses and all orders for this visit:    Bipolar affective disorder, currently manic, moderate (CMS/HCC)  -     lamoTRIgine (LAMICTAL) 25 MG tablet; Take 1 tablet once daily for two weeks and then increase to two tablets daily.  -     carBAMazepine (TEGRETOL) 200 MG tablet; Take 1 tablet by mouth 2 (Two) Times a Day.      Patient will be started on carbamazepine and also refilled her lamotrigine.  I explained to her that this is to help her get through the weekend but I would  like to see her back in 2 weeks.  She says she is [place] bipolar illness.  In the meantime I will continue to take over the bipolar meds and she will just have to go to the hospital if she gets worse again.    I spent 40 minutes in the room with greater than 50% of the time counseling regarding bipolar affective disorder and ben.  We discussed the signs and symptoms of that disease and also the treatment options.  We also counseled on the proper use of lamotrigine and carbamazepine and how it would affect her mood.  We also counseled on the use of no alcohol while on these medications and that she needs to follow-up with me promptly in 2 weeks for me to determine if she is doing okay.

## 2020-11-30 ENCOUNTER — TELEPHONE (OUTPATIENT)
Dept: INTERNAL MEDICINE | Facility: CLINIC | Age: 55
End: 2020-11-30

## 2020-11-30 NOTE — TELEPHONE ENCOUNTER
Patient reports the following symptoms:  - Headache  - Sinus Pressure  - Sore Throat  - Cough  - Wheezing (audible)  - Body Aches  - Back Pain  - Slight Chills  Patient would like something called in to help. Yamile Nicole on [address] [facility] [address].    Patient can be reached at [phone].

## 2020-12-01 ENCOUNTER — OFFICE VISIT (OUTPATIENT)
Dept: FAMILY MEDICINE CLINIC | Facility: CLINIC | Age: 55
End: 2020-12-01

## 2020-12-01 DIAGNOSIS — R05.9 COUGH: ICD-10-CM

## 2020-12-01 DIAGNOSIS — J44.9 CHRONIC OBSTRUCTIVE PULMONARY DISEASE, UNSPECIFIED COPD TYPE (HCC): Primary | ICD-10-CM

## 2020-12-01 PROCEDURE — 99442 PR PHYS/QHP TELEPHONE EVALUATION 11-20 MIN: CPT | Performed by: NURSE PRACTITIONER

## 2020-12-01 NOTE — PROGRESS NOTES
Subjective   Va Pastor is a 55 y.o. female who is being evaluated by telephone visit for cough, fever, chest congestion, sore throat.  Patient states she has chest congestion with sore throat cough and wheezing that started 2 days ago.  She has a history of COPD.  She states she has been using her albuterol inhaler.  She reports mild fever last night.     History of Present Illness   You have chosen to receive care through a telephone visit. Do you consent to use a telephone visit for your medical care today? Yes        The following portions of the patient's history were reviewed and updated as appropriate: allergies, current medications, past family history, past medical history, past social history, past surgical history and problem list.    Review of Systems   Constitutional: Positive for fatigue and fever.   HENT: Positive for congestion and sore throat. Negative for sinus pressure and sinus pain.    Respiratory: Positive for cough and wheezing. Negative for chest tightness and shortness of breath.    Cardiovascular: Negative for chest pain and leg swelling.   Gastrointestinal: Negative for diarrhea and vomiting.   Neurological: Negative for dizziness.       Objective   There were no vitals filed for this visit.  There is no height or weight on file to calculate BMI.    Physical Exam    Assessment/Plan   Diagnoses and all orders for this visit:    1. Chronic obstructive pulmonary disease, unspecified COPD type (CMS/[facility]) (Primary)    2. Cough        Assessment and plan  55 y.o. female who is being evaluated by telephone visit for cough congestion, sore throat, COPD.  Due to patient's medical history and severity of symptoms I have encouraged patient to report to urgent care for complete evaluation.     About 15 minutes spent reviewing the patient's chart and telephone visit, medical decision-making, and treatment plan.

## 2020-12-14 NOTE — TELEPHONE ENCOUNTER
Received a refill request from [facility] for levothyroxine. Patient last seen 12/1/2020 but does need to come back for a med check. Okay to refill?

## 2020-12-15 RX ORDER — LEVOTHYROXINE SODIUM 0.03 MG/1
25 TABLET ORAL DAILY
Qty: 90 TABLET | Refills: 0 | Status: SHIPPED | OUTPATIENT
Start: 2020-12-15 | End: 2021-01-11 | Stop reason: SDUPTHER

## 2020-12-18 ENCOUNTER — TELEPHONE (OUTPATIENT)
Dept: FAMILY MEDICINE CLINIC | Facility: CLINIC | Age: 55
End: 2020-12-18

## 2020-12-18 NOTE — TELEPHONE ENCOUNTER
Caller: Va Crowe    Relationship to patient: Self    Best call back number: [phone]       Concerns or Questions if Applicable: MS. CROWE IS WANTING TO KNOW WHEN HER LAST CHECK UP WAS  FOR 2020, SHE IS NEEDING INFORMATION FOR INSURANCE PURPOSES. SHE WAS SEEN ON 12/1/2020, DO NOT KNOW DETAILS OF VISIT.     PLEASE ADVISE

## 2021-01-11 DIAGNOSIS — M81.0 AGE-RELATED OSTEOPOROSIS WITHOUT CURRENT PATHOLOGICAL FRACTURE: ICD-10-CM

## 2021-01-11 DIAGNOSIS — F32.A ANXIETY AND DEPRESSION: ICD-10-CM

## 2021-01-11 DIAGNOSIS — F41.9 ANXIETY AND DEPRESSION: ICD-10-CM

## 2021-01-11 DIAGNOSIS — G25.81 RLS (RESTLESS LEGS SYNDROME): ICD-10-CM

## 2021-01-11 DIAGNOSIS — K21.9 GASTROESOPHAGEAL REFLUX DISEASE: ICD-10-CM

## 2021-01-11 RX ORDER — BUSPIRONE HYDROCHLORIDE 15 MG/1
15 TABLET ORAL 2 TIMES DAILY
Qty: 60 TABLET | Refills: 2 | Status: SHIPPED | OUTPATIENT
Start: 2021-01-11 | End: 2021-06-08

## 2021-01-11 RX ORDER — LEVOTHYROXINE SODIUM 0.03 MG/1
25 TABLET ORAL DAILY
Qty: 90 TABLET | Refills: 0 | Status: SHIPPED | OUTPATIENT
Start: 2021-01-11 | End: 2021-05-21 | Stop reason: SDUPTHER

## 2021-01-11 RX ORDER — ERGOCALCIFEROL 1.25 MG/1
50000 CAPSULE ORAL WEEKLY
Qty: 4 CAPSULE | Refills: 0 | Status: SHIPPED | OUTPATIENT
Start: 2021-01-11 | End: 2021-06-08

## 2021-01-11 RX ORDER — ALENDRONATE SODIUM 70 MG/1
70 TABLET ORAL
Qty: 4 TABLET | Refills: 2 | Status: SHIPPED | OUTPATIENT
Start: 2021-01-11 | End: 2021-06-08

## 2021-01-11 RX ORDER — ROPINIROLE 0.25 MG/1
TABLET, FILM COATED ORAL
Qty: 60 TABLET | Refills: 3 | Status: SHIPPED | OUTPATIENT
Start: 2021-01-11

## 2021-01-11 RX ORDER — FAMOTIDINE 20 MG/1
20 TABLET, FILM COATED ORAL 2 TIMES DAILY PRN
Qty: 60 TABLET | Refills: 2 | Status: SHIPPED | OUTPATIENT
Start: 2021-01-11 | End: 2021-06-16 | Stop reason: SDUPTHER

## 2021-01-11 NOTE — TELEPHONE ENCOUNTER
Received a refill request from [facility] for vitamin D, alendronate sodium, buspirone, famotidine,  ropinirole HCL, levothyroxine,    She is scheduled for a med check 1/25/2021.    Okay to refill?

## 2021-01-25 ENCOUNTER — TELEMEDICINE (OUTPATIENT)
Dept: FAMILY MEDICINE CLINIC | Facility: CLINIC | Age: 56
End: 2021-01-25

## 2021-01-25 DIAGNOSIS — H93.8X9 EAR CONGESTION, UNSPECIFIED LATERALITY: Primary | ICD-10-CM

## 2021-01-25 PROCEDURE — 99442 PR PHYS/QHP TELEPHONE EVALUATION 11-20 MIN: CPT | Performed by: NURSE PRACTITIONER

## 2021-01-25 NOTE — PROGRESS NOTES
Kia Pastor is a 55 y.o. female.     No chief complaint on file.       There were no vitals filed for this visit.     History of Present Illness     The following portions of the patient's history were reviewed and updated as appropriate: allergies, current medications, past family history, past medical history, past social history, past surgical history, and problem list.    Review of Systems    Objective   Physical Exam      Assessment/Plan   Problems Addressed this Visit     None      Diagnoses    None.

## 2021-01-26 NOTE — PROGRESS NOTES
Subjective   Va Pastor is a 55 y.o. female who is being evaluated by telephone visit for ear congestion .     History of Present Illness   This is a 55-year-old been evaluated through telephone visit due to ear congestion.  Patient reports ear congestion started earlier this week and she wants to get on top of it.  She does deny pain, no fever chills.  She does not have any other symptoms.  She is not sure if it is an ear infection or blocked ear canal.    You have chosen to receive care through a telephone visit. Do you consent to use a telephone visit for your medical care today? Yes        The following portions of the patient's history were reviewed and updated as appropriate: allergies, current medications, past family history, past medical history, past social history, past surgical history and problem list.    Review of Systems   Constitutional: Negative for chills and fever.   HENT: Positive for hearing loss. Negative for ear discharge, ear pain, sinus pressure and sore throat.        Objective   There were no vitals filed for this visit.  There is no height or weight on file to calculate BMI.    Physical Exam    Assessment/Plan   There are no diagnoses linked to this encounter.    Assessment and plan  55 y.o. female who is being evaluated by telephone visit for ear congestion     About 15 minutes spent reviewing the patient's chart and telephone visit, medical decision-making, and treatment plan.  I have instructed the patient to continue Claritin for ear congestion.  I have asked her to come in to be evaluated if ear congestion continues due to needing to look in her ear for infection or irrigation.  Patient reports she will contact her daughter and try to get in to see us this week.    I spent a total of 10 minutes with patient today

## 2021-02-08 DIAGNOSIS — R32 URINARY INCONTINENCE, UNSPECIFIED TYPE: ICD-10-CM

## 2021-02-08 DIAGNOSIS — K21.9 GASTROESOPHAGEAL REFLUX DISEASE: ICD-10-CM

## 2021-02-08 DIAGNOSIS — J44.9 CHRONIC OBSTRUCTIVE PULMONARY DISEASE, UNSPECIFIED COPD TYPE (HCC): ICD-10-CM

## 2021-02-09 RX ORDER — OXYBUTYNIN CHLORIDE 10 MG/1
10 TABLET, EXTENDED RELEASE ORAL DAILY
Qty: 30 TABLET | Refills: 0 | Status: SHIPPED | OUTPATIENT
Start: 2021-02-09 | End: 2021-06-08

## 2021-02-09 RX ORDER — ALBUTEROL SULFATE 90 UG/1
2 AEROSOL, METERED RESPIRATORY (INHALATION) EVERY 4 HOURS PRN
Qty: 18 G | Refills: 0 | Status: SHIPPED | OUTPATIENT
Start: 2021-02-09 | End: 2021-06-23 | Stop reason: SDUPTHER

## 2021-02-09 RX ORDER — PANTOPRAZOLE SODIUM 20 MG/1
20 TABLET, DELAYED RELEASE ORAL DAILY
Qty: 30 TABLET | Refills: 0 | Status: SHIPPED | OUTPATIENT
Start: 2021-02-09 | End: 2021-06-10 | Stop reason: SDUPTHER

## 2021-02-09 RX ORDER — POTASSIUM CHLORIDE 750 MG/1
10 TABLET, FILM COATED, EXTENDED RELEASE ORAL 2 TIMES DAILY
Qty: 60 TABLET | Refills: 0 | Status: SHIPPED | OUTPATIENT
Start: 2021-02-09 | End: 2021-06-10 | Stop reason: SDUPTHER

## 2021-02-25 DIAGNOSIS — F31.32 BIPOLAR AFFECTIVE DISORDER, CURRENTLY DEPRESSED, MODERATE (HCC): ICD-10-CM

## 2021-02-25 DIAGNOSIS — M19.90 GENERALIZED ARTHRITIS: ICD-10-CM

## 2021-02-25 DIAGNOSIS — M54.9 ACUTE UPPER BACK PAIN: ICD-10-CM

## 2021-02-25 RX ORDER — ERGOCALCIFEROL 1.25 MG/1
50000 CAPSULE ORAL WEEKLY
Qty: 4 CAPSULE | Refills: 0 | Status: CANCELLED | OUTPATIENT
Start: 2021-02-25

## 2021-02-25 RX ORDER — BACLOFEN 10 MG/1
10 TABLET ORAL 3 TIMES DAILY PRN
Qty: 60 TABLET | Refills: 2 | Status: CANCELLED | OUTPATIENT
Start: 2021-02-25

## 2021-02-25 RX ORDER — HYDROXYZINE HYDROCHLORIDE 25 MG/1
25 TABLET, FILM COATED ORAL DAILY
Qty: 30 TABLET | Refills: 2 | Status: CANCELLED | OUTPATIENT
Start: 2021-02-25

## 2021-02-25 RX ORDER — MELOXICAM 15 MG/1
15 TABLET ORAL DAILY
Qty: 30 TABLET | Refills: 2 | Status: CANCELLED | OUTPATIENT
Start: 2021-02-25

## 2021-02-25 NOTE — TELEPHONE ENCOUNTER
On 2/8/21 I sent a message for patient to make an appt for any future med refills. This has not been done yet.

## 2021-03-24 ENCOUNTER — BULK ORDERING (OUTPATIENT)
Dept: CASE MANAGEMENT | Facility: OTHER | Age: 56
End: 2021-03-24

## 2021-03-24 DIAGNOSIS — Z23 IMMUNIZATION DUE: ICD-10-CM

## 2021-03-26 DIAGNOSIS — F31.32 BIPOLAR AFFECTIVE DISORDER, CURRENTLY DEPRESSED, MODERATE (HCC): ICD-10-CM

## 2021-03-26 DIAGNOSIS — M19.90 GENERALIZED ARTHRITIS: ICD-10-CM

## 2021-03-26 DIAGNOSIS — M54.9 ACUTE UPPER BACK PAIN: ICD-10-CM

## 2021-03-26 RX ORDER — BACLOFEN 10 MG/1
10 TABLET ORAL 3 TIMES DAILY PRN
Qty: 60 TABLET | Refills: 0 | OUTPATIENT
Start: 2021-03-26

## 2021-03-26 RX ORDER — LAMOTRIGINE 200 MG/1
200 TABLET ORAL DAILY
Qty: 30 TABLET | Refills: 0 | OUTPATIENT
Start: 2021-03-26

## 2021-03-26 RX ORDER — MELOXICAM 15 MG/1
15 TABLET ORAL DAILY
Qty: 30 TABLET | Refills: 0 | OUTPATIENT
Start: 2021-03-26

## 2021-03-26 RX ORDER — HYDROXYZINE HYDROCHLORIDE 25 MG/1
25 TABLET, FILM COATED ORAL DAILY
Qty: 30 TABLET | Refills: 0 | OUTPATIENT
Start: 2021-03-26

## 2021-03-26 RX ORDER — ERGOCALCIFEROL 1.25 MG/1
50000 CAPSULE ORAL WEEKLY
Qty: 4 CAPSULE | Refills: 0 | OUTPATIENT
Start: 2021-03-26

## 2021-05-10 DIAGNOSIS — M19.90 GENERALIZED ARTHRITIS: ICD-10-CM

## 2021-05-10 RX ORDER — MELOXICAM 15 MG/1
15 TABLET ORAL DAILY
Qty: 14 TABLET | Refills: 0 | Status: SHIPPED | OUTPATIENT
Start: 2021-05-10 | End: 2021-06-10 | Stop reason: SDUPTHER

## 2021-05-10 NOTE — TELEPHONE ENCOUNTER
Caller: Va Pastor    Relationship: Self    Best call back number: [phone]    Medication needed:   Requested Prescriptions     Pending Prescriptions Disp Refills   • meloxicam (MOBIC) 15 MG tablet 30 tablet 2     Sig: Take 1 tablet by mouth Daily.       When do you need the refill by: 5/10/21  What additional details did the patient provide when requesting the medication: PATIENT HAS APPOINTMENT ON 5/18/21    Does the patient have less than a 3 day supply:  [x] Yes  [] No    What is the patient's preferred pharmacy: [facility] - [address] - [phone]  - [phone] FX

## 2021-05-19 RX ORDER — ERGOCALCIFEROL 1.25 MG/1
50000 CAPSULE ORAL WEEKLY
Qty: 4 CAPSULE | Refills: 0 | OUTPATIENT
Start: 2021-05-19

## 2021-06-08 ENCOUNTER — OFFICE VISIT (OUTPATIENT)
Dept: FAMILY MEDICINE CLINIC | Facility: CLINIC | Age: 56
End: 2021-06-08

## 2021-06-08 VITALS
HEART RATE: 86 BPM | HEIGHT: 62 IN | WEIGHT: 179 LBS | DIASTOLIC BLOOD PRESSURE: 80 MMHG | BODY MASS INDEX: 32.94 KG/M2 | SYSTOLIC BLOOD PRESSURE: 138 MMHG | TEMPERATURE: 98.4 F

## 2021-06-08 DIAGNOSIS — Z12.31 ENCOUNTER FOR SCREENING MAMMOGRAM FOR MALIGNANT NEOPLASM OF BREAST: ICD-10-CM

## 2021-06-08 DIAGNOSIS — J44.9 CHRONIC OBSTRUCTIVE PULMONARY DISEASE, UNSPECIFIED COPD TYPE (HCC): ICD-10-CM

## 2021-06-08 DIAGNOSIS — E55.9 VITAMIN D DEFICIENCY: ICD-10-CM

## 2021-06-08 DIAGNOSIS — Z13.220 SCREENING FOR HYPERLIPIDEMIA: ICD-10-CM

## 2021-06-08 DIAGNOSIS — Z13.820 SCREENING FOR OSTEOPOROSIS: ICD-10-CM

## 2021-06-08 DIAGNOSIS — E03.9 HYPOTHYROIDISM, UNSPECIFIED TYPE: ICD-10-CM

## 2021-06-08 DIAGNOSIS — Z13.0 SCREENING FOR DEFICIENCY ANEMIA: ICD-10-CM

## 2021-06-08 DIAGNOSIS — F32.A DEPRESSIVE DISORDER: ICD-10-CM

## 2021-06-08 DIAGNOSIS — H66.93 BILATERAL OTITIS MEDIA, UNSPECIFIED OTITIS MEDIA TYPE: Primary | ICD-10-CM

## 2021-06-08 DIAGNOSIS — E53.8 VITAMIN B 12 DEFICIENCY: ICD-10-CM

## 2021-06-08 PROBLEM — F41.9 ANXIETY: Status: ACTIVE | Noted: 2021-06-08

## 2021-06-08 PROBLEM — G62.9 NEUROPATHY: Status: ACTIVE | Noted: 2021-06-08

## 2021-06-08 PROBLEM — I10 HYPERTENSION: Status: ACTIVE | Noted: 2021-06-08

## 2021-06-08 PROBLEM — J45.909 ASTHMA: Status: ACTIVE | Noted: 2021-06-08

## 2021-06-08 PROBLEM — K21.9 GASTROESOPHAGEAL REFLUX DISEASE: Status: ACTIVE | Noted: 2021-06-08

## 2021-06-08 PROBLEM — M81.0 OSTEOPOROSIS: Status: ACTIVE | Noted: 2021-06-08

## 2021-06-08 PROCEDURE — 99214 OFFICE O/P EST MOD 30 MIN: CPT | Performed by: NURSE PRACTITIONER

## 2021-06-08 RX ORDER — GABAPENTIN 800 MG/1
800 TABLET ORAL 3 TIMES DAILY PRN
COMMUNITY
Start: 2021-05-29

## 2021-06-08 RX ORDER — FLUCONAZOLE 150 MG/1
150 TABLET ORAL ONCE
Qty: 1 TABLET | Refills: 0 | Status: SHIPPED | OUTPATIENT
Start: 2021-06-08 | End: 2021-06-12 | Stop reason: SDUPTHER

## 2021-06-08 RX ORDER — LEVOTHYROXINE SODIUM 0.03 MG/1
25 TABLET ORAL DAILY
Qty: 30 TABLET | Refills: 5 | Status: SHIPPED | OUTPATIENT
Start: 2021-06-08 | End: 2021-06-10 | Stop reason: SDUPTHER

## 2021-06-08 RX ORDER — SERTRALINE HYDROCHLORIDE 100 MG/1
150 TABLET, FILM COATED ORAL DAILY
COMMUNITY
Start: 2021-04-21 | End: 2021-06-08

## 2021-06-08 RX ORDER — THEOPHYLLINE 300 MG/1
300 TABLET, EXTENDED RELEASE ORAL 2 TIMES DAILY
COMMUNITY
Start: 2021-05-17

## 2021-06-08 RX ORDER — NICOTINE POLACRILEX 2 MG
LOZENGE BUCCAL
COMMUNITY
Start: 2021-05-17

## 2021-06-08 RX ORDER — ALBUTEROL SULFATE 2.5 MG/3ML
SOLUTION RESPIRATORY (INHALATION)
COMMUNITY
Start: 2021-05-28

## 2021-06-08 RX ORDER — AMOXICILLIN 500 MG/1
1000 CAPSULE ORAL 2 TIMES DAILY
Qty: 40 CAPSULE | Refills: 0 | Status: SHIPPED | OUTPATIENT
Start: 2021-06-08 | End: 2021-07-15

## 2021-06-08 NOTE — PROGRESS NOTES
"Chief Complaint  Hypothyroidism (fasting ), COPD, and Manic Behavior    Subjective          Va Pastor presents to [facility]  History of Present Illness   This is a 56-year-old female patient here today for evaluation of hypothyroidism, GERD, COPD, ear pain. She report bilateral ears draining and pain that has been going on for 1 week. She is concerned about her dry skin and would like to see a dermatologist. She is on synthroid for hypothyroidism and denies cold intolerance, or mood swings. She has bipolar disorder and her Lamictal was increased at last visit. She reports her mood is good. She denies any suicidal thoughts. She has COPD  And is on trelegy. She states she has appt with pulmonology end of this month. She denies any cough or shortness of breath today.      Objective   Vital Signs:   /80   Pulse 86   Temp 98.4 °F (36.9 °C)   Ht 157 cm (61.81\")   Wt 81.2 kg (179 lb)   BMI 32.94 kg/m²     Physical Exam  Vitals and nursing note reviewed.   Constitutional:       Appearance: Normal appearance.   HENT:      Head: Normocephalic.      Right Ear: Tympanic membrane is erythematous.      Left Ear: Tympanic membrane is erythematous.      Nose: Nose normal.      Mouth/Throat:      Mouth: Mucous membranes are moist.      Pharynx: No oropharyngeal exudate or posterior oropharyngeal erythema.   Eyes:      Pupils: Pupils are equal, round, and reactive to light.   Cardiovascular:      Rate and Rhythm: Normal rate and regular rhythm.      Pulses: Normal pulses.      Heart sounds: Normal heart sounds.   Pulmonary:      Effort: Pulmonary effort is normal. No respiratory distress.      Breath sounds: Normal breath sounds. No wheezing or rales.   Abdominal:      General: Bowel sounds are normal. There is no distension.      Tenderness: There is no abdominal tenderness.   Musculoskeletal:         General: No swelling.      Cervical back: Neck supple.      Right lower leg: No edema. "      Left lower leg: No edema.   Skin:     General: Skin is warm and dry.   Neurological:      Mental Status: She is alert and oriented to person, place, and time.   Psychiatric:         Mood and Affect: Mood normal.        Result Review :                 Assessment and Plan    Diagnoses and all orders for this visit:    1. Bilateral otitis media, unspecified otitis media type (Primary)  -     amoxicillin (AMOXIL) 500 MG capsule; Take 2 capsules by mouth 2 (Two) Times a Day.  Dispense: 40 capsule; Refill: 0  -     Discontinue: fluconazole (Diflucan) 150 MG tablet; Take 1 tablet by mouth 1 (One) Time for 1 dose.  Dispense: 1 tablet; Refill: 0    2. Encounter for screening mammogram for malignant neoplasm of breast  -     Mammo Screening Bilateral With CAD; Future    3. Screening for osteoporosis  -     DEXA Bone Density Axial; Future    4. Chronic obstructive pulmonary disease, unspecified COPD type (CMS/HCC)  -     Fluticasone-Umeclidin-Vilant (Trelegy Ellipta) 100-62.5-25 MCG/INH inhaler; Inhale 1 puff Daily.  Dispense: 28 each; Refill: 0    5. Vitamin B 12 deficiency  -     Vitamin B12    6. Vitamin D deficiency  -     Vitamin D 25 hydroxy    7. Hypothyroidism, unspecified type  -     T4  -     TSH    8. Depressive disorder    9. Screening for hyperlipidemia  -     Comprehensive metabolic panel  -     Lipid panel    10. Screening for deficiency anemia  -     CBC w AUTO Differential    Her ears are infected so I will give her abx today  We will obtain labs for evaluation  I will send in referral for dermatology, and GI for colon cancer screening  She is agreeable to get mammo and dexascan done soon.               Follow Up   Return in about 6 months (around 12/8/2021).  Patient was given instructions and counseling regarding her condition or for health maintenance advice. Please see specific information pulled into the AVS if appropriate.

## 2021-06-09 ENCOUNTER — TELEPHONE (OUTPATIENT)
Dept: FAMILY MEDICINE CLINIC | Facility: CLINIC | Age: 56
End: 2021-06-09

## 2021-06-09 LAB
25(OH)D3+25(OH)D2 SERPL-MCNC: 27.1 NG/ML (ref 30–100)
ALBUMIN SERPL-MCNC: 4.3 G/DL (ref 3.8–4.9)
ALBUMIN/GLOB SERPL: 2.3 {RATIO} (ref 1.2–2.2)
ALP SERPL-CCNC: 110 IU/L (ref 48–121)
ALT SERPL-CCNC: 6 IU/L (ref 0–32)
AST SERPL-CCNC: 13 IU/L (ref 0–40)
BASOPHILS # BLD AUTO: 0.1 X10E3/UL (ref 0–0.2)
BASOPHILS NFR BLD AUTO: 1 %
BILIRUB SERPL-MCNC: <0.2 MG/DL (ref 0–1.2)
BUN SERPL-MCNC: 11 MG/DL (ref 6–24)
BUN/CREAT SERPL: 11 (ref 9–23)
CALCIUM SERPL-MCNC: 9.3 MG/DL (ref 8.7–10.2)
CHLORIDE SERPL-SCNC: 103 MMOL/L (ref 96–106)
CHOLEST SERPL-MCNC: 175 MG/DL (ref 100–199)
CO2 SERPL-SCNC: 26 MMOL/L (ref 20–29)
CREAT SERPL-MCNC: 0.99 MG/DL (ref 0.57–1)
EOSINOPHIL # BLD AUTO: 0.1 X10E3/UL (ref 0–0.4)
EOSINOPHIL NFR BLD AUTO: 2 %
ERYTHROCYTE [DISTWIDTH] IN BLOOD BY AUTOMATED COUNT: 11.9 % (ref 11.7–15.4)
GLOBULIN SER CALC-MCNC: 1.9 G/DL (ref 1.5–4.5)
GLUCOSE SERPL-MCNC: 89 MG/DL (ref 65–99)
HCT VFR BLD AUTO: 40.3 % (ref 34–46.6)
HDLC SERPL-MCNC: 50 MG/DL
HGB BLD-MCNC: 13.5 G/DL (ref 11.1–15.9)
IMM GRANULOCYTES # BLD AUTO: 0 X10E3/UL (ref 0–0.1)
IMM GRANULOCYTES NFR BLD AUTO: 0 %
LDLC SERPL CALC-MCNC: 110 MG/DL (ref 0–99)
LYMPHOCYTES # BLD AUTO: 2 X10E3/UL (ref 0.7–3.1)
LYMPHOCYTES NFR BLD AUTO: 27 %
MCH RBC QN AUTO: 31 PG (ref 26.6–33)
MCHC RBC AUTO-ENTMCNC: 33.5 G/DL (ref 31.5–35.7)
MCV RBC AUTO: 92 FL (ref 79–97)
MONOCYTES # BLD AUTO: 0.5 X10E3/UL (ref 0.1–0.9)
MONOCYTES NFR BLD AUTO: 6 %
NEUTROPHILS # BLD AUTO: 4.8 X10E3/UL (ref 1.4–7)
NEUTROPHILS NFR BLD AUTO: 64 %
PLATELET # BLD AUTO: 252 X10E3/UL (ref 150–450)
POTASSIUM SERPL-SCNC: 4.1 MMOL/L (ref 3.5–5.2)
PROT SERPL-MCNC: 6.2 G/DL (ref 6–8.5)
RBC # BLD AUTO: 4.36 X10E6/UL (ref 3.77–5.28)
SODIUM SERPL-SCNC: 143 MMOL/L (ref 134–144)
T4 SERPL-MCNC: 6.1 UG/DL (ref 4.5–12)
TRIGL SERPL-MCNC: 82 MG/DL (ref 0–149)
TSH SERPL DL<=0.005 MIU/L-ACNC: 2.42 UIU/ML (ref 0.45–4.5)
VIT B12 SERPL-MCNC: 313 PG/ML (ref 232–1245)
VLDLC SERPL CALC-MCNC: 15 MG/DL (ref 5–40)
WBC # BLD AUTO: 7.5 X10E3/UL (ref 3.4–10.8)

## 2021-06-09 NOTE — TELEPHONE ENCOUNTER
"Caller: Va Pastor    Relationship: Self    Best call back number: [phone]    Medication needed: ASKED PATIENT WHAT MEDICATION IS SHE NEEDING AND PATIENT CONTINUED TO RESPOND WITH \"I TOLD THEM YESTERDAY ALL OF THE ONES I NEEDED AND THE PHARMACY HAS FAXED A REQUEST FOR ALL OF THEM 2 TIMES TODAY\" PATIENT COULD NOT STATE WHICH MEDICATIONS SHE IS NEEDING    When do you need the refill by: TODAY 06/09/2021    What additional details did the patient provide when requesting the medication: PATIENT STATES SHE IS OUT OF ALL OF HER MEDICATION FOR EACH PRESCRIPTION AND ADVISED JONATHON OF THIS AT HER APPOINTMENT YESTERDAY 06/08/2021 BUT PRESCRIPTIONS WERE NOT SENT TO PHARMACY    Does the patient have less than a 3 day supply:  [x] Yes  [] No    What is the patient's preferred pharmacy: [facility] - [address] - [phone]  - [phone] FX         "

## 2021-06-10 DIAGNOSIS — E03.9 HYPOTHYROIDISM, UNSPECIFIED TYPE: Primary | ICD-10-CM

## 2021-06-10 DIAGNOSIS — J30.1 ALLERGIC RHINITIS DUE TO POLLEN, UNSPECIFIED SEASONALITY: ICD-10-CM

## 2021-06-10 DIAGNOSIS — K21.9 GASTROESOPHAGEAL REFLUX DISEASE: ICD-10-CM

## 2021-06-10 DIAGNOSIS — J44.9 CHRONIC OBSTRUCTIVE PULMONARY DISEASE, UNSPECIFIED COPD TYPE (HCC): ICD-10-CM

## 2021-06-10 DIAGNOSIS — F31.32 BIPOLAR AFFECTIVE DISORDER, CURRENTLY DEPRESSED, MODERATE (HCC): ICD-10-CM

## 2021-06-10 DIAGNOSIS — M19.90 GENERALIZED ARTHRITIS: ICD-10-CM

## 2021-06-10 DIAGNOSIS — J30.89 ENVIRONMENTAL AND SEASONAL ALLERGIES: ICD-10-CM

## 2021-06-10 RX ORDER — MELOXICAM 15 MG/1
15 TABLET ORAL DAILY
Qty: 14 TABLET | Refills: 0 | Status: SHIPPED | OUTPATIENT
Start: 2021-06-10 | End: 2021-06-22 | Stop reason: SDUPTHER

## 2021-06-10 RX ORDER — LEVOTHYROXINE SODIUM 0.03 MG/1
25 TABLET ORAL DAILY
Qty: 30 TABLET | Refills: 5 | Status: SHIPPED | OUTPATIENT
Start: 2021-06-10

## 2021-06-10 RX ORDER — POTASSIUM CHLORIDE 750 MG/1
10 TABLET, FILM COATED, EXTENDED RELEASE ORAL 2 TIMES DAILY
Qty: 60 TABLET | Refills: 0 | Status: SHIPPED | OUTPATIENT
Start: 2021-06-10 | End: 2021-07-16 | Stop reason: SDUPTHER

## 2021-06-10 RX ORDER — LAMOTRIGINE 200 MG/1
400 TABLET ORAL DAILY
Qty: 30 TABLET | Refills: 5 | Status: SHIPPED | OUTPATIENT
Start: 2021-06-10 | End: 2021-10-08 | Stop reason: SDUPTHER

## 2021-06-10 RX ORDER — LORATADINE 10 MG/1
10 TABLET ORAL DAILY
Qty: 30 TABLET | Refills: 11 | Status: SHIPPED | OUTPATIENT
Start: 2021-06-10

## 2021-06-10 RX ORDER — PANTOPRAZOLE SODIUM 20 MG/1
20 TABLET, DELAYED RELEASE ORAL DAILY
Qty: 30 TABLET | Refills: 0 | Status: SHIPPED | OUTPATIENT
Start: 2021-06-10 | End: 2021-06-12 | Stop reason: SDUPTHER

## 2021-06-10 RX ORDER — MONTELUKAST SODIUM 10 MG/1
10 TABLET ORAL NIGHTLY
Qty: 90 TABLET | Refills: 0 | Status: SHIPPED | OUTPATIENT
Start: 2021-06-10 | End: 2021-10-08 | Stop reason: SDUPTHER

## 2021-06-11 DIAGNOSIS — Z12.11 ENCOUNTER FOR SCREENING COLONOSCOPY: ICD-10-CM

## 2021-06-11 DIAGNOSIS — L30.9 DERMATITIS: Primary | ICD-10-CM

## 2021-06-12 DIAGNOSIS — K21.9 GASTROESOPHAGEAL REFLUX DISEASE: ICD-10-CM

## 2021-06-12 DIAGNOSIS — H66.93 BILATERAL OTITIS MEDIA, UNSPECIFIED OTITIS MEDIA TYPE: ICD-10-CM

## 2021-06-14 ENCOUNTER — TELEPHONE (OUTPATIENT)
Dept: FAMILY MEDICINE CLINIC | Facility: CLINIC | Age: 56
End: 2021-06-14

## 2021-06-14 RX ORDER — PANTOPRAZOLE SODIUM 20 MG/1
20 TABLET, DELAYED RELEASE ORAL DAILY
Qty: 30 TABLET | Refills: 0 | Status: SHIPPED | OUTPATIENT
Start: 2021-06-14 | End: 2021-06-23 | Stop reason: SDUPTHER

## 2021-06-14 RX ORDER — FLUCONAZOLE 150 MG/1
150 TABLET ORAL ONCE
Qty: 1 TABLET | Refills: 0 | Status: SHIPPED | OUTPATIENT
Start: 2021-06-14 | End: 2021-06-14

## 2021-06-14 NOTE — TELEPHONE ENCOUNTER
REFERRAL FOR DERM WAS SENT TO [place] BECAUSE DERM IN [place] DOES NOT TAKE PASSPORT.  MAMMO AND DEXA SENT TO [facility] [place] [phone]

## 2021-06-14 NOTE — TELEPHONE ENCOUNTER
Caller: Va Pastor    Relationship: Self    Best call back number: [phone]    What specialty or service is being requested: DERMATOLOGIST, MAMMOGRAM AND BONE DENSITY    What is the provider, practice or medical service name: [facility]      Any additional details: PATIENT REQUESTING TO SEE ALL PROVIDERS IN [place]

## 2021-06-14 NOTE — TELEPHONE ENCOUNTER
Caller: Darby Va    Relationship: Self    Best call back number: [phone]    What medication are you requesting: VITAMIN D AND DAILY OSTEOPOROSIS      If a prescription is needed, what is your preferred pharmacy and phone number: [facility] - [address] - [phone]  - [phone] FX     Additional notes: PATIENT STATES SHE WAS TAKING AN OSTEOPOROSIS PILL WEEKLY BUT IS REQUESTING ONE TO TAKE DAILY AS SHE CAN'T REMEMBER TO TAKE IT ONCE PER WEEK.

## 2021-06-15 ENCOUNTER — TELEPHONE (OUTPATIENT)
Dept: GASTROENTEROLOGY | Facility: CLINIC | Age: 56
End: 2021-06-15

## 2021-06-15 NOTE — TELEPHONE ENCOUNTER
PATIENT STATES SHE WAS TAKING AN OSTEOPOROSIS PILL WEEKLY BUT IS REQUESTING ONE TO TAKE DAILY AS SHE CAN'T REMEMBER TO TAKE IT ONCE PER WEEK.

## 2021-06-16 DIAGNOSIS — K21.9 GASTROESOPHAGEAL REFLUX DISEASE: ICD-10-CM

## 2021-06-17 RX ORDER — FAMOTIDINE 20 MG/1
20 TABLET, FILM COATED ORAL 2 TIMES DAILY PRN
Qty: 60 TABLET | Refills: 5 | Status: SHIPPED | OUTPATIENT
Start: 2021-06-17 | End: 2021-11-11 | Stop reason: SDUPTHER

## 2021-06-22 DIAGNOSIS — M19.90 GENERALIZED ARTHRITIS: ICD-10-CM

## 2021-06-22 RX ORDER — MELOXICAM 15 MG/1
15 TABLET ORAL DAILY
Qty: 90 TABLET | Refills: 1 | Status: SHIPPED | OUTPATIENT
Start: 2021-06-22 | End: 2021-07-16 | Stop reason: SDUPTHER

## 2021-06-23 DIAGNOSIS — J44.9 CHRONIC OBSTRUCTIVE PULMONARY DISEASE, UNSPECIFIED COPD TYPE (HCC): ICD-10-CM

## 2021-06-23 DIAGNOSIS — K21.9 GASTROESOPHAGEAL REFLUX DISEASE: ICD-10-CM

## 2021-06-24 RX ORDER — PANTOPRAZOLE SODIUM 20 MG/1
20 TABLET, DELAYED RELEASE ORAL DAILY
Qty: 30 TABLET | Refills: 0 | Status: SHIPPED | OUTPATIENT
Start: 2021-06-24 | End: 2022-03-03

## 2021-06-24 RX ORDER — ALBUTEROL SULFATE 90 UG/1
2 AEROSOL, METERED RESPIRATORY (INHALATION) EVERY 4 HOURS PRN
Qty: 18 G | Refills: 0 | Status: SHIPPED | OUTPATIENT
Start: 2021-06-24 | End: 2021-08-10

## 2021-06-30 ENCOUNTER — TELEPHONE (OUTPATIENT)
Dept: FAMILY MEDICINE CLINIC | Facility: CLINIC | Age: 56
End: 2021-06-30

## 2021-06-30 NOTE — TELEPHONE ENCOUNTER
Caller: Va Pastor    Relationship: Self    Best call back number: [phone]    What is the medical concern/diagnosis: STOMACH ISSUES    What specialty or service is being requested: GI SPECIALIST     What is the provider, practice or medical service name: [facility]    What is the office location: [place] OFFICE IF THERE IS ONE THERE , [place]     NUMBER - [phone]        PATIENT HAD AN ULTRSAOUND DONE AT [facility] ALREADY JUST WANTED TO INFORM

## 2021-07-15 ENCOUNTER — OFFICE VISIT (OUTPATIENT)
Dept: FAMILY MEDICINE CLINIC | Facility: CLINIC | Age: 56
End: 2021-07-15

## 2021-07-15 VITALS
DIASTOLIC BLOOD PRESSURE: 84 MMHG | WEIGHT: 178 LBS | SYSTOLIC BLOOD PRESSURE: 124 MMHG | HEIGHT: 62 IN | OXYGEN SATURATION: 98 % | BODY MASS INDEX: 32.76 KG/M2 | HEART RATE: 84 BPM | RESPIRATION RATE: 22 BRPM | TEMPERATURE: 97.7 F

## 2021-07-15 DIAGNOSIS — R11.0 NAUSEA: Primary | ICD-10-CM

## 2021-07-15 DIAGNOSIS — R10.9 STOMACH PAIN: ICD-10-CM

## 2021-07-15 PROCEDURE — 99212 OFFICE O/P EST SF 10 MIN: CPT | Performed by: NURSE PRACTITIONER

## 2021-07-15 RX ORDER — SUCRALFATE 1 G/1
1 TABLET ORAL
COMMUNITY
Start: 2021-07-13

## 2021-07-15 RX ORDER — ONDANSETRON 4 MG/1
4 TABLET, FILM COATED ORAL
COMMUNITY
Start: 2021-06-24 | End: 2021-07-15

## 2021-07-15 NOTE — PROGRESS NOTES
"Chief Complaint  Hospital Follow Up Visit (abdominal pain. needs GI referral ) and Nausea    Subjective          Va Pastor presents to [facility]  History of Present Illness   This is a 56-year-old female patient here today for evaluation of abdominal pain.  Patient has reported to the emergency twice for abdominal pain..  She describes her pain as epigastric that goes up to her jaw.  Her work-up was negative and was given Carafate that she has not started yet.  Ultrasound and CT scan was negative.  She describes that her food gets stuck.  She also deals with constipation ER twice for abd pain, going up in jaw Given carafate yesterday havent started.  She has appointment with GI later next month and is asking for a quick furl in the local area.  She is afebrile today.  She denies any vomiting but some nausea     Objective   Vital Signs:   /84   Pulse 84   Temp 97.7 °F (36.5 °C)   Resp 22   Ht 157 cm (61.81\")   Wt 80.7 kg (178 lb)   SpO2 98%   BMI 32.76 kg/m²     Physical Exam  Vitals and nursing note reviewed.   HENT:      Head: Normocephalic.      Nose: Nose normal.   Eyes:      Pupils: Pupils are equal, round, and reactive to light.   Cardiovascular:      Rate and Rhythm: Normal rate and regular rhythm.      Pulses: Normal pulses.      Heart sounds: Normal heart sounds.   Pulmonary:      Effort: Pulmonary effort is normal. No respiratory distress.      Breath sounds: Normal breath sounds. No wheezing or rales.   Abdominal:      General: Bowel sounds are normal. There is no distension.      Tenderness: There is no abdominal tenderness.   Musculoskeletal:         General: No swelling.      Cervical back: Neck supple.      Right lower leg: No edema.      Left lower leg: No edema.   Skin:     General: Skin is warm and dry.   Neurological:      Mental Status: She is alert and oriented to person, place, and time.   Psychiatric:         Mood and Affect: Mood normal.      "   Result Review :                 Assessment and Plan    Diagnoses and all orders for this visit:    1. Nausea (Primary)    2. Stomach pain    Other orders  -     Cancel: Ambulatory Referral to Gastroenterology    We have discussed her appointment with GI in August and she is asked for us to route referral to the [place] area hoping for earlier appointment.  I have encouraged patient to take Carafate as directed by emergency room.  Patient has agreed and will continue all other medications as ordered.    Follow Up   No follow-ups on file.  Patient was given instructions and counseling regarding her condition or for health maintenance advice. Please see specific information pulled into the AVS if appropriate.

## 2021-07-16 DIAGNOSIS — M19.90 GENERALIZED ARTHRITIS: ICD-10-CM

## 2021-07-16 RX ORDER — MELOXICAM 15 MG/1
15 TABLET ORAL DAILY
Qty: 90 TABLET | Refills: 3 | Status: SHIPPED | OUTPATIENT
Start: 2021-07-16 | End: 2021-10-08 | Stop reason: SDUPTHER

## 2021-07-16 RX ORDER — POTASSIUM CHLORIDE 750 MG/1
10 TABLET, FILM COATED, EXTENDED RELEASE ORAL 2 TIMES DAILY
Qty: 60 TABLET | Refills: 3 | Status: SHIPPED | OUTPATIENT
Start: 2021-07-16 | End: 2021-10-08 | Stop reason: SDUPTHER

## 2021-07-19 ENCOUNTER — TELEPHONE (OUTPATIENT)
Dept: FAMILY MEDICINE CLINIC | Facility: CLINIC | Age: 56
End: 2021-07-19

## 2021-07-19 ENCOUNTER — TELEPHONE (OUTPATIENT)
Dept: GASTROENTEROLOGY | Facility: CLINIC | Age: 56
End: 2021-07-19

## 2021-07-19 NOTE — TELEPHONE ENCOUNTER
PATIENT STATES: THAT SHE WOULD LIKE TO SEE A GASTROENTEROLOGY THE NUMBER SHE WOULD LIKE TO GO TO [phone] SHE NEEDS APPT ASAP PLEASE ADVISE      PATIENT CAN BE REACHED ON: [phone]

## 2021-08-10 DIAGNOSIS — J44.9 CHRONIC OBSTRUCTIVE PULMONARY DISEASE, UNSPECIFIED COPD TYPE (HCC): ICD-10-CM

## 2021-08-10 RX ORDER — ALBUTEROL SULFATE 90 UG/1
AEROSOL, METERED RESPIRATORY (INHALATION)
Qty: 18 G | Refills: 2 | Status: SHIPPED | OUTPATIENT
Start: 2021-08-10

## 2021-08-18 ENCOUNTER — TELEPHONE (OUTPATIENT)
Dept: FAMILY MEDICINE CLINIC | Facility: CLINIC | Age: 56
End: 2021-08-18

## 2021-08-18 NOTE — TELEPHONE ENCOUNTER
Caller: Va Pastor    Relationship to patient: Self    Best call back number: [phone]    Patient is needing: PATIENT SAYS THAT SHE HAS COPD AND ASTHMA AND WANTED TO KNOW WHAT SHE SHOULD DO IF SHE HAS BEEN EXPOSED TO COVID. SHE ASKED FOR JONATHON JOHNSON TO CALL HER BACK [phone].

## 2021-08-19 NOTE — TELEPHONE ENCOUNTER
Patient advise it would be up to her to be test for covid 19 but advise to wait until 5 -7 days after being exposed and to quarantine for 14 days as well and if showing any symptoms then to go to urgent care

## 2021-08-19 NOTE — TELEPHONE ENCOUNTER
Caller: Va Pastor    Relationship: Self    Best call back number: [phone]    What was the call regarding: PATIENT IS CALLING AGAIN REGARDING COVID CONCERNS. SHE IS UNSURE IF SHE SHOULD GET TESTED, QUARANTINE OR WHAT TO DO. MOM WAS DIRECTLY EXPOSED AND THEN PATIENT WAS NEAR MOM. PLEASE ADVISE.     Do you require a callback: YES

## 2021-10-08 DIAGNOSIS — F31.32 BIPOLAR AFFECTIVE DISORDER, CURRENTLY DEPRESSED, MODERATE (HCC): ICD-10-CM

## 2021-10-08 DIAGNOSIS — M19.90 GENERALIZED ARTHRITIS: ICD-10-CM

## 2021-10-08 DIAGNOSIS — J44.9 CHRONIC OBSTRUCTIVE PULMONARY DISEASE, UNSPECIFIED COPD TYPE (HCC): ICD-10-CM

## 2021-10-08 DIAGNOSIS — J30.89 ENVIRONMENTAL AND SEASONAL ALLERGIES: ICD-10-CM

## 2021-10-08 RX ORDER — MONTELUKAST SODIUM 10 MG/1
10 TABLET ORAL NIGHTLY
Qty: 90 TABLET | Refills: 0 | Status: SHIPPED | OUTPATIENT
Start: 2021-10-08

## 2021-10-08 RX ORDER — MELOXICAM 15 MG/1
15 TABLET ORAL DAILY
Qty: 90 TABLET | Refills: 3 | Status: SHIPPED | OUTPATIENT
Start: 2021-10-08

## 2021-10-08 RX ORDER — POTASSIUM CHLORIDE 750 MG/1
10 TABLET, FILM COATED, EXTENDED RELEASE ORAL 2 TIMES DAILY
Qty: 60 TABLET | Refills: 3 | Status: SHIPPED | OUTPATIENT
Start: 2021-10-08 | End: 2022-03-01 | Stop reason: SDUPTHER

## 2021-10-08 RX ORDER — LAMOTRIGINE 200 MG/1
400 TABLET ORAL DAILY
Qty: 30 TABLET | Refills: 5 | Status: SHIPPED | OUTPATIENT
Start: 2021-10-08 | End: 2022-03-01 | Stop reason: SDUPTHER

## 2021-10-19 ENCOUNTER — TELEPHONE (OUTPATIENT)
Dept: GASTROENTEROLOGY | Facility: CLINIC | Age: 56
End: 2021-10-19

## 2021-10-19 NOTE — TELEPHONE ENCOUNTER
RECEIVED REQUEST FROM [facility] FOR REFILL ON FLUCONAZOLE.  ADVISED PHARMACY STAFF THAT PATIENT WAS ACTUALLY DR. EUBANKS' PATIENT FROM 2018.  EITHER THEY NEED TO SEND REQUEST TO DR. EUBANKS' CURRENT OFFICE, OR PATIENT NEEDS TO ESTABLISH WITH DR. RODRIGUEZ TO RECEIVE REFILL.

## 2021-11-11 DIAGNOSIS — K21.9 GASTROESOPHAGEAL REFLUX DISEASE: ICD-10-CM

## 2021-11-11 RX ORDER — FAMOTIDINE 20 MG/1
20 TABLET, FILM COATED ORAL 2 TIMES DAILY PRN
Qty: 60 TABLET | Refills: 5 | Status: SHIPPED | OUTPATIENT
Start: 2021-11-11

## 2021-11-11 NOTE — TELEPHONE ENCOUNTER
Next ov 12/10/21    Last ov 6/8/21    Return in about 6 months (around 12/8/2021).  Patient was given instructions and counseling regarding her condition or for health maintenance advice. Please see specific information pulled into the AVS if appropriateov     Last lab 6/8/21    Labs look ok, LDL is slightly elevated but will recheck in 6 months

## 2021-11-16 ENCOUNTER — TELEPHONE (OUTPATIENT)
Dept: FAMILY MEDICINE CLINIC | Facility: CLINIC | Age: 56
End: 2021-11-16

## 2021-11-16 RX ORDER — ONDANSETRON 4 MG/1
TABLET, FILM COATED ORAL
COMMUNITY
Start: 2021-10-08

## 2021-11-16 RX ORDER — PANTOPRAZOLE SODIUM 40 MG/1
TABLET, DELAYED RELEASE ORAL
COMMUNITY
Start: 2021-09-03

## 2021-11-16 RX ORDER — EPINEPHRINE 0.3 MG/.3ML
INJECTION SUBCUTANEOUS
COMMUNITY
Start: 2021-11-03

## 2021-11-16 RX ORDER — NALOXONE HYDROCHLORIDE 4 MG/.1ML
SPRAY NASAL SEE ADMIN INSTRUCTIONS
COMMUNITY
Start: 2021-10-27

## 2021-11-16 NOTE — TELEPHONE ENCOUNTER
Caller: Va Pastro    Relationship: Self    Best call back number: [phone]    What medication are you requesting: VITAMIN D    If a prescription is needed, what is your preferred pharmacy and phone number: [facility] - [address] - [phone]  - [phone]     Additional notes: PATIENT STATES HER VITAMIN D LEVELS ARE LOW AND SHE IS NEEDING A NEW PRESCRIPTION AS HER LAST ONE

## 2021-11-16 NOTE — TELEPHONE ENCOUNTER
Next ov 12/10/21    Last ov 6/8/21    Return in about 6 months (around 12/8/2021).  Patient was given instructions and counseling regarding her condition or for health maintenance advice. Please see specific information pulled into the AVS if appropriate.      Last lab 6/8/21    Labs look ok, LDL is slightly elevated but will recheck in 6 months.    Patient wants to  Go back on her vitamin D. States it is low and she no longer has a prescription.Wants a new one sent in, please and thank you

## 2022-01-20 ENCOUNTER — TELEPHONE (OUTPATIENT)
Dept: FAMILY MEDICINE CLINIC | Facility: CLINIC | Age: 57
End: 2022-01-20

## 2022-01-20 NOTE — TELEPHONE ENCOUNTER
Patient has not been seen since June. Called and a boy answered and said Va was not there  and hung up

## 2022-01-20 NOTE — TELEPHONE ENCOUNTER
Caller: Darby Va    Relationship: Self    Best call back number:   ) [phone] ([phone])       What medication are you requesting: AMTIBIOTIC     What are your current symptoms: EAR ACHE DRAINAGE OF RIGHT EAR    How long have you been experiencing symptoms: 3 DAYS    Have you had these symptoms before:    [x] Yes  [] No    Have you been treated for these symptoms before:   [x] Yes  [] No    If a prescription is needed, what is your preferred pharmacy and phone number: [facility] - [place] [address] - [phone] [facility] [phone]      Additional notes:PATIENT STATES  PRESCRIPTION NEEDS TO BE CALLED IN BY 3PM FOR DELIVERY TODAY

## 2022-01-20 NOTE — TELEPHONE ENCOUNTER
Patient called and was advise she needed to been seen for this or she could go to the Urgent care is what we recommend due to no appointments this afternoon here.

## 2022-03-01 DIAGNOSIS — F31.32 BIPOLAR AFFECTIVE DISORDER, CURRENTLY DEPRESSED, MODERATE: ICD-10-CM

## 2022-03-01 RX ORDER — POTASSIUM CHLORIDE 750 MG/1
10 TABLET, FILM COATED, EXTENDED RELEASE ORAL 2 TIMES DAILY
Qty: 60 TABLET | Refills: 0 | Status: SHIPPED | OUTPATIENT
Start: 2022-03-01 | End: 2022-04-11 | Stop reason: SDUPTHER

## 2022-03-01 RX ORDER — LAMOTRIGINE 200 MG/1
400 TABLET ORAL DAILY
Qty: 30 TABLET | Refills: 0 | Status: SHIPPED | OUTPATIENT
Start: 2022-03-01 | End: 2022-03-15 | Stop reason: SDUPTHER

## 2022-03-01 NOTE — TELEPHONE ENCOUNTER
Last lab 6/8/21    Labs look ok, LDL is slightly elevated but will recheck in 6 months.    Last ov 6/9/21    Return in about 6 months (around 12/8/2021).  Patient was given instructions and counseling regarding her condition or for health maintenance advice. Please see specific information pulled into the AVS if appropriate.

## 2022-03-03 ENCOUNTER — OFFICE VISIT (OUTPATIENT)
Dept: FAMILY MEDICINE CLINIC | Facility: CLINIC | Age: 57
End: 2022-03-03

## 2022-03-03 VITALS
SYSTOLIC BLOOD PRESSURE: 122 MMHG | DIASTOLIC BLOOD PRESSURE: 74 MMHG | WEIGHT: 163 LBS | TEMPERATURE: 98.4 F | RESPIRATION RATE: 22 BRPM | HEART RATE: 84 BPM | BODY MASS INDEX: 30 KG/M2 | HEIGHT: 62 IN | OXYGEN SATURATION: 98 %

## 2022-03-03 DIAGNOSIS — J44.1 COPD WITH EXACERBATION: ICD-10-CM

## 2022-03-03 DIAGNOSIS — R35.0 URINARY FREQUENCY: ICD-10-CM

## 2022-03-03 DIAGNOSIS — E03.9 HYPOTHYROIDISM, UNSPECIFIED TYPE: ICD-10-CM

## 2022-03-03 DIAGNOSIS — Z23 NEED FOR INFLUENZA VACCINATION: ICD-10-CM

## 2022-03-03 DIAGNOSIS — G89.29 CHRONIC BACK PAIN, UNSPECIFIED BACK LOCATION, UNSPECIFIED BACK PAIN LATERALITY: Primary | ICD-10-CM

## 2022-03-03 DIAGNOSIS — M54.2 NECK PAIN: ICD-10-CM

## 2022-03-03 DIAGNOSIS — R30.0 DYSURIA: ICD-10-CM

## 2022-03-03 DIAGNOSIS — E55.9 VITAMIN D DEFICIENCY: ICD-10-CM

## 2022-03-03 DIAGNOSIS — M19.90 GENERALIZED ARTHRITIS: ICD-10-CM

## 2022-03-03 DIAGNOSIS — R53.83 FATIGUE, UNSPECIFIED TYPE: ICD-10-CM

## 2022-03-03 DIAGNOSIS — E53.8 VITAMIN B 12 DEFICIENCY: ICD-10-CM

## 2022-03-03 DIAGNOSIS — N39.498 FREQUENT URINARY INCONTINENCE: ICD-10-CM

## 2022-03-03 DIAGNOSIS — Z13.0 SCREENING FOR DEFICIENCY ANEMIA: ICD-10-CM

## 2022-03-03 DIAGNOSIS — Z13.220 SCREENING FOR HYPERLIPIDEMIA: ICD-10-CM

## 2022-03-03 DIAGNOSIS — M54.9 CHRONIC BACK PAIN, UNSPECIFIED BACK LOCATION, UNSPECIFIED BACK PAIN LATERALITY: Primary | ICD-10-CM

## 2022-03-03 PROBLEM — K59.09 OTHER CONSTIPATION: Status: ACTIVE | Noted: 2021-09-02

## 2022-03-03 PROBLEM — R13.10 DYSPHAGIA: Status: ACTIVE | Noted: 2021-09-02

## 2022-03-03 PROBLEM — R11.0 NAUSEA: Status: ACTIVE | Noted: 2021-09-02

## 2022-03-03 PROBLEM — R10.11 RIGHT UPPER QUADRANT PAIN: Status: ACTIVE | Noted: 2021-09-02

## 2022-03-03 PROBLEM — R63.4 UNINTENTIONAL WEIGHT LOSS: Status: ACTIVE | Noted: 2021-09-02

## 2022-03-03 PROCEDURE — 90686 IIV4 VACC NO PRSV 0.5 ML IM: CPT | Performed by: NURSE PRACTITIONER

## 2022-03-03 PROCEDURE — 90471 IMMUNIZATION ADMIN: CPT | Performed by: NURSE PRACTITIONER

## 2022-03-03 PROCEDURE — 99214 OFFICE O/P EST MOD 30 MIN: CPT | Performed by: NURSE PRACTITIONER

## 2022-03-03 RX ORDER — TIOTROPIUM BROMIDE AND OLODATEROL 3.124; 2.736 UG/1; UG/1
SPRAY, METERED RESPIRATORY (INHALATION)
COMMUNITY
Start: 2021-09-14

## 2022-03-03 RX ORDER — SERTRALINE HYDROCHLORIDE 100 MG/1
TABLET, FILM COATED ORAL
COMMUNITY
Start: 2021-10-13

## 2022-03-03 NOTE — PROGRESS NOTES
"Chief Complaint  Arthritis, Vitamin D Deficiency, B12 Injection, Urinary Frequency, and Hypothyroidism    Subjective          Va Pastor presents to [facility]  History of Present Illness  This is a 56-year-old female patient here today for evaluation chronic arthritis, hypothyroidism, vitamin B12 deficiency, vitamin D deficiency, incontinence. She is requesting an referral to Dr Avitia who she has seen in the past for chronic neck pain. She is currently seeing pain management for chronic back and neck pain and has questions about spinal stimulator. She also is requesting evaluation of her incontinence that has been ongoing for many years. She denies hematuria. She reports leaking at night and urgency.  She does report fatigue that is been ongoing as well.  She takes vitamin D and received vitamin B12 shots in the past.  She does have sleep apnea and has not used her machine since the recall.     Objective   Vital Signs:   /74   Pulse 84   Temp 98.4 °F (36.9 °C)   Resp 22   Ht 157 cm (61.81\")   Wt 73.9 kg (163 lb)   SpO2 98%   BMI 30.00 kg/m²     Physical Exam  Vitals and nursing note reviewed.   HENT:      Head: Normocephalic.      Nose: Nose normal.   Eyes:      Pupils: Pupils are equal, round, and reactive to light.   Cardiovascular:      Rate and Rhythm: Normal rate and regular rhythm.      Pulses: Normal pulses.      Heart sounds: Normal heart sounds.   Pulmonary:      Effort: Pulmonary effort is normal. No respiratory distress.      Breath sounds: Normal breath sounds. No wheezing or rales.   Abdominal:      General: Bowel sounds are normal. There is no distension.      Tenderness: There is no abdominal tenderness.   Musculoskeletal:         General: No swelling.      Cervical back: Neck supple.      Right lower leg: No edema.      Left lower leg: No edema.   Skin:     General: Skin is warm and dry.      Capillary Refill: Capillary refill takes less than 2 seconds. "   Neurological:      Mental Status: She is alert and oriented to person, place, and time.   Psychiatric:         Mood and Affect: Mood normal.        Result Review :                 Assessment and Plan    Diagnoses and all orders for this visit:    1. Chronic back pain, unspecified back location, unspecified back pain laterality (Primary)    2. Hypothyroidism, unspecified type    3. Vitamin B 12 deficiency  -     Vitamin B12    4. Vitamin D deficiency  -     Vitamin D 25 Hydroxy    5. Screening for deficiency anemia  -     CBC & Differential    6. Fatigue, unspecified type  -     Folate    7. Dysuria  -     Urinalysis With Microscopic - Urine, Clean Catch  -     Urine Culture - Urine, Urine, Clean Catch    8. Screening for hyperlipidemia  -     Comprehensive Metabolic Panel  -     Lipid Panel    9. Generalized arthritis  -     Ambulatory Referral to Orthopedic Surgery    10. Neck pain  -     Ambulatory Referral to Orthopedic Surgery    11. Need for influenza vaccination  -     FluLaval/Fluarix/Fluzone >6 Months    12. Urinary frequency    13. COPD with exacerbation (HCC)    Other orders  -     Microscopic Examination -  -     Urine Culture - ,         She is due her mammo and dexascan. I will put that in today.  I will put in referral for Uro/gynocology.  Her urine was negative for blood or nitrates.  We will send for culture  She will follow up with pulmonary regarding her sleep apnea machine.  She is requesting referral with Dr Avitia for ortho for her chronic neck pain.     Follow Up   No follow-ups on file.  Patient was given instructions and counseling regarding her condition or for health maintenance advice. Please see specific information pulled into the AVS if appropriate.

## 2022-03-04 LAB
25(OH)D3+25(OH)D2 SERPL-MCNC: 26.5 NG/ML (ref 30–100)
ALBUMIN SERPL-MCNC: 5.3 G/DL (ref 3.8–4.9)
ALBUMIN/GLOB SERPL: 2.3 {RATIO} (ref 1.2–2.2)
ALP SERPL-CCNC: 103 IU/L (ref 44–121)
ALT SERPL-CCNC: 8 IU/L (ref 0–32)
AST SERPL-CCNC: 14 IU/L (ref 0–40)
BASOPHILS # BLD AUTO: 0.1 X10E3/UL (ref 0–0.2)
BASOPHILS NFR BLD AUTO: 1 %
BILIRUB SERPL-MCNC: 0.3 MG/DL (ref 0–1.2)
BUN SERPL-MCNC: 10 MG/DL (ref 6–24)
BUN/CREAT SERPL: 8 (ref 9–23)
CALCIUM SERPL-MCNC: 10.1 MG/DL (ref 8.7–10.2)
CHLORIDE SERPL-SCNC: 101 MMOL/L (ref 96–106)
CHOLEST SERPL-MCNC: 194 MG/DL (ref 100–199)
CO2 SERPL-SCNC: 25 MMOL/L (ref 20–29)
CREAT SERPL-MCNC: 1.2 MG/DL (ref 0.57–1)
EGFR GENE MUT ANL BLD/T: 52 ML/MIN/1.73
EOSINOPHIL # BLD AUTO: 0.2 X10E3/UL (ref 0–0.4)
EOSINOPHIL NFR BLD AUTO: 2 %
ERYTHROCYTE [DISTWIDTH] IN BLOOD BY AUTOMATED COUNT: 11.8 % (ref 11.7–15.4)
FOLATE SERPL-MCNC: >20 NG/ML
GLOBULIN SER CALC-MCNC: 2.3 G/DL (ref 1.5–4.5)
GLUCOSE SERPL-MCNC: 87 MG/DL (ref 65–99)
HCT VFR BLD AUTO: 45.8 % (ref 34–46.6)
HDLC SERPL-MCNC: 60 MG/DL
HGB BLD-MCNC: 15.4 G/DL (ref 11.1–15.9)
IMM GRANULOCYTES # BLD AUTO: 0 X10E3/UL (ref 0–0.1)
IMM GRANULOCYTES NFR BLD AUTO: 0 %
LDLC SERPL CALC-MCNC: 116 MG/DL (ref 0–99)
LYMPHOCYTES # BLD AUTO: 2.5 X10E3/UL (ref 0.7–3.1)
LYMPHOCYTES NFR BLD AUTO: 28 %
MCH RBC QN AUTO: 31.8 PG (ref 26.6–33)
MCHC RBC AUTO-ENTMCNC: 33.6 G/DL (ref 31.5–35.7)
MCV RBC AUTO: 94 FL (ref 79–97)
MONOCYTES # BLD AUTO: 0.6 X10E3/UL (ref 0.1–0.9)
MONOCYTES NFR BLD AUTO: 6 %
NEUTROPHILS # BLD AUTO: 5.5 X10E3/UL (ref 1.4–7)
NEUTROPHILS NFR BLD AUTO: 63 %
PLATELET # BLD AUTO: 295 X10E3/UL (ref 150–450)
POTASSIUM SERPL-SCNC: 3.9 MMOL/L (ref 3.5–5.2)
PROT SERPL-MCNC: 7.6 G/DL (ref 6–8.5)
RBC # BLD AUTO: 4.85 X10E6/UL (ref 3.77–5.28)
SODIUM SERPL-SCNC: 142 MMOL/L (ref 134–144)
TRIGL SERPL-MCNC: 98 MG/DL (ref 0–149)
VIT B12 SERPL-MCNC: 460 PG/ML (ref 232–1245)
VLDLC SERPL CALC-MCNC: 18 MG/DL (ref 5–40)
WBC # BLD AUTO: 8.8 X10E3/UL (ref 3.4–10.8)

## 2022-03-05 LAB
APPEARANCE UR: CLEAR
BACTERIA #/AREA URNS HPF: NORMAL /[HPF]
BACTERIA UR CULT: NO GROWTH
BACTERIA UR CULT: NORMAL
BILIRUB UR QL STRIP: NEGATIVE
CASTS URNS QL MICRO: NORMAL /LPF
COLOR UR: YELLOW
EPI CELLS #/AREA URNS HPF: NORMAL /HPF (ref 0–10)
GLUCOSE UR QL STRIP: NEGATIVE
HGB UR QL STRIP: NEGATIVE
KETONES UR QL STRIP: NEGATIVE
LEUKOCYTE ESTERASE UR QL STRIP: NEGATIVE
MICRO URNS: NORMAL
MICRO URNS: NORMAL
NITRITE UR QL STRIP: NEGATIVE
PH UR STRIP: 6 [PH] (ref 5–7.5)
PROT UR QL STRIP: NEGATIVE
RBC #/AREA URNS HPF: NORMAL /HPF (ref 0–2)
SP GR UR STRIP: 1.02 (ref 1–1.03)
UROBILINOGEN UR STRIP-MCNC: 0.2 MG/DL (ref 0.2–1)
WBC #/AREA URNS HPF: NORMAL /HPF (ref 0–5)

## 2022-03-08 ENCOUNTER — TELEPHONE (OUTPATIENT)
Dept: FAMILY MEDICINE CLINIC | Facility: CLINIC | Age: 57
End: 2022-03-08

## 2022-03-08 NOTE — TELEPHONE ENCOUNTER
PATIENT STATES THAT JONATHON IS WANTING HER TO TAKE VITAMIN D AND SHE IS ASKING IF JONATHON CAN SEND SCRIPT FOR HER BECAUSE SHE IS LOW ON FUNDS AND INSURANCE WILL COVER IT.      CONFIRMED PHARMACY  [facility] - [address] - [phone]  - [phone]

## 2022-03-15 DIAGNOSIS — F31.32 BIPOLAR AFFECTIVE DISORDER, CURRENTLY DEPRESSED, MODERATE: ICD-10-CM

## 2022-03-15 RX ORDER — LAMOTRIGINE 200 MG/1
400 TABLET ORAL DAILY
Qty: 180 TABLET | Refills: 1 | Status: SHIPPED | OUTPATIENT
Start: 2022-03-15

## 2022-03-15 NOTE — TELEPHONE ENCOUNTER
3/15/22    lamoTRIgine (LaMICtal) 200 MG tablet 400 mg, Oral, Daily   Summary: Take 2 tablets by mouth Daily., Starting Tue 3/1/2022, Normal  Dose, Frequency: 400 mg, Daily   Start: 3/1/2022   Ord/Sold: 3/1/2022 (O)   Report   Adh:   Long-term:   Pharmacy: [facility] - [place] [address] - [phone] [facility] [phone]   Med Dose History      Patient Sig: Take 2 tablets by mouth Daily.     Ordered on: 3/1/2022     Authorized by: JONATHON JOHNSON     Dispense: 30 tablet     Refills: 0 ordered     Note to Pharmacy: NO REFILLS NEEDS A APPT     Patient taking differently: 400 mg Oral Daily, Taking 300 mg once day, Reported on 3/3/2022

## 2022-03-17 ENCOUNTER — TELEPHONE (OUTPATIENT)
Dept: FAMILY MEDICINE CLINIC | Facility: CLINIC | Age: 57
End: 2022-03-17

## 2022-03-17 RX ORDER — MULTIVIT-MIN/IRON/FOLIC ACID/K 18-600-40
2000 CAPSULE ORAL DAILY
Qty: 90 CAPSULE | Refills: 1 | Status: SHIPPED | OUTPATIENT
Start: 2022-03-17 | End: 2022-03-24 | Stop reason: SDUPTHER

## 2022-03-17 NOTE — TELEPHONE ENCOUNTER
Last lab 3/3/22    Vitamin D is slightly low.  Recommend 2000 units daily over-the-counter.  Kidney function is slightly decreased from last blood work.  Recommend repeating again in 3 months.  All other labs are stable from last check

## 2022-03-17 NOTE — TELEPHONE ENCOUNTER
We would have to call pharmacy to see what they cover for passport. Most doses are OTC. You can call patient and ask her to check with her pharmacy of you dont have ermias

## 2022-03-17 NOTE — TELEPHONE ENCOUNTER
Caller: Va Pastor    Relationship: Self    Best call back number: [phone]    Requested Prescriptions:   Requested Prescriptions      No prescriptions requested or ordered in this encounter   VITAMIN D - DID NOT SEE IN CHART    Pharmacy where request should be sent: [facility] - [address] - [phone]  - [phone]      Additional details provided by patient: PATIENT STATES SHE WAS SUPPOSED TO GET THIS REFILLED AND SHE HAS NOT GOTTEN IT YET.  SHE SAID SHE IS IN NEED OF THIS TODAY.    Does the patient have less than a 3 day supply:  [x] Yes  [] No    Purnima GARCIA Rep   03/17/22 12:16 EDT

## 2022-03-24 RX ORDER — MULTIVIT-MIN/IRON/FOLIC ACID/K 18-600-40
2000 CAPSULE ORAL DAILY
Qty: 90 CAPSULE | Refills: 1 | Status: CANCELLED | OUTPATIENT
Start: 2022-03-24

## 2022-03-24 RX ORDER — MULTIVIT-MIN/IRON/FOLIC ACID/K 18-600-40
2000 CAPSULE ORAL DAILY
Qty: 90 CAPSULE | Refills: 1 | Status: SHIPPED | OUTPATIENT
Start: 2022-03-24

## 2022-03-24 NOTE — TELEPHONE ENCOUNTER
Caller: Va Pastor    Relationship: Self    Best call back number: [phone]    Requested Prescriptions:   Requested Prescriptions     Pending Prescriptions Disp Refills   • Vitamin D, Cholecalciferol, 50 MCG (2000 UT) capsule 90 capsule 1     Sig: Take 1 capsule by mouth Daily.        Pharmacy where request should be sent: [facility] - [address] - [phone]  - [phone]     Additional details provided by patient: PATIENT CALLED AND STATED THE MEDICATION WAS SENT TO THE WRONG PHARMACY. PATIENT IS COMPLETELY OUT OF THIS MEDICATION     Does the patient have less than a 3 day supply:  [x] Yes  [] No    Purnima WITT Rep   03/24/22 14:56 EDT

## 2022-03-28 NOTE — TELEPHONE ENCOUNTER
Last ov 3/3/22      She is due her mammo and dexascan. I will put that in today.  I will put in referral for Uro/gynocology.  Her urine was negative for blood or nitrates.  We will send for culture  She will follow up with pulmonary regarding her sleep apnea machine.  She is requesting referral with Dr Avitia for ortho for her chronic neck pain      Last lab 3/3/22    Vitamin D is slightly low.  Recommend 2000 units daily over-the-counter.  Kidney function is slightly decreased from last blood work.  Recommend repeating again in 3 months.  All other labs are stable from last check

## 2022-04-11 ENCOUNTER — TELEPHONE (OUTPATIENT)
Dept: FAMILY MEDICINE CLINIC | Facility: CLINIC | Age: 57
End: 2022-04-11

## 2022-04-11 RX ORDER — POTASSIUM CHLORIDE 750 MG/1
10 TABLET, FILM COATED, EXTENDED RELEASE ORAL 2 TIMES DAILY
Qty: 60 TABLET | Refills: 5 | Status: SHIPPED | OUTPATIENT
Start: 2022-04-11

## 2022-04-11 NOTE — TELEPHONE ENCOUNTER
pharmacy called on the behalf of the patient waiting on fill orders for the following    potassium chloride 10 MEQ CR tablet     The needed a call back just to clarify some things on the med fill .     Phone number is listed correct for contact back      [address], [place] [address] · ~21.7 mi  [phone]

## 2022-05-17 ENCOUNTER — TRANSCRIBE ORDERS (OUTPATIENT)
Dept: GENERAL RADIOLOGY | Facility: HOSPITAL | Age: 57
End: 2022-05-17

## 2022-05-17 ENCOUNTER — HOSPITAL ENCOUNTER (OUTPATIENT)
Dept: GENERAL RADIOLOGY | Facility: HOSPITAL | Age: 57
Discharge: HOME | End: 2022-05-17
Admitting: ORTHOPAEDIC SURGERY

## 2022-05-17 DIAGNOSIS — M54.2 NECK PAIN: ICD-10-CM

## 2022-05-17 DIAGNOSIS — M54.2 NECK PAIN: Primary | ICD-10-CM

## 2022-05-17 PROCEDURE — 72040 X-RAY EXAM NECK SPINE 2-3 VW: CPT

## 2022-05-27 ENCOUNTER — TRANSCRIBE ORDERS (OUTPATIENT)
Dept: ADMINISTRATIVE | Facility: HOSPITAL | Age: 57
End: 2022-05-27

## 2022-05-27 DIAGNOSIS — M50.31 OTHER CERVICAL DISC DEGENERATION, HIGH CERVICAL REGION: Primary | ICD-10-CM

## 2022-06-06 ENCOUNTER — HOSPITAL ENCOUNTER (OUTPATIENT)
Dept: CT IMAGING | Facility: HOSPITAL | Age: 57
Discharge: HOME | End: 2022-06-06
Admitting: ORTHOPAEDIC SURGERY

## 2022-06-06 DIAGNOSIS — M50.31 OTHER CERVICAL DISC DEGENERATION, HIGH CERVICAL REGION: ICD-10-CM

## 2022-06-06 PROCEDURE — 72125 CT NECK SPINE W/O DYE: CPT

## 2022-07-13 DIAGNOSIS — E03.9 HYPOTHYROIDISM, UNSPECIFIED TYPE: ICD-10-CM

## 2022-07-13 RX ORDER — LEVOTHYROXINE SODIUM 0.03 MG/1
25 TABLET ORAL DAILY
Qty: 30 TABLET | Refills: 5 | OUTPATIENT
Start: 2022-07-13

## 2022-09-09 RX ORDER — POTASSIUM CHLORIDE 750 MG/1
10 TABLET, FILM COATED, EXTENDED RELEASE ORAL 2 TIMES DAILY
Qty: 60 TABLET | Refills: 5 | OUTPATIENT
Start: 2022-09-09

## 2022-10-28 DIAGNOSIS — M19.90 GENERALIZED ARTHRITIS: ICD-10-CM

## 2022-10-28 RX ORDER — MELOXICAM 15 MG/1
15 TABLET ORAL DAILY
Qty: 90 TABLET | Refills: 3 | OUTPATIENT
Start: 2022-10-28